# Patient Record
Sex: FEMALE | Race: WHITE | NOT HISPANIC OR LATINO | Employment: FULL TIME | URBAN - METROPOLITAN AREA
[De-identification: names, ages, dates, MRNs, and addresses within clinical notes are randomized per-mention and may not be internally consistent; named-entity substitution may affect disease eponyms.]

---

## 2017-02-15 ENCOUNTER — ALLSCRIPTS OFFICE VISIT (OUTPATIENT)
Dept: OTHER | Facility: OTHER | Age: 62
End: 2017-02-15

## 2017-02-15 DIAGNOSIS — Z12.31 ENCOUNTER FOR SCREENING MAMMOGRAM FOR MALIGNANT NEOPLASM OF BREAST: ICD-10-CM

## 2017-02-15 DIAGNOSIS — Z12.11 ENCOUNTER FOR SCREENING FOR MALIGNANT NEOPLASM OF COLON: ICD-10-CM

## 2017-04-13 ENCOUNTER — APPOINTMENT (OUTPATIENT)
Dept: LAB | Facility: CLINIC | Age: 62
End: 2017-04-13
Payer: COMMERCIAL

## 2017-04-13 ENCOUNTER — ALLSCRIPTS OFFICE VISIT (OUTPATIENT)
Dept: OTHER | Facility: OTHER | Age: 62
End: 2017-04-13

## 2017-04-13 DIAGNOSIS — E55.9 VITAMIN D DEFICIENCY: ICD-10-CM

## 2017-04-13 DIAGNOSIS — E03.9 HYPOTHYROIDISM: ICD-10-CM

## 2017-04-13 DIAGNOSIS — I10 ESSENTIAL (PRIMARY) HYPERTENSION: ICD-10-CM

## 2017-04-13 DIAGNOSIS — R53.83 OTHER FATIGUE: ICD-10-CM

## 2017-04-13 DIAGNOSIS — Z12.11 ENCOUNTER FOR SCREENING FOR MALIGNANT NEOPLASM OF COLON: ICD-10-CM

## 2017-04-13 DIAGNOSIS — Z12.31 ENCOUNTER FOR SCREENING MAMMOGRAM FOR MALIGNANT NEOPLASM OF BREAST: ICD-10-CM

## 2017-04-13 LAB
25(OH)D3 SERPL-MCNC: 10.8 NG/ML (ref 30–100)
ALBUMIN SERPL BCP-MCNC: 3.4 G/DL (ref 3.5–5)
ALP SERPL-CCNC: 108 U/L (ref 46–116)
ALT SERPL W P-5'-P-CCNC: 23 U/L (ref 12–78)
ANION GAP SERPL CALCULATED.3IONS-SCNC: 4 MMOL/L (ref 4–13)
AST SERPL W P-5'-P-CCNC: 12 U/L (ref 5–45)
BILIRUB SERPL-MCNC: 0.32 MG/DL (ref 0.2–1)
BUN SERPL-MCNC: 15 MG/DL (ref 5–25)
CALCIUM SERPL-MCNC: 8.8 MG/DL (ref 8.3–10.1)
CHLORIDE SERPL-SCNC: 110 MMOL/L (ref 100–108)
CHOLEST SERPL-MCNC: 180 MG/DL (ref 50–200)
CO2 SERPL-SCNC: 29 MMOL/L (ref 21–32)
CREAT SERPL-MCNC: 0.85 MG/DL (ref 0.6–1.3)
ERYTHROCYTE [DISTWIDTH] IN BLOOD BY AUTOMATED COUNT: 13.7 % (ref 11.6–15.1)
GFR SERPL CREATININE-BSD FRML MDRD: >60 ML/MIN/1.73SQ M
GLUCOSE P FAST SERPL-MCNC: 98 MG/DL (ref 65–99)
HCT VFR BLD AUTO: 40.9 % (ref 34.8–46.1)
HDLC SERPL-MCNC: 53 MG/DL (ref 40–60)
HGB BLD-MCNC: 13.4 G/DL (ref 11.5–15.4)
LDLC SERPL CALC-MCNC: 109 MG/DL (ref 0–100)
MCH RBC QN AUTO: 30.2 PG (ref 26.8–34.3)
MCHC RBC AUTO-ENTMCNC: 32.8 G/DL (ref 31.4–37.4)
MCV RBC AUTO: 92 FL (ref 82–98)
PLATELET # BLD AUTO: 183 THOUSANDS/UL (ref 149–390)
PMV BLD AUTO: 11.7 FL (ref 8.9–12.7)
POTASSIUM SERPL-SCNC: 3.5 MMOL/L (ref 3.5–5.3)
PROT SERPL-MCNC: 6.9 G/DL (ref 6.4–8.2)
RBC # BLD AUTO: 4.43 MILLION/UL (ref 3.81–5.12)
SODIUM SERPL-SCNC: 143 MMOL/L (ref 136–145)
TRIGL SERPL-MCNC: 91 MG/DL
TSH SERPL DL<=0.05 MIU/L-ACNC: 0.81 UIU/ML (ref 0.36–3.74)
WBC # BLD AUTO: 6.1 THOUSAND/UL (ref 4.31–10.16)

## 2017-04-13 PROCEDURE — 80061 LIPID PANEL: CPT

## 2017-04-13 PROCEDURE — 82306 VITAMIN D 25 HYDROXY: CPT

## 2017-04-13 PROCEDURE — 36415 COLL VENOUS BLD VENIPUNCTURE: CPT

## 2017-04-13 PROCEDURE — 84443 ASSAY THYROID STIM HORMONE: CPT

## 2017-04-13 PROCEDURE — 80053 COMPREHEN METABOLIC PANEL: CPT

## 2017-04-13 PROCEDURE — 85027 COMPLETE CBC AUTOMATED: CPT

## 2017-06-08 DIAGNOSIS — E55.9 VITAMIN D DEFICIENCY: ICD-10-CM

## 2017-07-03 ENCOUNTER — ALLSCRIPTS OFFICE VISIT (OUTPATIENT)
Dept: OTHER | Facility: OTHER | Age: 62
End: 2017-07-03

## 2017-07-06 ENCOUNTER — TRANSCRIBE ORDERS (OUTPATIENT)
Dept: LAB | Facility: CLINIC | Age: 62
End: 2017-07-06

## 2017-07-06 ENCOUNTER — APPOINTMENT (OUTPATIENT)
Dept: LAB | Facility: CLINIC | Age: 62
End: 2017-07-06
Payer: COMMERCIAL

## 2017-07-06 DIAGNOSIS — E55.9 VITAMIN D DEFICIENCY: ICD-10-CM

## 2017-07-06 LAB — 25(OH)D3 SERPL-MCNC: 24.2 NG/ML (ref 30–100)

## 2017-07-06 PROCEDURE — 36415 COLL VENOUS BLD VENIPUNCTURE: CPT

## 2017-07-06 PROCEDURE — 82306 VITAMIN D 25 HYDROXY: CPT

## 2017-07-07 ENCOUNTER — GENERIC CONVERSION - ENCOUNTER (OUTPATIENT)
Dept: OTHER | Facility: OTHER | Age: 62
End: 2017-07-07

## 2017-07-27 ENCOUNTER — GENERIC CONVERSION - ENCOUNTER (OUTPATIENT)
Dept: OTHER | Facility: OTHER | Age: 62
End: 2017-07-27

## 2017-07-27 ENCOUNTER — APPOINTMENT (OUTPATIENT)
Dept: LAB | Facility: CLINIC | Age: 62
End: 2017-07-27
Payer: COMMERCIAL

## 2017-07-27 DIAGNOSIS — Z12.11 ENCOUNTER FOR SCREENING FOR MALIGNANT NEOPLASM OF COLON: ICD-10-CM

## 2017-07-27 LAB — HEMOCCULT STL QL IA: NEGATIVE

## 2017-07-27 PROCEDURE — G0328 FECAL BLOOD SCRN IMMUNOASSAY: HCPCS

## 2017-08-21 DIAGNOSIS — E87.6 HYPOKALEMIA: ICD-10-CM

## 2017-08-21 DIAGNOSIS — Z12.31 ENCOUNTER FOR SCREENING MAMMOGRAM FOR MALIGNANT NEOPLASM OF BREAST: ICD-10-CM

## 2017-08-21 DIAGNOSIS — E55.9 VITAMIN D DEFICIENCY: ICD-10-CM

## 2017-08-24 ENCOUNTER — APPOINTMENT (OUTPATIENT)
Dept: LAB | Facility: CLINIC | Age: 62
End: 2017-08-24
Payer: COMMERCIAL

## 2017-08-24 ENCOUNTER — TRANSCRIBE ORDERS (OUTPATIENT)
Dept: LAB | Facility: CLINIC | Age: 62
End: 2017-08-24

## 2017-08-24 DIAGNOSIS — E55.9 VITAMIN D DEFICIENCY: ICD-10-CM

## 2017-08-24 DIAGNOSIS — E87.6 HYPOKALEMIA: ICD-10-CM

## 2017-08-24 LAB
25(OH)D3 SERPL-MCNC: 40 NG/ML (ref 30–100)
POTASSIUM SERPL-SCNC: 4 MMOL/L (ref 3.5–5.3)

## 2017-08-24 PROCEDURE — 82306 VITAMIN D 25 HYDROXY: CPT

## 2017-08-24 PROCEDURE — 84132 ASSAY OF SERUM POTASSIUM: CPT

## 2017-08-24 PROCEDURE — 36415 COLL VENOUS BLD VENIPUNCTURE: CPT

## 2017-08-25 ENCOUNTER — GENERIC CONVERSION - ENCOUNTER (OUTPATIENT)
Dept: OTHER | Facility: OTHER | Age: 62
End: 2017-08-25

## 2017-08-28 ENCOUNTER — ALLSCRIPTS OFFICE VISIT (OUTPATIENT)
Dept: OTHER | Facility: OTHER | Age: 62
End: 2017-08-28

## 2017-10-23 ENCOUNTER — ALLSCRIPTS OFFICE VISIT (OUTPATIENT)
Dept: OTHER | Facility: OTHER | Age: 62
End: 2017-10-23

## 2017-10-23 DIAGNOSIS — R10.11 RIGHT UPPER QUADRANT PAIN: ICD-10-CM

## 2017-10-24 ENCOUNTER — HOSPITAL ENCOUNTER (OUTPATIENT)
Dept: RADIOLOGY | Age: 62
Discharge: HOME | End: 2017-10-24
Payer: COMMERCIAL

## 2017-10-24 DIAGNOSIS — R10.11 RIGHT UPPER QUADRANT PAIN: ICD-10-CM

## 2017-10-24 PROCEDURE — 76700 US EXAM ABDOM COMPLETE: CPT

## 2017-10-24 NOTE — PROGRESS NOTES
Assessment  1  Chest pain (786 50) (R07 9)   2  Right upper quadrant abdominal pain of unknown etiology (789 01) (R10 11)    Plan  Allergic rhinitis    · Fluticasone Propionate 50 MCG/ACT Nasal Suspension; use 2 sprays in each  nostril once daily  Chest pain    · EKG/ECG- POC; Status:Complete;   Done: 46QLI8793 03:23PM  Right upper quadrant abdominal pain of unknown etiology    · * US ABDOMEN COMPLETE; Status:Hold For - Scheduling; Requested BUN:[id number];     Discussion/Summary    Will await results of the ultrasound and treat accordingly pending results  Possible side effects of new medications were reviewed with the patient/guardian today  The treatment plan was reviewed with the patient/guardian  The patient/guardian understands and agrees with the treatment plan      Chief Complaint  1  Abdominal Pain    History of Present Illness  HPI: pAIN ALONG DIAPHRAGM AREA THAT WRAPS AROUND BILATERALLY AROUND TO BACK  NO COUGH, NO FEVER, + HEADACHE  She complains of increased belching but no bloating no increased heartburn symptoms no change in bowels or bladder and no injury to her back  She denies any chest pain jaw pain or arm pain or dyspnea with exertion  Review of Systems    Constitutional: No fever, no chills, feels well, no tiredness, no recent weight gain or loss  ENT: no ear ache, no loss of hearing, no nosebleeds or nasal discharge, no sore throat or hoarseness  Cardiovascular: no complaints of slow or fast heart rate, no chest pain, no palpitations, no leg claudication or lower extremity edema  Respiratory: no complaints of shortness of breath, no wheezing, no dyspnea on exertion, no orthopnea or PND  Breasts: no complaints of breast pain, breast lump or nipple discharge     Gastrointestinal: abdominal pain-- and-- Pain in the abdominal area high up radiating around both sides of the ribs anteriorly, but-- no complaints of abdominal pain, no constipation, no nausea or diarrhea, no vomiting, no bloody stools  Genitourinary: no complaints of dysuria, no incontinence, no pelvic pain, no dysmenorrhea, no vaginal discharge or abnormal vaginal bleeding  Musculoskeletal: no complaints of arthralgia, no myalgia, no joint swelling or stiffness, no limb pain or swelling  Integumentary: no complaints of skin rash or lesion, no itching or dry skin, no skin wounds  Neurological: no complaints of headache, no confusion, no numbness or tingling, no dizziness or fainting  Active Problems  1  Acute bronchitis (466 0) (J20 9)   2  Acute otitis media (382 9) (H66 90)   3  Acute sinusitis (461 9) (J01 90)   4  Acute upper respiratory infection (465 9) (J06 9)   5  Anxiety (300 00) (F41 9)   6  Bite, insect (919 4,E906 4) (W57 XXXA)   7  Chronic allergic conjunctivitis (372 14) (H10 45)   8  Colon cancer screening (V76 51) (Z12 11)   9  Cough (786 2) (R05)   10  Encounter for screening mammogram for breast cancer (V76 12) (Z12 31)   11  Encounter for vaccination (V05 9) (Z23)   12  Fatigue (780 79) (R53 83)   13  Hypertension (401 9) (I10)   14  Denied: History of Hyperthyroidism   15  Hypokalemia (276 8) (E87 6)   16  Hypothyroidism (244 9) (E03 9)   17  Insomnia (780 52) (G47 00)   18  Obesity (278 00) (E66 9)   19  Swelling (782 3) (R60 9)   20  Vitamin D deficiency (268 9) (E55 9)    Past Medical History  1  Denied: History of Hyperthyroidism  Active Problems And Past Medical History Reviewed: The active problems and past medical history were reviewed and updated today  Family History  Father    1  Family history of Diabetes Mellitus (V18 0)   2  Family history of Heart Disease (V17 49)   3  Family history of Hypertension (V17 49)  Family History Reviewed: The family history was reviewed and updated today         Social History   · Denied: History of Alcohol Use (History)   · Denied: History of Daily Coffee Consumption (___ Cups/Day)   · Daily Cola Consumption (___ Cans/Day)   · Marital History - Currently    · Never A Smoker  The social history was reviewed and updated today  The social history was reviewed and is unchanged  Surgical History  Surgical History Reviewed: The surgical history was reviewed and updated today  Current Meds   1  AmLODIPine Besylate 5 MG Oral Tablet; TAKE 1 5 TABLET Daily; Therapy: 95PVQ0327 to (Deborah Holly)  Requested for: 41HOX5638; Last   Rx:29Eyd4676 Ordered   2  Fluticasone Propionate 50 MCG/ACT Nasal Suspension; use 2 sprays in each nostril   once daily; Therapy: 01Ywx5315 to (Andre Valentino)  Requested for: 82Uql2625; Last   Rx:08Hdz7225 Ordered   3  Levothyroxine Sodium 100 MCG Oral Tablet; TAKE 1 TABLET DAILY; Therapy: 34SNY2267 to ([phone])  Requested for: 07Ccz8765; Last   Rx:54Vzx1381 Ordered   4  Losartan Potassium 100 MG Oral Tablet; take 1 tablet every day; Therapy: 59OEI1258 to (Evaluate:74Ajz5391)  Requested for: 73NVE3053; Last   Rx:48Oal7681 Ordered   5  Potassium Chloride ER 10 MEQ Oral Capsule Extended Release; TAKE 2 CAPSULES   EVERY DAY; Therapy: 02PLS9176 to (Evaluate:95Xvh6983)  Requested for: 55Wtt7919; Last   Rx:07Jse1676 Ordered   6  Spironolactone 25 MG Oral Tablet; TAKE 1 TABLET AS NEEDED FOR SWELLING; Therapy: 02IHD1659 to ([phone])  Requested for: 86Mjl6248; Last   Rx:06Tma5628 Ordered   7  Synthroid 100 MCG Oral Tablet; TAKE 1 TABLET BY MOUTH EVERY DAY AS DIRECTED    Requested for: 62Rxo8782; Last Rx:98Vhy0026 Ordered   8  Vitamin C 500 MG Oral Capsule; TAKE 2 CAPSULE Twice daily Recorded   9  Vitamin D3 26478 UNIT Oral Capsule; 50,000 UNITS Q WEEK X 4 WEEKS; Therapy: 70RST1781 to (Last Rx:40Pvg8777)  Requested for: 36CQK1842 Ordered   10  ZyrTEC Allergy 10 MG Oral Tablet; take 1 tablet daily as needed Recorded    The medication list was reviewed and updated today  Allergies  1  Lexapro TABS   2   Morphine Sulfate ER TB12    Vitals   Recorded: 96RRQ8343 03:02PM   Temperature 98 F Heart Rate 68   Systolic 104   Diastolic 80   Height 5 ft 6 in   Weight 163 lb    BMI Calculated 26 31   BSA Calculated 1 83     Physical Exam    Constitutional   General appearance: No acute distress, well appearing and well nourished  Eyes   Conjunctiva and lids: No swelling, erythema or discharge  Pupils and irises: Equal, round and reactive to light  Ears, Nose, Mouth, and Throat   External inspection of ears and nose: Normal     Otoscopic examination: Tympanic membranes translucent with normal light reflex  Canals patent without erythema  Nasal mucosa, septum, and turbinates: Normal without edema or erythema  Oropharynx: Normal with no erythema, edema, exudate or lesions  Pulmonary   Respiratory effort: No increased work of breathing or signs of respiratory distress  Auscultation of lungs: Clear to auscultation  Cardiovascular   Auscultation of heart: Normal rate and rhythm, normal S1 and S2, without murmurs  Examination of extremities for edema and/or varicosities: Normal     Carotid pulses: Normal     Abdomen   Abdomen: Abnormal  -- no tenderness on palpation, no rigidity rebound are doing  Liver and spleen: No hepatomegaly or splenomegaly  Lymphatic   Palpation of lymph nodes in neck: No lymphadenopathy  Musculoskeletal   Digits and nails: Normal without clubbing or cyanosis  Skin   Skin and subcutaneous tissue: Normal without rashes or lesions  Neurologic   Reflexes: 2+ and symmetric      Psychiatric   Orientation to person, place, and time: Normal     Mood and affect: Normal          Results/Data  EKG/ECG- POC 55GQN4790 03:23PM Robert Dang     Test Name Result Flag Reference   EKG/ECG      Normal sinus rhythm without acute changes       Future Appointments    Date/Time Provider Specialty Site   03/30/2018 08:00 AM Robert Dang DO Family Medicine [facility] [place] [facility]     Signatures   Electronically signed by : Mil Griggs DO; Oct 23 2017  3:40PM EST                       (Author)

## 2017-10-27 ENCOUNTER — GENERIC CONVERSION - ENCOUNTER (OUTPATIENT)
Dept: OTHER | Facility: OTHER | Age: 62
End: 2017-10-27

## 2017-10-31 ENCOUNTER — GENERIC CONVERSION - ENCOUNTER (OUTPATIENT)
Dept: OTHER | Facility: OTHER | Age: 62
End: 2017-10-31

## 2017-11-04 ENCOUNTER — TRANSCRIBE ORDERS (OUTPATIENT)
Dept: LAB | Facility: CLINIC | Age: 62
End: 2017-11-04

## 2017-11-04 ENCOUNTER — APPOINTMENT (OUTPATIENT)
Dept: LAB | Facility: CLINIC | Age: 62
End: 2017-11-04
Payer: COMMERCIAL

## 2017-11-04 DIAGNOSIS — K80.18 CALCULUS OF GALLBLADDER WITH OTHER CHOLECYSTITIS WITHOUT OBSTRUCTION: ICD-10-CM

## 2017-11-04 DIAGNOSIS — K80.18 CALCULUS OF GALLBLADDER WITH OTHER CHOLECYSTITIS WITHOUT OBSTRUCTION: Primary | ICD-10-CM

## 2017-11-04 LAB
ALBUMIN SERPL BCP-MCNC: 3.3 G/DL (ref 3.5–5)
ALP SERPL-CCNC: 111 U/L (ref 46–116)
ALT SERPL W P-5'-P-CCNC: 22 U/L (ref 12–78)
ANION GAP SERPL CALCULATED.3IONS-SCNC: 4 MMOL/L (ref 4–13)
AST SERPL W P-5'-P-CCNC: 18 U/L (ref 5–45)
BILIRUB SERPL-MCNC: 0.3 MG/DL (ref 0.2–1)
BUN SERPL-MCNC: 13 MG/DL (ref 5–25)
CALCIUM SERPL-MCNC: 8.7 MG/DL (ref 8.3–10.1)
CHLORIDE SERPL-SCNC: 107 MMOL/L (ref 100–108)
CO2 SERPL-SCNC: 31 MMOL/L (ref 21–32)
CREAT SERPL-MCNC: 0.87 MG/DL (ref 0.6–1.3)
ERYTHROCYTE [DISTWIDTH] IN BLOOD BY AUTOMATED COUNT: 13.8 % (ref 11.6–15.1)
GFR SERPL CREATININE-BSD FRML MDRD: 72 ML/MIN/1.73SQ M
GLUCOSE SERPL-MCNC: 118 MG/DL (ref 65–140)
HCT VFR BLD AUTO: 42.1 % (ref 34.8–46.1)
HGB BLD-MCNC: 13.6 G/DL (ref 11.5–15.4)
MCH RBC QN AUTO: 30.1 PG (ref 26.8–34.3)
MCHC RBC AUTO-ENTMCNC: 32.3 G/DL (ref 31.4–37.4)
MCV RBC AUTO: 93 FL (ref 82–98)
PLATELET # BLD AUTO: 155 THOUSANDS/UL (ref 149–390)
PMV BLD AUTO: 11.7 FL (ref 8.9–12.7)
POTASSIUM SERPL-SCNC: 3.7 MMOL/L (ref 3.5–5.3)
PROT SERPL-MCNC: 7 G/DL (ref 6.4–8.2)
RBC # BLD AUTO: 4.52 MILLION/UL (ref 3.81–5.12)
SODIUM SERPL-SCNC: 142 MMOL/L (ref 136–145)
WBC # BLD AUTO: 4.94 THOUSAND/UL (ref 4.31–10.16)

## 2017-11-04 PROCEDURE — 85027 COMPLETE CBC AUTOMATED: CPT

## 2017-11-04 PROCEDURE — 36415 COLL VENOUS BLD VENIPUNCTURE: CPT

## 2017-11-04 PROCEDURE — 80053 COMPREHEN METABOLIC PANEL: CPT

## 2017-11-10 RX ORDER — FLUTICASONE PROPIONATE 50 MCG
SPRAY, SUSPENSION (ML) NASAL
COMMUNITY
Start: 2016-09-30 | End: 2018-11-20 | Stop reason: SDUPTHER

## 2017-11-10 RX ORDER — MULTIVIT-MIN/IRON/FOLIC ACID/K 18-600-40
CAPSULE ORAL
COMMUNITY

## 2017-11-10 RX ORDER — AMLODIPINE BESYLATE 5 MG/1
5 TABLET ORAL DAILY
COMMUNITY
Start: 2017-04-05 | End: 2018-03-15 | Stop reason: SDUPTHER

## 2017-11-10 RX ORDER — LOSARTAN POTASSIUM 100 MG/1
TABLET ORAL
COMMUNITY
End: 2018-02-20 | Stop reason: SDUPTHER

## 2017-11-10 RX ORDER — ERGOCALCIFEROL 1.25 MG/1
CAPSULE ORAL
COMMUNITY
Start: 2017-07-10 | End: 2017-11-10 | Stop reason: ALTCHOICE

## 2017-11-10 RX ORDER — SPIRONOLACTONE 25 MG/1
TABLET ORAL AS NEEDED
COMMUNITY
Start: 2017-09-11 | End: 2019-01-21 | Stop reason: ALTCHOICE

## 2017-11-10 RX ORDER — POTASSIUM CHLORIDE 750 MG/1
20 CAPSULE, EXTENDED RELEASE ORAL DAILY
COMMUNITY
Start: 2016-08-24 | End: 2018-03-15 | Stop reason: SDUPTHER

## 2017-11-10 RX ORDER — LEVOTHYROXINE SODIUM 0.1 MG/1
TABLET ORAL
COMMUNITY
Start: 2016-09-02 | End: 2018-02-07 | Stop reason: SDUPTHER

## 2017-11-10 NOTE — PRE-PROCEDURE INSTRUCTIONS
Pre-Surgery Instructions:   Medication Instructions    amLODIPine (NORVASC) 5 mg tablet Instructed patient per Anesthesia Guidelines   Ascorbic Acid (VITAMIN C) 500 MG CAPS Instructed patient per Anesthesia Guidelines   Cetirizine HCl (ZYRTEC ALLERGY) 10 MG CAPS Instructed patient per Anesthesia Guidelines   Cholecalciferol (VITAMIN D PO) Instructed patient per Anesthesia Guidelines   fluticasone (FLONASE) 50 mcg/act nasal spray Instructed patient per Anesthesia Guidelines   levothyroxine 100 mcg tablet Instructed patient per Anesthesia Guidelines   losartan (COZAAR) 100 MG tablet Instructed patient per Anesthesia Guidelines   potassium chloride (MICRO-K) 10 MEQ CR capsule Instructed patient per Anesthesia Guidelines      spironolactone (ALDACTONE) 25 mg tablet Patient was instructed per "E-Preop"

## 2017-11-14 ENCOUNTER — ANESTHESIA EVENT (OUTPATIENT)
Dept: PERIOP | Facility: HOSPITAL | Age: 62
End: 2017-11-14
Payer: COMMERCIAL

## 2017-11-14 NOTE — ANESTHESIA PREPROCEDURE EVALUATION
Review of Systems/Medical History  Patient summary reviewed  Chart reviewed  No history of anesthetic complications     Cardiovascular  Hypertension ,    Pulmonary       GI/Hepatic            Endo/Other  History of thyroid disease , hypothyroidism,      GYN       Hematology   Musculoskeletal       Neurology   Psychology           Physical Exam    Airway    Mallampati score: II  TM Distance: >3 FB  Neck ROM: full     Dental   No notable dental hx     Cardiovascular  Cardiovascular exam normal    Pulmonary  Pulmonary exam normal     Other Findings        Anesthesia Plan  ASA Score- 3       Anesthesia Type- general with ASA Monitors  Additional Monitors:   Airway Plan: ETT  Induction- intravenous  Informed Consent- Anesthetic plan and risks discussed with patient  I personally reviewed this patient with the CRNA  Discussed and agreed on the Anesthesia Plan with the CRNA  Dani Saez

## 2017-11-15 ENCOUNTER — ANESTHESIA (OUTPATIENT)
Dept: PERIOP | Facility: HOSPITAL | Age: 62
End: 2017-11-15
Payer: COMMERCIAL

## 2017-11-15 ENCOUNTER — HOSPITAL ENCOUNTER (OUTPATIENT)
Facility: HOSPITAL | Age: 62
Setting detail: OUTPATIENT SURGERY
Discharge: HOME | End: 2017-11-15
Attending: SURGERY | Admitting: SURGERY
Payer: COMMERCIAL

## 2017-11-15 VITALS
BODY MASS INDEX: 30.21 KG/M2 | RESPIRATION RATE: 16 BRPM | HEIGHT: 61 IN | WEIGHT: 160 LBS | DIASTOLIC BLOOD PRESSURE: 59 MMHG | TEMPERATURE: 98.5 F | SYSTOLIC BLOOD PRESSURE: 111 MMHG | OXYGEN SATURATION: 93 % | HEART RATE: 67 BPM

## 2017-11-15 DIAGNOSIS — K80.20 CALCULUS OF GALLBLADDER WITHOUT CHOLECYSTITIS WITHOUT OBSTRUCTION: ICD-10-CM

## 2017-11-15 PROCEDURE — 88304 TISSUE EXAM BY PATHOLOGIST: CPT | Performed by: SURGERY

## 2017-11-15 RX ORDER — KETOROLAC TROMETHAMINE 30 MG/ML
INJECTION, SOLUTION INTRAMUSCULAR; INTRAVENOUS AS NEEDED
Status: DISCONTINUED | OUTPATIENT
Start: 2017-11-15 | End: 2017-11-15 | Stop reason: SURG

## 2017-11-15 RX ORDER — OXYCODONE HYDROCHLORIDE 5 MG/1
5 TABLET ORAL EVERY 4 HOURS PRN
Status: DISCONTINUED | OUTPATIENT
Start: 2017-11-15 | End: 2017-11-15 | Stop reason: HOSPADM

## 2017-11-15 RX ORDER — ONDANSETRON 2 MG/ML
INJECTION INTRAMUSCULAR; INTRAVENOUS AS NEEDED
Status: DISCONTINUED | OUTPATIENT
Start: 2017-11-15 | End: 2017-11-15 | Stop reason: SURG

## 2017-11-15 RX ORDER — SODIUM CHLORIDE, SODIUM LACTATE, POTASSIUM CHLORIDE, CALCIUM CHLORIDE 600; 310; 30; 20 MG/100ML; MG/100ML; MG/100ML; MG/100ML
20 INJECTION, SOLUTION INTRAVENOUS CONTINUOUS
Status: DISCONTINUED | OUTPATIENT
Start: 2017-11-15 | End: 2017-11-15 | Stop reason: HOSPADM

## 2017-11-15 RX ORDER — EPHEDRINE SULFATE 50 MG/ML
INJECTION, SOLUTION INTRAVENOUS AS NEEDED
Status: DISCONTINUED | OUTPATIENT
Start: 2017-11-15 | End: 2017-11-15 | Stop reason: SURG

## 2017-11-15 RX ORDER — LIDOCAINE HYDROCHLORIDE 10 MG/ML
INJECTION, SOLUTION INFILTRATION; PERINEURAL AS NEEDED
Status: DISCONTINUED | OUTPATIENT
Start: 2017-11-15 | End: 2017-11-15 | Stop reason: SURG

## 2017-11-15 RX ORDER — METOCLOPRAMIDE HYDROCHLORIDE 5 MG/ML
INJECTION INTRAMUSCULAR; INTRAVENOUS AS NEEDED
Status: DISCONTINUED | OUTPATIENT
Start: 2017-11-15 | End: 2017-11-15 | Stop reason: SURG

## 2017-11-15 RX ORDER — OXYCODONE HYDROCHLORIDE 10 MG/1
10 TABLET ORAL EVERY 4 HOURS PRN
Status: DISCONTINUED | OUTPATIENT
Start: 2017-11-15 | End: 2017-11-15 | Stop reason: HOSPADM

## 2017-11-15 RX ORDER — BUPIVACAINE HYDROCHLORIDE AND EPINEPHRINE 2.5; 5 MG/ML; UG/ML
INJECTION, SOLUTION EPIDURAL; INFILTRATION; INTRACAUDAL; PERINEURAL AS NEEDED
Status: DISCONTINUED | OUTPATIENT
Start: 2017-11-15 | End: 2017-11-15 | Stop reason: HOSPADM

## 2017-11-15 RX ORDER — FENTANYL CITRATE 50 UG/ML
INJECTION, SOLUTION INTRAMUSCULAR; INTRAVENOUS AS NEEDED
Status: DISCONTINUED | OUTPATIENT
Start: 2017-11-15 | End: 2017-11-15 | Stop reason: SURG

## 2017-11-15 RX ORDER — MAGNESIUM HYDROXIDE 1200 MG/15ML
LIQUID ORAL AS NEEDED
Status: DISCONTINUED | OUTPATIENT
Start: 2017-11-15 | End: 2017-11-15 | Stop reason: HOSPADM

## 2017-11-15 RX ORDER — SODIUM CHLORIDE, SODIUM LACTATE, POTASSIUM CHLORIDE, CALCIUM CHLORIDE 600; 310; 30; 20 MG/100ML; MG/100ML; MG/100ML; MG/100ML
75 INJECTION, SOLUTION INTRAVENOUS CONTINUOUS
Status: DISCONTINUED | OUTPATIENT
Start: 2017-11-15 | End: 2017-11-15 | Stop reason: HOSPADM

## 2017-11-15 RX ORDER — PROPOFOL 10 MG/ML
INJECTION, EMULSION INTRAVENOUS AS NEEDED
Status: DISCONTINUED | OUTPATIENT
Start: 2017-11-15 | End: 2017-11-15 | Stop reason: SURG

## 2017-11-15 RX ORDER — ONDANSETRON 2 MG/ML
4 INJECTION INTRAMUSCULAR; INTRAVENOUS ONCE AS NEEDED
Status: DISCONTINUED | OUTPATIENT
Start: 2017-11-15 | End: 2017-11-15 | Stop reason: HOSPADM

## 2017-11-15 RX ORDER — ACETAMINOPHEN 325 MG/1
650 TABLET ORAL EVERY 6 HOURS PRN
Status: DISCONTINUED | OUTPATIENT
Start: 2017-11-15 | End: 2017-11-15 | Stop reason: HOSPADM

## 2017-11-15 RX ORDER — ROCURONIUM BROMIDE 10 MG/ML
INJECTION, SOLUTION INTRAVENOUS AS NEEDED
Status: DISCONTINUED | OUTPATIENT
Start: 2017-11-15 | End: 2017-11-15 | Stop reason: SURG

## 2017-11-15 RX ORDER — GLYCOPYRROLATE 0.2 MG/ML
INJECTION INTRAMUSCULAR; INTRAVENOUS AS NEEDED
Status: DISCONTINUED | OUTPATIENT
Start: 2017-11-15 | End: 2017-11-15 | Stop reason: SURG

## 2017-11-15 RX ORDER — PROPOFOL 10 MG/ML
INJECTION, EMULSION INTRAVENOUS CONTINUOUS PRN
Status: DISCONTINUED | OUTPATIENT
Start: 2017-11-15 | End: 2017-11-15 | Stop reason: SURG

## 2017-11-15 RX ORDER — ONDANSETRON 2 MG/ML
4 INJECTION INTRAMUSCULAR; INTRAVENOUS EVERY 6 HOURS PRN
Status: DISCONTINUED | OUTPATIENT
Start: 2017-11-15 | End: 2017-11-15 | Stop reason: HOSPADM

## 2017-11-15 RX ORDER — FENTANYL CITRATE/PF 50 MCG/ML
25 SYRINGE (ML) INJECTION
Status: DISCONTINUED | OUTPATIENT
Start: 2017-11-15 | End: 2017-11-15 | Stop reason: HOSPADM

## 2017-11-15 RX ADMIN — GLYCOPYRROLATE 0.4 MG: 0.2 INJECTION, SOLUTION INTRAMUSCULAR; INTRAVENOUS at 11:15

## 2017-11-15 RX ADMIN — LIDOCAINE HYDROCHLORIDE 50 MG: 10 INJECTION, SOLUTION INFILTRATION; PERINEURAL at 10:21

## 2017-11-15 RX ADMIN — SODIUM CHLORIDE, SODIUM LACTATE, POTASSIUM CHLORIDE, AND CALCIUM CHLORIDE: .6; .31; .03; .02 INJECTION, SOLUTION INTRAVENOUS at 11:10

## 2017-11-15 RX ADMIN — CEFAZOLIN SODIUM 1000 MG: 1 SOLUTION INTRAVENOUS at 10:26

## 2017-11-15 RX ADMIN — FENTANYL CITRATE 25 MCG: 50 INJECTION, SOLUTION INTRAMUSCULAR; INTRAVENOUS at 11:48

## 2017-11-15 RX ADMIN — KETOROLAC TROMETHAMINE 30 MG: 30 INJECTION, SOLUTION INTRAMUSCULAR at 11:10

## 2017-11-15 RX ADMIN — METOCLOPRAMIDE 10 MG: 5 INJECTION, SOLUTION INTRAMUSCULAR; INTRAVENOUS at 10:26

## 2017-11-15 RX ADMIN — SODIUM CHLORIDE, SODIUM LACTATE, POTASSIUM CHLORIDE, AND CALCIUM CHLORIDE 20 ML/HR: .6; .31; .03; .02 INJECTION, SOLUTION INTRAVENOUS at 09:51

## 2017-11-15 RX ADMIN — PROPOFOL 80 MCG/KG/MIN: 10 INJECTION, EMULSION INTRAVENOUS at 10:31

## 2017-11-15 RX ADMIN — ONDANSETRON 4 MG: 2 INJECTION INTRAMUSCULAR; INTRAVENOUS at 10:40

## 2017-11-15 RX ADMIN — EPHEDRINE SULFATE 5 MG: 50 INJECTION, SOLUTION INTRAMUSCULAR; INTRAVENOUS; SUBCUTANEOUS at 11:02

## 2017-11-15 RX ADMIN — NEOSTIGMINE METHYLSULFATE 3 MG: 1 INJECTION, SOLUTION INTRAMUSCULAR; INTRAVENOUS; SUBCUTANEOUS at 11:15

## 2017-11-15 RX ADMIN — DEXAMETHASONE SODIUM PHOSPHATE 10 MG: 10 INJECTION INTRAMUSCULAR; INTRAVENOUS at 10:40

## 2017-11-15 RX ADMIN — ROCURONIUM BROMIDE 40 MG: 10 INJECTION INTRAVENOUS at 10:21

## 2017-11-15 RX ADMIN — FENTANYL CITRATE 100 MCG: 50 INJECTION, SOLUTION INTRAMUSCULAR; INTRAVENOUS at 10:19

## 2017-11-15 RX ADMIN — PROPOFOL 200 MG: 10 INJECTION, EMULSION INTRAVENOUS at 10:21

## 2017-11-15 NOTE — OP NOTE
OPERATIVE REPORT  PATIENT NAME: Melida Cancino    :    MRN: [id number]  Pt Location: [facility] OR ROOM 07    SURGERY DATE: 11/15/2017    Surgeon(s) and Role:     Rosenda White MD - Primary     * Rashid Gordon - Assisting    Preop Diagnosis:  Calculus of gallbladder without cholecystitis without obstruction [K80 20]    Post-Op Diagnosis Codes:     * Calculus of gallbladder without cholecystitis without obstruction [K80 20] incisional hernia    Procedure-laparoscopic cholecystectomy  Repair of incisional hernia  Specimen(s):  ID Type Source Tests Collected by Time Destination   1 : gallbladder Tissue Gallbladder TISSUE EXAM Daniel Carrillo MD 11/15/2017 1108        Estimated Blood Loss:   Minimal    Drains:       Anesthesia Type:   General    Operative Indications:  Calculus of gallbladder without cholecystitis without obstruction [K80 20]      Operative Findings:  Independent, nonsmoker, ASA 2, wound class 2, BMI 31, weight 160, height 60    Cardiovascular  Hypertension ,  Pulmonary   GI/Hepatic    Endo/Other  History of thyroid disease , hypothyroidism,  GYN   Hematology Musculoskeletal   Neurology Psychology         Cardiovascular  Hypertension ,  Pulmonary   GI/Hepatic    Endo/Other  History of thyroid disease , hypothyroidism,  GYN   Hematology Musculoskeletal   Neurology Psychology         Complications:   None    Procedure and Technique:  Patient was identified visually and via armband  Placed in the supine position  After general anesthesia the abdomen was prepped and draped in a sterile fashion  0 25% Marcaine with epinephrine was utilized throughout the procedure  An incision was made at the umbilicus  This carried down through skin subcutaneous tissue  Incisional hernia was located this region  Hernia sac was dissected free and retracted into the abdomen  A 10 mm trocar was then inserted  CO2 insufflation back pressure 15 was utilized    Three additional trocars were placed in the upper aspect of the abdomen under direct vision  The gallbladder was distended  It was thick walled  Decompression revealed white bile consistent with hydrops gallbladder  A dome down technique using electrocautery was utilized  This carried down level of the cystic duct  The critical view was obtained  The cystic duct and artery dissected free  The anterior-posterior view was visualized  This was then clipped x3 and divided  The cystic artery was clipped x3 and divided  The gallbladder was then removed from liver bed  Is placed in Endo-Catch bag and removed through the incisional hernia defect  Fascia was then closed with 0 Vicryl suture followed by 4 Monocryl suture and Dermabond  The patient tolerated the procedure well    Sponge and instrument count correct   I was present for the entire procedure    Patient Disposition:  PACU     SIGNATURE: Cassidy Sanchez MD  DATE: November 15, 2017  TIME: 11:59 AM

## 2017-11-15 NOTE — DISCHARGE INSTRUCTIONS
Postoperative Instructions    Activity:    Do not lift more than 10 pounds (a gallon of milk) for 1-2 weeks post-operatively    Walking is encouraged  Normal daily activities including climbing steps are okay  Do not engage in strenuous activity or contact sports for 4-6 weeks post-operatively    Return to work:    Return to work to be discussed at first post-operative visit    Diet:    You may return to your normal heart healthy diet    Wound Care: Your wound is closed with Histoacryl  It is okay to shower  Wash incision gently with soap and water and pat dry  Do not soak incisions in bath water or swim for two weeks  Do not apply any creams or ointments  Ice as needed    Pain Medication:    Please take as directed  No driving while taking narcotic pain medications    Other:  If you have questions after discharge please call the office    If you have increased pain, fever >101 5, increased drainage, redness or a bad smell at your surgery site, please call us immediately or come directly to the Emergency Room    Please call [phone] for an appt for 2 weeks

## 2017-11-28 ENCOUNTER — GENERIC CONVERSION - ENCOUNTER (OUTPATIENT)
Dept: OTHER | Facility: OTHER | Age: 62
End: 2017-11-28

## 2018-01-09 NOTE — RESULT NOTES
Verified Results  (1) POTASSIUM 96Txz0902 10:24AM Brenna Holguin Order Number: [id number]  TW Order Number: [id number]     Test Name Result Flag Reference   POTASSIUM 2 9 mmol/L L 3 5-5 3

## 2018-01-10 NOTE — RESULT NOTES
Verified Results  (1) OCCULT BLOOD, FECAL IMMUNOCHEMICAL TEST 69Dfw0997 09:05AM Brenna Holguin Order Number: [id number]     Test Name Result Flag Reference   OCCULT BLD, FECAL IMMUNOLOGICAL Negative  Negative   Performed by Fecal Immunochemical Test

## 2018-01-11 NOTE — RESULT NOTES
Verified Results  * 58 Niurka Crespo 08NKF9076 08:54AM Vikki Ham Order Number: [id number]    - Patient Instructions: To schedule this appointment, please contact Central Scheduling at [phone]  Test Name Result Flag Reference   US ABDOMEN COMPLETE (Report)     ABDOMEN ULTRASOUND, COMPLETE WITH DOPPLER     INDICATION: Epigastric and right upper quadrant pain     COMPARISON: None  TECHNIQUE:  Real-time ultrasound of the abdomen was performed with a curvilinear transducer with both volumetric sweeps and still imaging techniques  FINDINGS:     PANCREAS: Visualized portions of the pancreas are within normal limits  AORTA AND IVC: Visualized portions are normal for patient age  LIVER:   Size: Within normal range  The liver measures 14 7 cm in the midclavicular line  Contour: Surface contour is smooth  Parenchyma: Echogenicity and echotexture are within normal limits  No evidence of suspicious mass  Limited imaging of the main portal vein shows it to be patent and hepatopetal      BILIARY:   The gallbladder is normal in caliber  No wall thickening or pericholecystic fluid  Gallbladder neck gallstone appears nonmobile  This may be adherent to the wall  Sonographic Sadia Neal sign is negative  No intrahepatic biliary dilatation  CBD measures 5 mm  No choledocholithiasis  KIDNEY:    Right kidney measures 10 8 cm  Within normal limits  Left kidney measures 11 0 cm  Within normal limits  SPLEEN:    Measures 9 3 x 8 7 x 4 4 cm  Within normal limits  ASCITES: None  IMPRESSION:   Cholelithiasis without cholecystitis         Workstation performed: [id number]     Signed by:   Donald Brady MD   10/26/17

## 2018-01-12 VITALS
BODY MASS INDEX: 27.19 KG/M2 | HEIGHT: 61 IN | WEIGHT: 144 LBS | HEART RATE: 72 BPM | TEMPERATURE: 96.7 F | SYSTOLIC BLOOD PRESSURE: 130 MMHG | DIASTOLIC BLOOD PRESSURE: 80 MMHG

## 2018-01-12 VITALS
BODY MASS INDEX: 25.61 KG/M2 | HEART RATE: 74 BPM | DIASTOLIC BLOOD PRESSURE: 82 MMHG | TEMPERATURE: 96.5 F | WEIGHT: 159.38 LBS | SYSTOLIC BLOOD PRESSURE: 142 MMHG | RESPIRATION RATE: 16 BRPM | HEIGHT: 66 IN

## 2018-01-13 VITALS
TEMPERATURE: 97.9 F | HEIGHT: 61 IN | HEART RATE: 72 BPM | WEIGHT: 154.25 LBS | BODY MASS INDEX: 29.12 KG/M2 | DIASTOLIC BLOOD PRESSURE: 94 MMHG | SYSTOLIC BLOOD PRESSURE: 162 MMHG

## 2018-01-13 NOTE — RESULT NOTES
Verified Results  (1) TSH 86Enf8753 07:01AM Cameron Aguirre Order Number: [id number]     Test Name Result Flag Reference   TSH 0 266 uIU/mL L 0 358-3 740   Patients undergoing fluorescein dye angiography may retain small amounts of fluorescein in the body for 48-72 hours post procedure  Samples containing fluorescein can produce falsely depressed TSH values  If the patient had this procedure,a specimen should be resubmitted post fluorescein clearance            The recommended reference ranges for TSH during pregnancy are as follows:  First trimester 0 1 to 2 5 uIU/mL  Second trimester  0 2 to 3 0 uIU/mL  Third trimester 0 3 to 3 0 uIU/m

## 2018-01-14 VITALS
DIASTOLIC BLOOD PRESSURE: 80 MMHG | BODY MASS INDEX: 26.2 KG/M2 | WEIGHT: 163 LBS | SYSTOLIC BLOOD PRESSURE: 114 MMHG | HEIGHT: 66 IN | HEART RATE: 68 BPM | TEMPERATURE: 98 F

## 2018-01-15 NOTE — PROGRESS NOTES
Assessment    1  Hypothyroidism (244 9) (E03 9)   2  Fatigue (780 79) (R53 83)   3  Hypertension (401 9) (I10)   4  Vitamin D deficiency (268 9) (E55 9)   5  Colon cancer screening (V76 51) (Z12 11)   6  Encounter for vaccination (V05 9) (Z23)   7  Encounter for preventive health examination (V70 0) (Z00 00)    Plan   Acute sinusitis    · Bromfed DM 30-2-10 MG/5ML Oral Syrup  Anxiety    · ALPRAZolam 0 25 MG Oral Tablet  Colon cancer screening    · (1) OCCULT BLOOD, FECAL IMMUNOCHEMICAL TEST; Status:Active; Requested  for:14Apr2017;   Encounter for screening mammogram for breast cancer    · * MAMMO SCREENING BILATERAL W CAD; Status:Hold For - Scheduling; Requested  for:13Apr2017;   Fatigue    · (1) CBC/ PLT (NO DIFF); Status:Active; Requested for:13Apr2017;   Hypertension    · (1) COMPREHENSIVE METABOLIC PANEL; Status:Active; Requested for:13Apr2017;    · (1) LIPID PANEL FASTING W DIRECT LDL REFLEX; Status:Active; Requested  for:13Apr2017;   Hypothyroidism    · (1) TSH; Status:Active; Requested for:13Apr2017;   Vitamin D deficiency    · (1) VITAMIN D 25-HYDROXY; Status:Active; Requested for:13Apr2017; Tdap (Adacel); INJECT 0 5  ML Intramuscular; To Be Done: 13Apr2017; Status: Hold For - Administration;  Ordered  For: Encounter for vaccination; Ordered By:Linda Benedict; Effective Date:13Apr2017     Discussion/Summary  Currently, she eats an adequate diet  cervical cancer screening is current Breast cancer screening: mammogram has been ordered  Colorectal cancer screening: Fit ordered  Screening lab work includes labs ordered  Possible side effects of new medications were reviewed with the patient/guardian today  The treatment plan was reviewed with the patient/guardian  The patient/guardian understands and agrees with the treatment plan      Chief Complaint  PE      History of Present Illness  HPI: The pt presents for a well check up  She is in need of labs and c/o fatigue and difficulty losing weiight  She is due for a mammo, FIT test and booster/      Review of Systems    Constitutional: feeling tired and difficulty losing weight, but No fever, no chills, feels well, no tiredness, no recent weight gain or weight loss  Eyes: No complaints of eye pain, no red eyes, no eyesight problems, no discharge, no dry eyes, no itching of eyes  ENT: no complaints of earache, no loss of hearing, no nose bleeds, no nasal discharge, no sore throat, no hoarseness  Cardiovascular: No complaints of slow heart rate, no fast heart rate, no chest pain, no palpitations, no leg claudication, no lower extremity edema  Respiratory: No complaints of shortness of breath, no wheezing, no cough, no SOB on exertion, no orthopnea, no PND  Gastrointestinal: No complaints of abdominal pain, no constipation, no nausea or vomiting, no diarrhea, no bloody stools  Genitourinary: No complaints of dysuria, no incontinence, no pelvic pain, no dysmenorrhea, no vaginal discharge or bleeding  Musculoskeletal: No complaints of arthralgias, no myalgias, no joint swelling or stiffness, no limb pain or swelling  Integumentary: No complaints of skin rash or lesions, no itching, no skin wounds, no breast pain or lump  Neurological: No complaints of headache, no confusion, no convulsions, no numbness, no dizziness or fainting, no tingling, no limb weakness, no difficulty walking  Psychiatric: Not suicidal, no sleep disturbance, no anxiety or depression, no change in personality, no emotional problems  Endocrine: No complaints of proptosis, no hot flashes, no muscle weakness, no deepening of the voice, no feelings of weakness  Hematologic/Lymphatic: No complaints of swollen glands, no swollen glands in the neck, does not bleed easily, does not bruise easily  Active Problems    1  Acute bronchitis (466 0) (J20 9)   2  Acute otitis media (382 9) (H66 90)   3  Acute sinusitis (461 9) (J01 90)   4   Acute upper respiratory infection (465 9) (J06 9)   5  Anxiety (300 00) (F41 9)   6  Chronic allergic conjunctivitis (372 14) (H10 45)   7  Colon cancer screening (V76 51) (Z12 11)   8  Cough (786 2) (R05)   9  Encounter for screening mammogram for breast cancer (V76 12) (Z12 31)   10  Fatigue (780 79) (R53 83)   11  Hypertension (401 9) (I10)   12  Denied: History of Hyperthyroidism   13  Hypokalemia (276 8) (E87 6)   14  Hypothyroidism (244 9) (E03 9)   15  Insect bites (919 4,E906 4) (W57 XXXA)   16  Insomnia (780 52) (G47 00)   17  Obesity (278 00) (E66 9)    Past Medical History    · Denied: History of Hyperthyroidism    Family History  Father    · Family history of Diabetes Mellitus (V18 0)   · Family history of Heart Disease (V17 49)   · Family history of Hypertension (V17 49)    Social History    · Denied: History of Alcohol Use (History)   · Denied: History of Daily Coffee Consumption (___ Cups/Day)   · Daily Cola Consumption (___ Cans/Day)   · Marital History - Currently    · Never A Smoker    Current Meds   1  ALPRAZolam 0 25 MG Oral Tablet; TAKE 1/2 TO 1 TABLET BY MOUTH THREE TIMES   DAILY AS NEEDED FOR ANXIETY; Therapy: 56Qlz9637 to (Evaluate:03Oct2016); Last Rx:16Bed1799 Ordered   2  AmLODIPine Besylate 5 MG Oral Tablet; take 1 tablet by mouth every day; Therapy: 12IFS5860 to (Evaluate:62Sdz6558)  Requested for: 17DKK0443; Last   Rx:87Wut3002 Ordered   3  Bromfed DM 30-2-10 MG/5ML Oral Syrup; TAKE 10 ML Every 6 hours PRN cough and   congestion; Therapy: 44ZCZ7800 to (Evaluate:36Mpe0791)  Requested for: 51MUO3901; Last   Rx:78Aaf9136 Ordered   4  Fluticasone Propionate 50 MCG/ACT Nasal Suspension; use 2 sprays in each nostril   once daily; Therapy: 61Ulo5761 to (Evaluate:00Elo8461)  Requested for: 83WTS7383; Last   Rx:95Wbo8505 Ordered   5  Levothyroxine Sodium 100 MCG Oral Tablet; TAKE 1 TABLET DAILY; Therapy: 80FKY2697 to (Evaluate:43Ekv2365)  Requested for: 05Apr2017; Last   Rx:05Apr2017 Ordered   6   Losartan Potassium 100 MG Oral Tablet; take 1 tablet every day; Therapy: 29CSR6528 to (Evaluate:13Jun2017)  Requested for: 04MHA5916; Last   Rx:95Wpz9461 Ordered   7  Potassium Chloride ER 10 MEQ Oral Capsule Extended Release; TAKE 2 CAPSULES   EVERY DAY; Therapy: 14RDP6137 to (Evaluate:03Jsy0225)  Requested for: 97MOX6642; Last   Rx:06Apr2017 Ordered   8  Synthroid 100 MCG Oral Tablet; TAKE 1 TABLET BY MOUTH EVERY DAY AS DIRECTED    Requested for: 34Jdv1233; Last Rx:40Eks5278 Ordered   9  Vitamin C 500 MG Oral Capsule; TAKE 2 CAPSULE Twice daily Recorded   10  ZyrTEC Allergy 10 MG Oral Tablet; take 1 tablet daily as needed Recorded    Allergies    1  Lexapro TABS   2  Morphine Sulfate ER TB12    Vitals   Recorded: 13Apr2017 08:06AM   Temperature 97 4 F   Heart Rate 68   Respiration 14   Systolic 707   Diastolic 86   Height 5 ft 0 6 in   Weight 147 lb    BMI Calculated 28 14   BSA Calculated 1 65     Physical Exam    Constitutional   General appearance: No acute distress, well appearing and well nourished  Head and Face   Head and face: Normal     Palpation of the face and sinuses: No sinus tenderness  Eyes   Conjunctiva and lids: No swelling, erythema or discharge  Pupils and irises: Equal, round, reactive to light  Ophthalmoscopic examination: Normal fundi and optic discs  Ears, Nose, Mouth, and Throat   External inspection of ears and nose: Normal     Otoscopic examination: Tympanic membranes translucent with normal light reflex  Canals patent without erythema  Hearing: Normal     Nasal mucosa, septum, and turbinates: Normal without edema or erythema  Lips, teeth, and gums: Normal, good dentition  Oropharynx: Normal with no erythema, edema, exudate or lesions  Neck   Neck: Supple, symmetric, trachea midline, no masses  Thyroid: Normal, no thyromegaly  Pulmonary   Respiratory effort: No increased work of breathing or signs of respiratory distress  Auscultation of lungs: Clear to auscultation  Cardiovascular   Auscultation of heart: Normal rate and rhythm, normal S1 and S2, no murmurs  Carotid pulses: 2+ bilaterally  Pedal pulses: 2+ bilaterally  Peripheral vascular exam: Normal     Examination of extremities for edema and/or varicosities: Normal     Abdomen   Abdomen: Non-tender, no masses  Liver and spleen: No hepatomegaly or splenomegaly  Lymphatic   Palpation of lymph nodes in neck: No lymphadenopathy  Musculoskeletal   Digits and nails: Normal without clubbing or cyanosis  Skin   Skin and subcutaneous tissue: Normal without rashes or lesions  Palpation of skin and subcutaneous tissue: Normal turgor  Neurologic   Reflexes: 2+ and symmetric  Psychiatric   Judgment and insight: Normal     Orientation to person, place, and time: Normal     Recent and remote memory: Intact  Mood and affect: Normal        Future Appointments    Date/Time Provider Specialty Site   08/28/2017 08:00 AM Ana Jarquin DO Family Medicine [facility]     Signatures   Electronically signed by : Abisai Mejia DO;  Apr 13 2017  8:49AM EST                       (Author)

## 2018-01-15 NOTE — MISCELLANEOUS
Message  Message Free Text Note Form: called by lab for potassium of 2 4  called by, she will hold her hctz for the weekend  and take 40mg of potassium today and tomorrow and call the office monday      Signatures   Electronically signed by : Jayshree Nava DO; Sep 10 2016  2:20PM EST                       (Author)

## 2018-01-15 NOTE — RESULT NOTES
Verified Results  (1) POTASSIUM 15FFL2646 12:03PM Evan Maurer Order Number: [id number]   Order Number: [id number]     Test Name Result Flag Reference   POTASSIUM 3 2 mmol/L L 3 5-5 3

## 2018-01-15 NOTE — RESULT NOTES
Verified Results  (1) BASIC METABOLIC PROFILE 13YSK0152 10:02AM Skyler Avalos Order Number: [id number]     Test Name Result Flag Reference   GLUCOSE,RANDM 110 mg/dL     If the patient is fasting, the ADA then defines impaired fasting glucose as > 100 mg/dL and diabetes as > or equal to 123 mg/dL  SODIUM 144 mmol/L  136-145   POTASSIUM 2 7 mmol/L LL 3 5-5 3   Verified by repeat analysis   CHLORIDE 102 mmol/L  100-108   CARBON DIOXIDE 33 mmol/L H 21-32   ANION GAP (CALC) 9 mmol/L  4-13   BLOOD UREA NITROGEN 16 mg/dL  5-25   CREATININE 1 01 mg/dL  0 60-1 30   Standardized to IDMS reference method   CALCIUM 9 2 mg/dL  8 3-10 1   eGFR Non-African American 55 9 ml/min/1 73sq m     - Patient Instructions: This bloodwork is non-fasting  Please drink two glasses of water morning of bloodwork

## 2018-01-16 NOTE — RESULT NOTES
Verified Results  (1) VITAMIN D 25-HYDROXY 43Cim2400 11:51AM Odessa Simmonds Order Number: [id number]     Test Name Result Flag Reference   VIT D 25-HYDROX 40 0 ng/mL  30 0-100 0   This assay is a certified procedure of the CDC Vitamin D Standardization Certification Program (VDSCP)     Deficiency <20ng/ml   Insufficiency 20-30ng/ml   Sufficient  ng/ml     *Patients undergoing fluorescein dye angiography may retain small amounts of fluorescein in the body for 48-72 hours post procedure  Samples containing fluorescein can produce falsely elevated Vitamin D values  If the patient had this procedure, a specimen should be resubmitted post fluorescein clearance

## 2018-01-17 NOTE — PROGRESS NOTES
Chief Complaint  Patient was seen today for a blood pressure check she feels good no problems she just wanted to make sure her machine was running close to what we get  Today it was 120/60 and she is taking amlodipine besy 5mg and benazepril 40mg both once daily  she will continue this medication and follow up at her next visit  Also her machine comes really close she has to take 5 off top number and 10 off the bottom  Active Problems    1  Acute bronchitis (466 0) (J20 9)   2  Acute otitis media (382 9) (H66 90)   3  Acute sinusitis (461 9) (J01 90)   4  Acute upper respiratory infection (465 9) (J06 9)   5  Chronic allergic conjunctivitis (372 14) (H10 45)   6  Cough (786 2) (R05)   7  Fatigue (780 79) (R53 83)   8  Hypertension (401 9) (I10)   9  Denied: History of Hyperthyroidism   10  Hypokalemia (276 8) (E87 6)   11  Hypothyroidism (244 9) (E03 9)   12  Insect bites (919 4,E906 4) (W57 XXXA)   13  Insomnia (780 52) (G47 00)   14  Obesity (278 00) (E66 9)    Current Meds   1  AmLODIPine Besylate 5 MG Oral Tablet; Take 1 tablet daily; Therapy: 47LUJ0965 to (Evaluate:23Jun2016)  Requested for: 24Feb2016; Last   Rx:24Feb2016 Ordered   2  Benazepril HCl - 40 MG Oral Tablet; Take 1 tablet daily; Therapy: 99PXY5938 to (Evaluate:23Jun2016)  Requested for: 24Feb2016; Last   Rx:24Feb2016 Ordered   3  Fluticasone Propionate 50 MCG/ACT Nasal Suspension; use 2 sprays in each nostril   once daily; Therapy: 01Vmy5979 to (Evaluate:43Nrt7220)  Requested for: 51Gpt6596; Last   Rx:26Qne5014 Ordered   4  Hydrochlorothiazide 25 MG Oral Tablet; TAKE 1 TABLET DAILY AS DIRECTED; Therapy: 03Srx6804 to (Evaluate:23Mar2017)  Requested for: 28Mar2016; Last   Rx:28Mar2016 Ordered   5  Klor-Con M20 20 MEQ Oral Tablet Extended Release; Take 1 tablet daily; Therapy: 32RJR0260 to (Last Rx:29Mar2016)  Requested for: 40YCS7721 Ordered   6  Levothyroxine Sodium 112 MCG Oral Tablet; Take one tablet daily as directed;    Therapy: 03PAW2538 to (Deloris Lopez)  Requested for: 64Ptm1101; Last   Rx:99Kgj9480 Ordered   7  Qsymia 3 75-23 MG Oral Capsule Extended Release 24 Hour; Take 1 capsule twice   daily; Therapy: 99TKF9472 to (Evaluate:54Zmu7252); Last Rx:28Mar2016 Ordered   8  Vitamin C 500 MG Oral Capsule; TAKE 2 CAPSULE Twice daily Recorded   9  ZyrTEC Allergy 10 MG Oral Tablet; take 1 tablet daily as needed Recorded    Allergies    1  Lexapro TABS   2  Morphine Sulfate ER TB12    Vitals  Signs [Data Includes: Current Encounter]    Systolic: 499, LUE  Diastolic: 60, LUE    Assessment    1  Hypertension (401 9) (I10)    Future Appointments    Date/Time Provider Specialty Site   08/24/2016 08:15 AM Saumel Rosario, [address] [place] [address]     Signatures   Electronically signed by :  Dionicio Oscar, ; Mar 30 2016 10:39AM EST                       (Author)    Electronically signed by : Lazara Desai DO; Mar 30 2016 11:29AM EST                       (Author)

## 2018-01-18 NOTE — RESULT NOTES
Verified Results  (1) COMPREHENSIVE METABOLIC PANEL 30TXY0468 10:17VZ Tanja Gonzalez Order Number: [id number]      National Kidney Disease Education Program recommendations are as follows:  GFR calculation is accurate only with a steady state creatinine  Chronic Kidney disease less than 60 ml/min/1 73 sq  meters  Kidney failure less than 15 ml/min/1 73 sq  meters  Test Name Result Flag Reference   GLUCOSE,RANDM 123 mg/dL     If the patient is fasting, the ADA then defines impaired fasting glucose as > 100 mg/dL and diabetes as > or equal to 123 mg/dL     SODIUM 142 mmol/L  136-145   POTASSIUM 3 3 mmol/L L 3 5-5 3   CHLORIDE 103 mmol/L  100-108   CARBON DIOXIDE 35 mmol/L H 21-32   ANION GAP (CALC) 4 mmol/L  4-13   BLOOD UREA NITROGEN 14 mg/dL  5-25   CREATININE 0 82 mg/dL  0 60-1 30   Standardized to IDMS reference method   CALCIUM 8 8 mg/dL  8 3-10 1   BILI, TOTAL 0 34 mg/dL  0 20-1 00   ALK PHOSPHATAS 98 U/L     ALT (SGPT) 24 U/L  12-78   AST(SGOT) 19 U/L  5-45   ALBUMIN 3 4 g/dL L 3 5-5 0   TOTAL PROTEIN 7 0 g/dL  6 4-8 2   eGFR Non-African American      >60 0 ml/min/1 73sq m

## 2018-01-22 VITALS
BODY MASS INDEX: 27.75 KG/M2 | WEIGHT: 147 LBS | RESPIRATION RATE: 14 BRPM | TEMPERATURE: 97.4 F | DIASTOLIC BLOOD PRESSURE: 86 MMHG | SYSTOLIC BLOOD PRESSURE: 142 MMHG | HEIGHT: 61 IN | HEART RATE: 68 BPM

## 2018-02-07 ENCOUNTER — TELEPHONE (OUTPATIENT)
Dept: FAMILY MEDICINE CLINIC | Facility: CLINIC | Age: 63
End: 2018-02-07

## 2018-02-07 DIAGNOSIS — E03.9 HYPOTHYROIDISM, UNSPECIFIED TYPE: Primary | ICD-10-CM

## 2018-02-07 RX ORDER — LEVOTHYROXINE SODIUM 0.1 MG/1
100 TABLET ORAL DAILY
Qty: 90 TABLET | Refills: 2 | Status: SHIPPED | OUTPATIENT
Start: 2018-02-07 | End: 2018-06-18 | Stop reason: SDUPTHER

## 2018-02-07 NOTE — TELEPHONE ENCOUNTER
She was waiting for Synthroid 100 mcg 1 po daily, #90 sent to [facility]  Must be name brand  Was told you refused to refill  Advised to  [facility] pharm later

## 2018-02-19 ENCOUNTER — TELEPHONE (OUTPATIENT)
Dept: FAMILY MEDICINE CLINIC | Facility: CLINIC | Age: 63
End: 2018-02-19

## 2018-02-19 ENCOUNTER — OFFICE VISIT (OUTPATIENT)
Dept: FAMILY MEDICINE CLINIC | Facility: CLINIC | Age: 63
End: 2018-02-19
Payer: COMMERCIAL

## 2018-02-19 VITALS
BODY MASS INDEX: 31.34 KG/M2 | HEART RATE: 70 BPM | WEIGHT: 166 LBS | HEIGHT: 61 IN | SYSTOLIC BLOOD PRESSURE: 110 MMHG | DIASTOLIC BLOOD PRESSURE: 80 MMHG | TEMPERATURE: 98.4 F

## 2018-02-19 DIAGNOSIS — H65.93 BILATERAL NON-SUPPURATIVE OTITIS MEDIA: Primary | ICD-10-CM

## 2018-02-19 PROBLEM — E55.9 VITAMIN D DEFICIENCY: Status: ACTIVE | Noted: 2017-04-13

## 2018-02-19 PROCEDURE — 99213 OFFICE O/P EST LOW 20 MIN: CPT | Performed by: NURSE PRACTITIONER

## 2018-02-19 RX ORDER — LEVOTHYROXINE SODIUM 0.1 MG/1
1 TABLET ORAL DAILY
COMMUNITY
End: 2018-11-12 | Stop reason: CLARIF

## 2018-02-19 RX ORDER — AMOXICILLIN 875 MG/1
875 TABLET, COATED ORAL 2 TIMES DAILY
Qty: 20 TABLET | Refills: 0 | Status: SHIPPED | OUTPATIENT
Start: 2018-02-19 | End: 2018-03-01

## 2018-02-19 RX ORDER — MULTIVIT-MIN/IRON/FOLIC ACID/K 18-600-40
2 CAPSULE ORAL 2 TIMES DAILY
COMMUNITY
End: 2018-06-18 | Stop reason: SDUPTHER

## 2018-02-19 RX ORDER — CHOLECALCIFEROL (VITAMIN D3) 1250 MCG
CAPSULE ORAL
COMMUNITY
Start: 2017-07-10 | End: 2018-06-18 | Stop reason: ALTCHOICE

## 2018-02-19 NOTE — PROGRESS NOTES
Patient ID: Jennifer Mcmahon is a 65 y o  female  HPI: 65 y  o female presenting with symptoms of bilateral ear pain/pressure and watery eyes  She reports have had a viral infection/sinusitis two weeks ago but symptoms resolved until two days ago when she developed above mentioned symptoms  Denies fever, chills or generalized body aches being associated with above mentioned symptoms  SUBJECTIVE    No family history on file  Social History     Social History    Marital status: /Civil Union     Spouse name: N/A    Number of children: N/A    Years of education: N/A     Occupational History    Not on file       Social History Main Topics    Smoking status: Never Smoker    Smokeless tobacco: Former User    Alcohol use No    Drug use: No    Sexual activity: Not on file     Other Topics Concern    Not on file     Social History Narrative    No narrative on file     Past Medical History:   Diagnosis Date    Disease of thyroid gland     Hypertension      Past Surgical History:   Procedure Laterality Date     SECTION      RI LAP,CHOLECYSTECTOMY N/A 11/15/2017    Procedure: CHOLECYSTECTOMY LAPAROSCOPIC, umbilical hernia repair;  Surgeon: Miranda Gallagher MD;  Location: [facility];  Service: General    TONSILLECTOMY       Allergies   Allergen Reactions    Morphine GI Intolerance     Reaction Date: 2011;     Escitalopram Rash     Reaction Date: 2011;        Current Outpatient Prescriptions:     amLODIPine (NORVASC) 5 mg tablet, Take 5 mg by mouth daily  , Disp: , Rfl:     Ascorbic Acid (VITAMIN C) 500 MG CAPS, Take by mouth, Disp: , Rfl:     Ascorbic Acid (VITAMIN C) 500 MG CAPS, Take 2 capsules by mouth 2 (two) times a day, Disp: , Rfl:     Cetirizine HCl (ZYRTEC ALLERGY) 10 MG CAPS, Take by mouth, Disp: , Rfl:     Cetirizine HCl (ZYRTEC ALLERGY) 10 MG CAPS, Take 1 tablet by mouth daily as needed, Disp: , Rfl:     Cholecalciferol (VITAMIN D3) 03836 units CAPS, Take by mouth, Disp: , Rfl:     fluticasone (FLONASE) 50 mcg/act nasal spray, into each nostril, Disp: , Rfl:     levothyroxine (SYNTHROID) 100 mcg tablet, Take 1 tablet by mouth daily, Disp: , Rfl:     levothyroxine 100 mcg tablet, Take 1 tablet (100 mcg total) by mouth daily for 90 days Please dispense brand name synthroid onmiany, Disp: 90 tablet, Rfl: 2    losartan (COZAAR) 100 MG tablet, Take by mouth, Disp: , Rfl:     potassium chloride (MICRO-K) 10 MEQ CR capsule, Take 20 mEq by mouth daily  , Disp: , Rfl:     spironolactone (ALDACTONE) 25 mg tablet, Take by mouth as needed  , Disp: , Rfl:     amoxicillin (AMOXIL) 875 mg tablet, Take 1 tablet (875 mg total) by mouth 2 (two) times a day for 10 days, Disp: 20 tablet, Rfl: 0    Cholecalciferol (VITAMIN D PO), Take by mouth, Disp: , Rfl:     Review of Systems    Consitutional:  Denies, chills, fatigue and fever   ENT:  Positive for, ear pain/pressure and itchy/watery eyes Denies, nasal discharge, nasal congestion, post nasal drip and sore throat  Pulmonary:  No cough, shortness of breath, dyspnea on exertion    Cardiovascular:  Denies chest pain/pressure   Abdomen:   Denies abdominal pain, nausea, vomiting, diarrhea, constipation    Genitourinary:  no urinary symptoms   Hematology/Lymphatics:   Denies, bruising, jaundice, night sweats and Swollen lymph nodes   Musculoskeletal:  No gait disturbance, myalgia,  arthalgia or muscle weakness   Integumentary:  No ecchymosis, petechiae ,rash or lesions   Neurological:  Denies headaches, dizziness, confusion, loss of consciousness or behavioral changes  Psychological:  Denies anxiety, depression or sleep disturbances      OBJECTIVE    Constitutional:  Well appearing and no acute distress  ENT:  Bilateral TMs dull and erythematous with positive bilateral effusions, posterior pharynx erythematous, post nasal drip noted and nasal mucosa pale and congested   Pulmonary:  clear to auscultation bilaterally and no crackles, no wheezes, chest expansion normal  Cardiovascular:  S1S2, regular rate and rhythm  Gastrointestinal:  abdomen is soft without significant tenderness  Lymphatic:  no lymphadenopathy   Musculoskeletal:  no joint tenderness, deformity or swelling, no muscular tenderness noted  Skin:  warm, no rashes, no ecchymosis  Neurologic:  Alert and oriented x 4, Normal Reflexes and Affect and mood normal      Assessment/Plan:  Diagnoses and all orders for this visit:    Bilateral non-suppurative otitis media  -     amoxicillin (AMOXIL) 875 mg tablet; Take 1 tablet (875 mg total) by mouth 2 (two) times a day for 10 days    Other orders  -     Ascorbic Acid (VITAMIN C) 500 MG CAPS; Take 2 capsules by mouth 2 (two) times a day  -     Cholecalciferol (VITAMIN D3) 77054 units CAPS; Take by mouth  -     Cetirizine HCl (ZYRTEC ALLERGY) 10 MG CAPS; Take 1 tablet by mouth daily as needed  -     levothyroxine (SYNTHROID) 100 mcg tablet;  Take 1 tablet by mouth daily        Reviewed with patient plan to treat with amoxicillin for 10 days - patient agreeable to plan  Patient instructed to call in 72 hours if not feeling better or if symptoms worsen

## 2018-02-20 DIAGNOSIS — I10 ESSENTIAL HYPERTENSION: Primary | ICD-10-CM

## 2018-02-20 RX ORDER — LOSARTAN POTASSIUM 100 MG/1
100 TABLET ORAL DAILY
Qty: 30 TABLET | Refills: 3 | Status: SHIPPED | OUTPATIENT
Start: 2018-02-20 | End: 2018-04-26 | Stop reason: SDUPTHER

## 2018-03-15 DIAGNOSIS — R60.9 EDEMA, UNSPECIFIED TYPE: ICD-10-CM

## 2018-03-15 DIAGNOSIS — I10 ESSENTIAL HYPERTENSION: Primary | ICD-10-CM

## 2018-03-15 RX ORDER — POTASSIUM CHLORIDE 750 MG/1
20 CAPSULE, EXTENDED RELEASE ORAL DAILY
Qty: 180 CAPSULE | Refills: 3 | Status: SHIPPED | OUTPATIENT
Start: 2018-03-15 | End: 2019-03-06 | Stop reason: SDUPTHER

## 2018-03-15 RX ORDER — AMLODIPINE BESYLATE 5 MG/1
TABLET ORAL
Qty: 135 TABLET | Refills: 3 | Status: SHIPPED | OUTPATIENT
Start: 2018-03-15 | End: 2019-06-10 | Stop reason: SDUPTHER

## 2018-03-27 ENCOUNTER — OFFICE VISIT (OUTPATIENT)
Dept: FAMILY MEDICINE CLINIC | Facility: CLINIC | Age: 63
End: 2018-03-27
Payer: COMMERCIAL

## 2018-03-27 VITALS
BODY MASS INDEX: 31.91 KG/M2 | DIASTOLIC BLOOD PRESSURE: 82 MMHG | TEMPERATURE: 97.2 F | HEIGHT: 61 IN | SYSTOLIC BLOOD PRESSURE: 140 MMHG | WEIGHT: 169 LBS | HEART RATE: 70 BPM

## 2018-03-27 DIAGNOSIS — I10 ESSENTIAL HYPERTENSION: ICD-10-CM

## 2018-03-27 DIAGNOSIS — E03.9 HYPOTHYROIDISM, UNSPECIFIED TYPE: ICD-10-CM

## 2018-03-27 DIAGNOSIS — L57.0 ACTINIC KERATOSIS: ICD-10-CM

## 2018-03-27 DIAGNOSIS — E55.9 VITAMIN D DEFICIENCY: ICD-10-CM

## 2018-03-27 DIAGNOSIS — R63.8 INCREASED BMI: Primary | ICD-10-CM

## 2018-03-27 DIAGNOSIS — H66.90 ACUTE OTITIS MEDIA, UNSPECIFIED OTITIS MEDIA TYPE: ICD-10-CM

## 2018-03-27 DIAGNOSIS — R53.83 OTHER FATIGUE: ICD-10-CM

## 2018-03-27 DIAGNOSIS — E78.00 HYPERCHOLESTEROLEMIA: ICD-10-CM

## 2018-03-27 PROCEDURE — 17000 DESTRUCT PREMALG LESION: CPT | Performed by: FAMILY MEDICINE

## 2018-03-27 PROCEDURE — 99214 OFFICE O/P EST MOD 30 MIN: CPT | Performed by: FAMILY MEDICINE

## 2018-03-27 RX ORDER — DOXYCYCLINE HYCLATE 100 MG/1
100 CAPSULE ORAL EVERY 12 HOURS SCHEDULED
Qty: 20 CAPSULE | Refills: 0 | Status: SHIPPED | OUTPATIENT
Start: 2018-03-27 | End: 2018-04-06

## 2018-03-28 NOTE — PROGRESS NOTES
Patient ID: Marisol Lundy is a 65 y o  female  HPI: 65 y  o female presenting for f/u htn, hypothyrodiism, and hypercholesterolemia  She would also like to meet with a weight loss specialist  In light of hte fact that she failed several meds to date  SUBJECTIVE    No family history on file  Social History     Social History    Marital status: /Civil Union     Spouse name: N/A    Number of children: N/A    Years of education: N/A     Occupational History    Not on file       Social History Main Topics    Smoking status: Never Smoker    Smokeless tobacco: Former User    Alcohol use No    Drug use: No    Sexual activity: Not on file     Other Topics Concern    Not on file     Social History Narrative    No narrative on file     Past Medical History:   Diagnosis Date    Disease of thyroid gland     Hypertension      Past Surgical History:   Procedure Laterality Date     SECTION      [place] LAP,CHOLECYSTECTOMY N/A 11/15/2017    Procedure: CHOLECYSTECTOMY LAPAROSCOPIC, umbilical hernia repair;  Surgeon: Ayla Lucia MD;  Location: [facility];  Service: General    TONSILLECTOMY       Allergies   Allergen Reactions    Morphine GI Intolerance     Reaction Date: 2011;     Escitalopram Rash     Reaction Date: 2011;        Current Outpatient Prescriptions:     amLODIPine (NORVASC) 5 mg tablet, 1 1/2 TAB DAILY, Disp: 135 tablet, Rfl: 3    Ascorbic Acid (VITAMIN C) 500 MG CAPS, Take by mouth, Disp: , Rfl:     Cetirizine HCl (ZYRTEC ALLERGY) 10 MG CAPS, Take by mouth, Disp: , Rfl:     Cholecalciferol (VITAMIN D PO), Take by mouth, Disp: , Rfl:     Cholecalciferol (VITAMIN D3) 69111 units CAPS, Take by mouth, Disp: , Rfl:     fluticasone (FLONASE) 50 mcg/act nasal spray, into each nostril, Disp: , Rfl:     levothyroxine (SYNTHROID) 100 mcg tablet, Take 1 tablet by mouth daily, Disp: , Rfl:     losartan (COZAAR) 100 MG tablet, Take 1 tablet (100 mg total) by mouth daily, Disp: 30 tablet, Rfl: 3    potassium chloride (MICRO-K) 10 MEQ CR capsule, Take 2 capsules (20 mEq total) by mouth daily, Disp: 180 capsule, Rfl: 3    spironolactone (ALDACTONE) 25 mg tablet, Take by mouth as needed  , Disp: , Rfl:     Ascorbic Acid (VITAMIN C) 500 MG CAPS, Take 2 capsules by mouth 2 (two) times a day, Disp: , Rfl:     Cetirizine HCl (ZYRTEC ALLERGY) 10 MG CAPS, Take 1 tablet by mouth daily as needed, Disp: , Rfl:     doxycycline hyclate (VIBRAMYCIN) 100 mg capsule, Take 1 capsule (100 mg total) by mouth every 12 (twelve) hours for 10 days, Disp: 20 capsule, Rfl: 0    levothyroxine 100 mcg tablet, Take 1 tablet (100 mcg total) by mouth daily for 90 days Please dispense brand name synthroid onlly, Disp: 90 tablet, Rfl: 2    Review of Systems  Constitutional:     Denies fever, chills ,fatigue ,weakness ,weight loss,+ difficulty taking off weight  ENT: Denies earache ,loss of hearing ,nosebleed, nasal discharge,nasal congestion ,sore throat ,hoarseness  Pulmonary: Denies shortness of breath ,cough  ,dyspnea on exertion, orthopnea  ,PND   Cardiovascular:  Denies bradycardia , tachycardia  ,palpations, lower extremity edema leg, claudication  Breast:  Denies new or changing breast lumps ,nipple discharge ,nipple changes  Abdomen:  Denies abdominal pain , anorexia , indigestion, nausea, vomiting, constipation, diarrhea  Musculoskeletal: Denies myalgias, arthralgias, joint swelling, joint stiffness , limb pain, limb swelling  Gu: denies dysuria, polyuria  Skin: Denies skin rash,  skin wound, itching, dry skin +unhealing scab on left wrist   Neuro: Denies headache, numbness, tingling, confusion, loss of consciousness, dizziness, vertigo  Psychiatric: Denies feelings of depression, suicidal ideation, anxiety, sleep disturbances    OBJECTIVE    Constitutional:   NAD, well appearing and well nourished      ENT:   Conjunctiva and lids: no injection, edema, or discharge     Pupils and iris: ELAINE bilaterally    External inspection of ears and nose: normal without deformities or discharge  Otoscopic exam: Canals patent without erythema  Nasal mucosa, septum and turbinates: Normal or edema or discharge         Oropharynx:  Moist mucosa, normal tongue and tonsils without lesions  No erythema        Pulmonary:Respiratory effort normal rate and rhythm, no increased work of breathing  Auscultation of lungs:  Clear bilaterally with no adventitious breath sounds       Cardiovascular: regular rate and rhythm, S1 and S2, no murmur, no edema and/or varicosities of LE      Abdomen: Soft and non-distended     Positive bowel sounds      No heptomegaly or splenomegaly      Gu: no suprapubic tenderness or CVA tenderness, no urethral discharge  Lymphatic:  No anterior or posterior cervical lymphadenopathy         Musculoskeletal:  Gait and station: Normal gait      Digits and nails normal without clubbing or cyanosis       Inspection/palpation of joints, bones, and muscles:  No joint tenderness, swelling, full active and passive range of motion       Skin: Normal skin turgor and no rashes  + white flakey scabbed lesion on left forearm which had cryotherapy applied in three intervals of 30 sec each     Neuro:      Normal reflexes       Psych:   alert and oriented to person, place and time     normal mood and affect       Assessment/Plan:Diagnoses and all orders for this visit:    Increased BMI  -     Ambulatory referral to Weight Management; Future    Essential hypertension  -     Comprehensive metabolic panel; Future    Hypothyroidism, unspecified type  -     TSH baseline; Future    Hypercholesterolemia  -     Lipid Panel with Direct LDL reflex; Future    Vitamin D deficiency  -     Vitamin D 25 hydroxy; Future    Other fatigue  -     Thyroglobulin, Quant w/ AB (Leonore); Future    Acute otitis media, unspecified otitis media type  -     doxycycline hyclate (VIBRAMYCIN) 100 mg capsule;  Take 1 capsule (100 mg total) by mouth every 12 (twelve) hours for 10 days      Reviewed with patient that lesion should fall off in about one week  I will see patient back in 4 mos or sooner prn

## 2018-04-09 ENCOUNTER — APPOINTMENT (OUTPATIENT)
Dept: LAB | Facility: CLINIC | Age: 63
End: 2018-04-09
Payer: COMMERCIAL

## 2018-04-09 ENCOUNTER — TRANSCRIBE ORDERS (OUTPATIENT)
Dept: LAB | Facility: CLINIC | Age: 63
End: 2018-04-09

## 2018-04-09 DIAGNOSIS — I10 ESSENTIAL HYPERTENSION: ICD-10-CM

## 2018-04-09 DIAGNOSIS — E03.9 HYPOTHYROIDISM, UNSPECIFIED TYPE: ICD-10-CM

## 2018-04-09 DIAGNOSIS — E55.9 VITAMIN D DEFICIENCY: ICD-10-CM

## 2018-04-09 DIAGNOSIS — E78.00 HYPERCHOLESTEROLEMIA: ICD-10-CM

## 2018-04-09 DIAGNOSIS — R53.83 OTHER FATIGUE: ICD-10-CM

## 2018-04-09 LAB
25(OH)D3 SERPL-MCNC: 36.4 NG/ML (ref 30–100)
ALBUMIN SERPL BCP-MCNC: 3.7 G/DL (ref 3.5–5)
ALP SERPL-CCNC: 139 U/L (ref 46–116)
ALT SERPL W P-5'-P-CCNC: 26 U/L (ref 12–78)
ANION GAP SERPL CALCULATED.3IONS-SCNC: 9 MMOL/L (ref 4–13)
AST SERPL W P-5'-P-CCNC: 21 U/L (ref 5–45)
BILIRUB SERPL-MCNC: 0.5 MG/DL (ref 0.2–1)
BUN SERPL-MCNC: 11 MG/DL (ref 5–25)
CALCIUM SERPL-MCNC: 8.6 MG/DL
CHLORIDE SERPL-SCNC: 105 MMOL/L (ref 100–108)
CHOLEST SERPL-MCNC: 198 MG/DL (ref 50–200)
CO2 SERPL-SCNC: 29 MMOL/L (ref 21–32)
CREAT SERPL-MCNC: 0.75 MG/DL (ref 0.6–1.3)
GFR SERPL CREATININE-BSD FRML MDRD: 86 ML/MIN/1.73SQ M
GLUCOSE P FAST SERPL-MCNC: 102 MG/DL (ref 65–99)
HDLC SERPL-MCNC: 53 MG/DL (ref 40–60)
LDLC SERPL CALC-MCNC: 127 MG/DL (ref 0–100)
POTASSIUM SERPL-SCNC: 3.8 MMOL/L (ref 3.5–5.3)
PROT SERPL-MCNC: 6.9 G/DL (ref 6.4–8.2)
SODIUM SERPL-SCNC: 143 MMOL/L (ref 136–145)
TRIGL SERPL-MCNC: 88 MG/DL
TSH SERPL DL<=0.05 MIU/L-ACNC: 5.97 UIU/ML

## 2018-04-09 PROCEDURE — 36415 COLL VENOUS BLD VENIPUNCTURE: CPT

## 2018-04-09 PROCEDURE — 84432 ASSAY OF THYROGLOBULIN: CPT

## 2018-04-09 PROCEDURE — 80061 LIPID PANEL: CPT

## 2018-04-09 PROCEDURE — 84443 ASSAY THYROID STIM HORMONE: CPT

## 2018-04-09 PROCEDURE — 82306 VITAMIN D 25 HYDROXY: CPT

## 2018-04-09 PROCEDURE — 86800 THYROGLOBULIN ANTIBODY: CPT

## 2018-04-09 PROCEDURE — 80053 COMPREHEN METABOLIC PANEL: CPT

## 2018-04-14 LAB
THYROGLOB AB SERPL-ACNC: 44.2 IU/ML (ref 0–0.9)
THYROGLOB SERPL-MCNC: 12 NG/ML

## 2018-04-16 DIAGNOSIS — E06.0 THYROIDITIS, ACUTE: Primary | ICD-10-CM

## 2018-04-16 RX ORDER — PREDNISONE 10 MG/1
TABLET ORAL
Qty: 26 TABLET | Refills: 0 | Status: SHIPPED | OUTPATIENT
Start: 2018-04-16 | End: 2018-06-18 | Stop reason: ALTCHOICE

## 2018-04-26 DIAGNOSIS — I10 ESSENTIAL HYPERTENSION: ICD-10-CM

## 2018-04-26 RX ORDER — LOSARTAN POTASSIUM 100 MG/1
100 TABLET ORAL DAILY
Qty: 90 TABLET | Refills: 3 | Status: SHIPPED | OUTPATIENT
Start: 2018-04-26 | End: 2019-05-21 | Stop reason: SDUPTHER

## 2018-06-18 ENCOUNTER — OFFICE VISIT (OUTPATIENT)
Dept: FAMILY MEDICINE CLINIC | Facility: CLINIC | Age: 63
End: 2018-06-18
Payer: COMMERCIAL

## 2018-06-18 VITALS
HEIGHT: 61 IN | SYSTOLIC BLOOD PRESSURE: 124 MMHG | HEART RATE: 72 BPM | TEMPERATURE: 97.1 F | DIASTOLIC BLOOD PRESSURE: 82 MMHG | WEIGHT: 163 LBS | BODY MASS INDEX: 30.78 KG/M2

## 2018-06-18 DIAGNOSIS — J01.80 OTHER ACUTE SINUSITIS, RECURRENCE NOT SPECIFIED: Primary | ICD-10-CM

## 2018-06-18 PROCEDURE — 99213 OFFICE O/P EST LOW 20 MIN: CPT | Performed by: FAMILY MEDICINE

## 2018-06-18 PROCEDURE — 3008F BODY MASS INDEX DOCD: CPT | Performed by: FAMILY MEDICINE

## 2018-06-18 RX ORDER — AZELASTINE 1 MG/ML
1 SPRAY, METERED NASAL 2 TIMES DAILY
Qty: 30 ML | Refills: 0 | Status: SHIPPED | OUTPATIENT
Start: 2018-06-18 | End: 2018-07-15 | Stop reason: SDUPTHER

## 2018-06-18 RX ORDER — CEFUROXIME AXETIL 500 MG/1
500 TABLET ORAL EVERY 12 HOURS SCHEDULED
Qty: 20 TABLET | Refills: 0 | Status: SHIPPED | OUTPATIENT
Start: 2018-06-18 | End: 2018-06-28

## 2018-06-18 RX ORDER — PREDNISONE 10 MG/1
TABLET ORAL
Qty: 26 TABLET | Refills: 0 | Status: SHIPPED | OUTPATIENT
Start: 2018-06-18 | End: 2019-01-21 | Stop reason: ALTCHOICE

## 2018-06-19 NOTE — PROGRESS NOTES
Patient ID: Katherine Redding is a 65 y o  female  HPI: 65 y  o female presenting with symptoms of sinus pain,pressure, nasal congestion, pnd dry cough , ear and throat pain  SUBJECTIVE    Family History   Problem Relation Age of Onset    No Known Problems Mother     Diabetes Father         Mellitus    Heart disease Father     Hypertension Father      Social History     Social History    Marital status: /Civil Union     Spouse name: N/A    Number of children: N/A    Years of education: N/A     Occupational History    Not on file       Social History Main Topics    Smoking status: Never Smoker    Smokeless tobacco: Former User    Alcohol use No    Drug use: No    Sexual activity: Not on file     Other Topics Concern    Not on file     Social History Narrative    Denied: History of daily coffee consumption    Daily cola consumption         Past Medical History:   Diagnosis Date    Disease of thyroid gland     Hypertension      Past Surgical History:   Procedure Laterality Date     SECTION      [place] LAP,CHOLECYSTECTOMY N/A 11/15/2017    Procedure: CHOLECYSTECTOMY LAPAROSCOPIC, umbilical hernia repair;  Surgeon: Deonna Braxton MD;  [facility]: [facility];  Service: General    TONSILLECTOMY       Allergies   Allergen Reactions    Morphine GI Intolerance     Reaction Date: 2011;     Escitalopram Rash     Reaction Date: 2011;        Current Outpatient Prescriptions:     amLODIPine (NORVASC) 5 mg tablet, 1 1/2 TAB DAILY, Disp: 135 tablet, Rfl: 3    Ascorbic Acid (VITAMIN C) 500 MG CAPS, Take by mouth, Disp: , Rfl:     Cetirizine HCl (ZYRTEC ALLERGY) 10 MG CAPS, Take by mouth, Disp: , Rfl:     fluticasone (FLONASE) 50 mcg/act nasal spray, into each nostril, Disp: , Rfl:     levothyroxine (SYNTHROID) 100 mcg tablet, Take 1 tablet by mouth daily, Disp: , Rfl:     losartan (COZAAR) 100 MG tablet, Take 1 tablet (100 mg total) by mouth daily, Disp: 90 tablet, Rfl: 3   potassium chloride (MICRO-K) 10 MEQ CR capsule, Take 2 capsules (20 mEq total) by mouth daily, Disp: 180 capsule, Rfl: 3    azelastine (ASTELIN) 0 1 % nasal spray, 1 spray into each nostril 2 (two) times a day Use in each nostril as directed, Disp: 30 mL, Rfl: 0    cefuroxime (CEFTIN) 500 mg tablet, Take 1 tablet (500 mg total) by mouth every 12 (twelve) hours for 10 days, Disp: 20 tablet, Rfl: 0    predniSONE 10 mg tablet, 3 tabs po bid x2 days, then 2 tabs po bid x2 days, then 1 tab bid x2 days, then 1 daily until done , Disp: 26 tablet, Rfl: 0    spironolactone (ALDACTONE) 25 mg tablet, Take by mouth as needed  , Disp: , Rfl:     Review of Systems  Constitutional:     Denies fever, chills, fatigue, weakness ,weight loss, weight gain      ENT: Denies earache, loss of hearing, nosebleed, nasal discharge,but complains of nasal congestion, sore throat,hoarseness and sinus pain and pressure    Pulmonary: Denies shortness of breath ,cough , dyspnea on exertionon, orthopnea ,+ PND   Cardiovascular:  Denies bradycardia , tachycardia ,palpations, lower extremity, edema leg, claudication  Breast:  Denies new or changing breast lumps,  nipple discharge, nipple changes,  Abdomen:  Denies abdominal pain , anorexia ,indigestion, nausea ,vomiting, constipation , diarrhea  Musculoskeletal: Denies myalgias, arthralgias, joint swelling, joint stiffness ,limb pain, limb swelling  Lymph:+ swollen glands  Gu: no dysuria or urinary frequency  Skin: Denies skin rash, skin lesion, skin wound, itching,dry skin  Neuro: Denies headache, numbness, tingling, confusion, loss of consciousness, dizziness ,vertigo  Psychiatric: Denies feelings of depression, suicidal ideation, anxiety, sleep disturbances    OBJECTIVE    Constitutional:   NAD, well appearing and well nourished      ENT:   Conjunctiva and lids: no injection, edema, or discharge    Pupils and iris: ELAINE bilaterally   External inspection of ears and nose: normal without deformities or discharge  Otoscopic exam: Canals patent ; tm are dull, with with erythem and effusions  ENasal mucosa, septum and turbinates: Turbinae injection with discharge   Oropharynx:  Moist mucosa, normal tongue and tonsils without lesions  Erythema and injection  of post pharynx with pnd      Pulmonary:Respiratory effort normal rate and rhythm, no increased work of breathing  Auscultation of lungs:  Clear bilaterally with no adventitious breath sounds       Cardiovascular: regular rate and rhythm, S1 and S2, no murmur, no edema and/or varicosities of LE      Abdomen: Soft and non-distended    Positive bowel sounds    No heptomegaly or splenomegaly    Lymphatic: Anterior  cervical lymphadenopathy         Muscskeletal:  Gait and station: Normal gait     Digits and nails normal without clubbing or cyanosis     Inspection/palpation of joints, bones, and muscles:  No joint tenderness, swelling, full active and passive range of motion      Gu: no suprabubic tenderness, CVA tenderness or urethral discharge  Skin: Normal skin turgor and no rashes    Neuro:    Normal reflexes   Psych:   alert and oriented to person, place and time  normal mood and affec      Assessment/Plan:Diagnoses and all orders for this visit:    Other acute sinusitis, recurrence not specified  -     predniSONE 10 mg tablet; 3 tabs po bid x2 days, then 2 tabs po bid x2 days, then 1 tab bid x2 days, then 1 daily until done  -     cefuroxime (CEFTIN) 500 mg tablet; Take 1 tablet (500 mg total) by mouth every 12 (twelve) hours for 10 days  -     azelastine (ASTELIN) 0 1 % nasal spray; 1 spray into each nostril 2 (two) times a day Use in each nostril as directed        Reviewed with patient plan to treat with above      Patient instructed to call in 72 hours if not feeling better or if symptoms worsen

## 2018-07-15 DIAGNOSIS — J01.80 OTHER ACUTE SINUSITIS, RECURRENCE NOT SPECIFIED: ICD-10-CM

## 2018-07-16 RX ORDER — AZELASTINE HYDROCHLORIDE 137 UG/1
1 SPRAY, METERED NASAL 2 TIMES DAILY
Qty: 30 ML | Refills: 3 | Status: SHIPPED | OUTPATIENT
Start: 2018-07-16 | End: 2020-07-23 | Stop reason: ALTCHOICE

## 2018-08-30 ENCOUNTER — OFFICE VISIT (OUTPATIENT)
Dept: FAMILY MEDICINE CLINIC | Facility: CLINIC | Age: 63
End: 2018-08-30
Payer: COMMERCIAL

## 2018-08-30 VITALS
HEIGHT: 65 IN | DIASTOLIC BLOOD PRESSURE: 82 MMHG | TEMPERATURE: 97.8 F | WEIGHT: 168.8 LBS | HEART RATE: 68 BPM | BODY MASS INDEX: 28.12 KG/M2 | SYSTOLIC BLOOD PRESSURE: 102 MMHG

## 2018-08-30 DIAGNOSIS — E03.9 HYPOTHYROIDISM, UNSPECIFIED TYPE: ICD-10-CM

## 2018-08-30 DIAGNOSIS — Z13.820 SCREENING FOR OSTEOPOROSIS: ICD-10-CM

## 2018-08-30 DIAGNOSIS — Z12.39 SCREENING FOR BREAST CANCER: ICD-10-CM

## 2018-08-30 DIAGNOSIS — Z12.4 SCREENING FOR CERVICAL CANCER: ICD-10-CM

## 2018-08-30 DIAGNOSIS — K21.9 GASTROESOPHAGEAL REFLUX DISEASE WITHOUT ESOPHAGITIS: Primary | ICD-10-CM

## 2018-08-30 PROCEDURE — 99214 OFFICE O/P EST MOD 30 MIN: CPT | Performed by: FAMILY MEDICINE

## 2018-08-30 RX ORDER — FAMOTIDINE 40 MG/1
40 TABLET, FILM COATED ORAL DAILY
Qty: 90 TABLET | Refills: 3 | Status: SHIPPED | OUTPATIENT
Start: 2018-08-30 | End: 2019-05-21 | Stop reason: SDUPTHER

## 2018-08-31 NOTE — PROGRESS NOTES
Patient ID: Loan Theodore is a 59 y o  female  HPI: 59 y  o female presents for follow up of GERD, hypothyroidism  She would like to try to switch from a PPI to an H2 blocker and is also in need of a dexa due to the longevity of amt of time she was on a PPI and thyroid disease  She will be coming back in for a pap  SUBJECTIVE    Family History   Problem Relation Age of Onset    No Known Problems Mother     Diabetes Father         Mellitus    Heart disease Father     Hypertension Father      Social History     Social History    Marital status: /Civil Union     Spouse name: N/A    Number of children: N/A    Years of education: N/A     Occupational History    Not on file       Social History Main Topics    Smoking status: Never Smoker    Smokeless tobacco: Former User    Alcohol use No    Drug use: No    Sexual activity: Not on file     Other Topics Concern    Not on file     Social History Narrative    Denied: History of daily coffee consumption    Daily cola consumption         Past Medical History:   Diagnosis Date    Disease of thyroid gland     Hypertension      Past Surgical History:   Procedure Laterality Date     SECTION      [place] LAP,CHOLECYSTECTOMY N/A 11/15/2017    Procedure: CHOLECYSTECTOMY LAPAROSCOPIC, umbilical hernia repair;  Surgeon: Catherine Dillon MD;  Location: [facility];  Service: General    TONSILLECTOMY       Allergies   Allergen Reactions    Morphine GI Intolerance     Reaction Date: 2011;     Escitalopram Rash     Reaction Date: 2011;        Current Outpatient Prescriptions:     amLODIPine (NORVASC) 5 mg tablet, 1 1/2 TAB DAILY, Disp: 135 tablet, Rfl: 3    Ascorbic Acid (VITAMIN C) 500 MG CAPS, Take by mouth, Disp: , Rfl:     Azelastine HCl 137 MCG/SPRAY SOLN, 1 SPRAY INTO EACH NOSTRIL 2 (TWO) TIMES A DAY USE IN EACH NOSTRIL AS DIRECTED, Disp: 30 mL, Rfl: 3    Cetirizine HCl (ZYRTEC ALLERGY) 10 MG CAPS, Take by mouth, Disp: , Rfl:    fluticasone (FLONASE) 50 mcg/act nasal spray, into each nostril, Disp: , Rfl:     levothyroxine (SYNTHROID) 100 mcg tablet, Take 1 tablet by mouth daily, Disp: , Rfl:     losartan (COZAAR) 100 MG tablet, Take 1 tablet (100 mg total) by mouth daily, Disp: 90 tablet, Rfl: 3    potassium chloride (MICRO-K) 10 MEQ CR capsule, Take 2 capsules (20 mEq total) by mouth daily, Disp: 180 capsule, Rfl: 3    spironolactone (ALDACTONE) 25 mg tablet, Take by mouth as needed  , Disp: , Rfl:     famotidine (PEPCID) 40 MG tablet, Take 1 tablet (40 mg total) by mouth daily for 90 days, Disp: 90 tablet, Rfl: 3    predniSONE 10 mg tablet, 3 tabs po bid x2 days, then 2 tabs po bid x2 days, then 1 tab bid x2 days, then 1 daily until done   (Patient not taking: Reported on 8/30/2018 ), Disp: 26 tablet, Rfl: 0    Review of Systems  Constitutional:     Denies fever, chills ,fatigue ,weakness ,weight loss, weight gain     ENT: Denies earache ,loss of hearing ,nosebleed, nasal discharge,nasal congestion ,sore throat ,hoarseness  Pulmonary: Denies shortness of breath ,cough  ,dyspnea on exertion, orthopnea  ,PND   Cardiovascular:  Denies bradycardia , tachycardia  ,palpations, lower extremity edema leg, claudication  Breast:  Denies new or changing breast lumps ,nipple discharge ,nipple changes  Abdomen:  Denies abdominal pain , anorexia , indigestion, nausea, vomiting, constipation, diarrhea  Musculoskeletal: Denies myalgias, arthralgias, joint swelling, joint stiffness , limb pain, limb swelling  Gu: denies dysuria, polyuria  Skin: Denies skin rash, skin lesion, skin wound, itching, dry skin  Neuro: Denies headache, numbness, tingling, confusion, loss of consciousness, dizziness, vertigo  Psychiatric: Denies feelings of depression, suicidal ideation, anxiety, sleep disturbances    OBJECTIVE  /82   Pulse 68   Temp 97 8 °F (36 6 °C)   Ht 5' 5" (1 651 m)   Wt 76 6 kg (168 lb 12 8 oz)   BMI 28 09 kg/m²   Constitutional:   NAD, well appearing and well nourished      ENT:   Conjunctiva and lids: no injection, edema, or discharge     Pupils and iris: ELAINE bilaterally    External inspection of ears and nose: normal without deformities or discharge  Otoscopic exam: Canals patent without erythema  Nasal mucosa, septum and turbinates: Normal or edema or discharge         Oropharynx:  Moist mucosa, normal tongue and tonsils without lesions  No erythema        Pulmonary:Respiratory effort normal rate and rhythm, no increased work of breathing  Auscultation of lungs:  Clear bilaterally with no adventitious breath sounds       Cardiovascular: regular rate and rhythm, S1 and S2, no murmur, no edema and/or varicosities of LE      Abdomen: Soft and non-distended     Positive bowel sounds      No heptomegaly or splenomegaly      Gu: no suprapubic tenderness or CVA tenderness, no urethral discharge  Lymphatic:  No anterior or posterior cervical lymphadenopathy         Musculoskeletal:  Gait and station: Normal gait      Digits and nails normal without clubbing or cyanosis       Inspection/palpation of joints, bones, and muscles:  No joint tenderness, swelling, full active and passive range of motion       Skin: Normal skin turgor and no rashes      Neuro:    Normal reflexes     Psych:   alert and oriented to person, place and time     normal mood and affect       Assessment/Plan:Diagnoses and all orders for this visit:    Gastroesophageal reflux disease without esophagitis  -     famotidine (PEPCID) 40 MG tablet; Take 1 tablet (40 mg total) by mouth daily for 90 days    Hypothyroidism, unspecified type  -     TSH, 3rd generation; Future    Screening for osteoporosis  -     DXA bone density spine hip and pelvis; Future    Screening for breast cancer  -     Mammo screening bilateral w cad; Future    Screening for cervical cancer    Other orders  -     Cancel: Ambulatory referral to Gynecology;  Future        I will call pt with pending results and she is to call if she has any breakthrough reflux symptoms on pepcid

## 2018-09-24 DIAGNOSIS — R60.9 EDEMA, UNSPECIFIED TYPE: Primary | ICD-10-CM

## 2018-09-24 RX ORDER — SPIRONOLACTONE 25 MG/1
25 TABLET ORAL DAILY
Qty: 30 TABLET | Refills: 3 | Status: SHIPPED | OUTPATIENT
Start: 2018-09-24 | End: 2019-10-22 | Stop reason: SDUPTHER

## 2018-10-24 DIAGNOSIS — E55.9 VITAMIN D DEFICIENCY: ICD-10-CM

## 2018-10-24 DIAGNOSIS — E78.00 HYPERCHOLESTEROLEMIA: ICD-10-CM

## 2018-10-24 DIAGNOSIS — E03.9 HYPOTHYROIDISM, UNSPECIFIED TYPE: Primary | ICD-10-CM

## 2018-11-09 ENCOUNTER — APPOINTMENT (OUTPATIENT)
Dept: LAB | Facility: CLINIC | Age: 63
End: 2018-11-09
Payer: COMMERCIAL

## 2018-11-09 ENCOUNTER — TRANSCRIBE ORDERS (OUTPATIENT)
Dept: LAB | Facility: CLINIC | Age: 63
End: 2018-11-09

## 2018-11-09 ENCOUNTER — TELEPHONE (OUTPATIENT)
Dept: FAMILY MEDICINE CLINIC | Facility: CLINIC | Age: 63
End: 2018-11-09

## 2018-11-09 DIAGNOSIS — E03.9 HYPOTHYROIDISM, UNSPECIFIED TYPE: ICD-10-CM

## 2018-11-09 DIAGNOSIS — E03.9 HYPOTHYROIDISM, UNSPECIFIED TYPE: Primary | ICD-10-CM

## 2018-11-09 DIAGNOSIS — E78.00 HYPERCHOLESTEROLEMIA: ICD-10-CM

## 2018-11-09 DIAGNOSIS — E55.9 VITAMIN D DEFICIENCY: ICD-10-CM

## 2018-11-09 LAB
25(OH)D3 SERPL-MCNC: 26.8 NG/ML (ref 30–100)
CHOLEST SERPL-MCNC: 194 MG/DL (ref 50–200)
HDLC SERPL-MCNC: 52 MG/DL (ref 40–60)
LDLC SERPL CALC-MCNC: 127 MG/DL (ref 0–100)
TRIGL SERPL-MCNC: 76 MG/DL
TSH SERPL DL<=0.05 MIU/L-ACNC: 8.99 UIU/ML (ref 0.36–3.74)

## 2018-11-09 PROCEDURE — 82306 VITAMIN D 25 HYDROXY: CPT

## 2018-11-09 PROCEDURE — 80061 LIPID PANEL: CPT

## 2018-11-09 PROCEDURE — 36415 COLL VENOUS BLD VENIPUNCTURE: CPT

## 2018-11-09 PROCEDURE — 84443 ASSAY THYROID STIM HORMONE: CPT

## 2018-11-09 RX ORDER — LEVOTHYROXINE SODIUM 112 UG/1
112 TABLET ORAL DAILY
Qty: 30 TABLET | Refills: 3 | Status: SHIPPED | OUTPATIENT
Start: 2018-11-09 | End: 2018-11-12 | Stop reason: CLARIF

## 2018-11-09 NOTE — TELEPHONE ENCOUNTER
Patient called stating [facility] just called her regarding the new medication being sent in  She states levothyroxine was called in but she has never taken levothyroxine, she has always taken synthroid   Can a new script for synthroid for the higher dose be called in?

## 2018-11-12 DIAGNOSIS — E03.9 HYPOTHYROIDISM, UNSPECIFIED TYPE: ICD-10-CM

## 2018-11-12 DIAGNOSIS — E03.9 HYPOTHYROIDISM, UNSPECIFIED TYPE: Primary | ICD-10-CM

## 2018-11-12 DIAGNOSIS — E55.9 VITAMIN D DEFICIENCY: Primary | ICD-10-CM

## 2018-11-12 RX ORDER — LEVOTHYROXINE SODIUM 112 UG/1
112 TABLET ORAL DAILY
Qty: 30 TABLET | Refills: 3 | Status: SHIPPED | OUTPATIENT
Start: 2018-11-12 | End: 2018-12-11 | Stop reason: SDUPTHER

## 2018-11-13 DIAGNOSIS — E87.6 HYPOKALEMIA: Primary | ICD-10-CM

## 2018-11-20 DIAGNOSIS — J30.9 ALLERGIC RHINITIS, UNSPECIFIED SEASONALITY, UNSPECIFIED TRIGGER: Primary | ICD-10-CM

## 2018-11-20 RX ORDER — FLUTICASONE PROPIONATE 50 MCG
SPRAY, SUSPENSION (ML) NASAL
Qty: 15.8 G | Refills: 5 | Status: SHIPPED | OUTPATIENT
Start: 2018-11-20 | End: 2019-10-22 | Stop reason: SDUPTHER

## 2018-11-27 ENCOUNTER — OFFICE VISIT (OUTPATIENT)
Dept: FAMILY MEDICINE CLINIC | Facility: CLINIC | Age: 63
End: 2018-11-27
Payer: COMMERCIAL

## 2018-11-27 VITALS
DIASTOLIC BLOOD PRESSURE: 100 MMHG | HEART RATE: 74 BPM | BODY MASS INDEX: 27.99 KG/M2 | WEIGHT: 168 LBS | HEIGHT: 65 IN | SYSTOLIC BLOOD PRESSURE: 168 MMHG | TEMPERATURE: 97.6 F

## 2018-11-27 DIAGNOSIS — H66.90 ACUTE OTITIS MEDIA, UNSPECIFIED OTITIS MEDIA TYPE: ICD-10-CM

## 2018-11-27 DIAGNOSIS — R42 VERTIGO: Primary | ICD-10-CM

## 2018-11-27 PROCEDURE — 1036F TOBACCO NON-USER: CPT | Performed by: FAMILY MEDICINE

## 2018-11-27 PROCEDURE — 99213 OFFICE O/P EST LOW 20 MIN: CPT | Performed by: FAMILY MEDICINE

## 2018-11-27 PROCEDURE — 3008F BODY MASS INDEX DOCD: CPT | Performed by: FAMILY MEDICINE

## 2018-11-27 RX ORDER — AZITHROMYCIN 250 MG/1
TABLET, FILM COATED ORAL
Qty: 6 TABLET | Refills: 0 | Status: SHIPPED | OUTPATIENT
Start: 2018-11-27 | End: 2018-12-01

## 2018-11-27 RX ORDER — MECLIZINE HYDROCHLORIDE 25 MG/1
25 TABLET ORAL EVERY 8 HOURS SCHEDULED
Qty: 30 TABLET | Refills: 0 | Status: SHIPPED | OUTPATIENT
Start: 2018-11-27 | End: 2019-04-22 | Stop reason: ALTCHOICE

## 2018-11-27 RX ORDER — PREDNISONE 10 MG/1
TABLET ORAL
Qty: 26 TABLET | Refills: 0 | Status: SHIPPED | OUTPATIENT
Start: 2018-11-27 | End: 2019-01-21 | Stop reason: ALTCHOICE

## 2018-11-27 NOTE — PROGRESS NOTES
Patient ID: James Clark is a 59 y o  female  HPI: 59 y  o female presenting with symptoms of ear pressure, crackling of ears and dizziness associated with positional changes  SUBJECTIVE    Family History   Problem Relation Age of Onset    No Known Problems Mother     Diabetes Father         Mellitus    Heart disease Father     Hypertension Father      Social History     Social History    Marital status: /Civil Union     Spouse name: N/A    Number of children: N/A    Years of education: N/A     Occupational History    Not on file       Social History Main Topics    Smoking status: Never Smoker    Smokeless tobacco: Former User    Alcohol use No    Drug use: No    Sexual activity: Not on file     Other Topics Concern    Not on file     Social History Narrative    Denied: History of daily coffee consumption    Daily cola consumption         Past Medical History:   Diagnosis Date    Disease of thyroid gland     Hypertension      Past Surgical History:   Procedure Laterality Date     SECTION      SC LAP,CHOLECYSTECTOMY N/A 11/15/2017    Procedure: CHOLECYSTECTOMY LAPAROSCOPIC, umbilical hernia repair;  Surgeon: Pro Charlton MD;  [facility];  Service: General    TONSILLECTOMY       Allergies   Allergen Reactions    Morphine GI Intolerance     Reaction Date: 2011;     Escitalopram Rash     Reaction Date: 2011;        Current Outpatient Prescriptions:     amLODIPine (NORVASC) 5 mg tablet, 1 1/2 TAB DAILY, Disp: 135 tablet, Rfl: 3    Ascorbic Acid (VITAMIN C) 500 MG CAPS, Take by mouth, Disp: , Rfl:     Azelastine HCl 137 MCG/SPRAY SOLN, 1 SPRAY INTO EACH NOSTRIL 2 (TWO) TIMES A DAY USE IN EACH NOSTRIL AS DIRECTED, Disp: 30 mL, Rfl: 3    Cetirizine HCl (ZYRTEC ALLERGY) 10 MG CAPS, Take by mouth, Disp: , Rfl:     famotidine (PEPCID) 40 MG tablet, Take 1 tablet (40 mg total) by mouth daily for 90 days, Disp: 90 tablet, Rfl: 3    fluticasone (FLONASE) 50 mcg/act nasal spray, USE 2 SPRAYS IN EACH NOSTRIL ONCE DAILY, Disp: 15 8 g, Rfl: 5    levothyroxine 112 mcg tablet, Take 1 tablet (112 mcg total) by mouth daily, Disp: 30 tablet, Rfl: 3    losartan (COZAAR) 100 MG tablet, Take 1 tablet (100 mg total) by mouth daily, Disp: 90 tablet, Rfl: 3    potassium chloride (MICRO-K) 10 MEQ CR capsule, Take 2 capsules (20 mEq total) by mouth daily, Disp: 180 capsule, Rfl: 3    predniSONE 10 mg tablet, 3 tabs po bid x2 days, then 2 tabs po bid x2 days, then 1 tab bid x2 days, then 1 daily until done , Disp: 26 tablet, Rfl: 0    spironolactone (ALDACTONE) 25 mg tablet, Take by mouth as needed  , Disp: , Rfl:     spironolactone (ALDACTONE) 25 mg tablet, Take 1 tablet (25 mg total) by mouth daily, Disp: 30 tablet, Rfl: 3    azithromycin (ZITHROMAX) 250 mg tablet, Take 2 tablets today then 1 tablet daily x 4 days, Disp: 6 tablet, Rfl: 0    meclizine (ANTIVERT) 25 mg tablet, Take 1 tablet (25 mg total) by mouth every 8 (eight) hours, Disp: 30 tablet, Rfl: 0    predniSONE 10 mg tablet, 3 tabs po bid x2 days, then 2 tabs po bid x2 days, then 1 tab bid x2 days, then 1 daily until done , Disp: 26 tablet, Rfl: 0    Review of Systems  Constitutional:     Denies fever, chills, fatigue, weakness ,weight loss, weight gain       ENT: Denies earache, loss of hearing, nosebleed, nasal discharge,nasal congestion, sore throat,hoarseness, but complains of ear pressure,and crackling    Pulmonary: Denies shortness of breath ,cough , dyspnea on exertionon, orthopnea , PND   Cardiovascular:  Denies bradycardia , tachycardia ,palpations, lower extremity, edema leg, claudication  Breast:  Denies new or changing breast lumps,  nipple discharge, nipple changes,  Abdomen:  Denies abdominal pain , anorexia ,indigestion, nausea ,vomiting, constipation , diarrhea  Musculoskeletal: Denies myalgias, arthralgias, joint swelling, joint stiffness ,limb pain, limb swelling  Lymph:denies swollen glands  Gu: no dysuria or urinary frequency  Skin: Denies skin rash, skin lesion, skin wound, itching,dry skin  Neuro: Denies headache, numbness, tingling, confusion, loss of consciousness, but complains of postitional vertigo  Psychiatric: Denies feelings of depression, suicidal ideation, anxiety, sleep disturbances    OBJECTIVE  /100 (BP Location: Right arm, Patient Position: Sitting, Cuff Size: Adult)   Pulse 74   Temp 97 6 °F (36 4 °C) (Tympanic)   Ht 5' 5" (1 651 m)   Wt 76 2 kg (168 lb)   BMI 27 96 kg/m²   Constitutional:   NAD, well appearing and well nourished      ENT:   Conjunctiva and lids: no injection, edema, or discharge     Pupils and iris: ELAINE bilaterally    External inspection of ears and nose: normal without deformities or discharge  Otoscopic exam: Canals patent; left tm dull, erythematous with an effusion  ENasal mucosa, septum and turbinates: Turbinae injection with discharge   Oropharynx:  Moist mucosa, normal tongue and tonsils without lesions  Erythema and injection  of post pharynx with pnd      Pulmonary:Respiratory effort normal rate and rhythm, no increased work of breathing   Auscultation of lungs:  Clear bilaterally with no adventitious breath sounds       Cardiovascular: regular rate and rhythm, S1 and S2, no murmur, no edema and/or varicosities of LE      Abdomen: Soft and non-distended     Positive bowel sounds      No heptomegaly or splenomegaly      Lymphatic: Anterior and posterior cervical lymphadenopathy         Muscskeletal:  Gait and station: Normal gait      Digits and nails normal without clubbing or cyanosis       Inspection/palpation of joints, bones, and muscles:  No joint tenderness, swelling, full active and passive range of motion       Gu: no suprabubic tenderness, CVA tenderness or urethral discharge  Skin: Normal skin turgor and no rashes      Neuro:    Normal cranial nerves     Normal reflexes     Normal sensation    + horizontal nystagmus with positional changes of head  Psych:   alert and oriented to person, place and time     normal mood and affect       Assessment/Plan:Diagnoses and all orders for this visit:    Vertigo  -     meclizine (ANTIVERT) 25 mg tablet; Take 1 tablet (25 mg total) by mouth every 8 (eight) hours    Acute otitis media, unspecified otitis media type  -     azithromycin (ZITHROMAX) 250 mg tablet; Take 2 tablets today then 1 tablet daily x 4 days  -     predniSONE 10 mg tablet; 3 tabs po bid x2 days, then 2 tabs po bid x2 days, then 1 tab bid x2 days, then 1 daily until done  Other orders  -     Cancel: Mammo screening bilateral w cad; Future  -     Cancel: Ambulatory referral to Colorectal Surgery; Future    Reviewed with patient plan to treat with plan as above      Patient instructed to call in 72 hours if not feeling better or if symptoms worsen

## 2018-11-27 NOTE — LETTER
November 27, 2018     Patient: Alda Check   YOB: 1955   Date of Visit: 11/27/2018       To Whom it May Concern:    Dot Luna is under my professional care  She was seen in my office on 11/27/2018  She may return to work on 11/29/18  If you have any questions or concerns, please don't hesitate to call           Sincerely,          Georgia Bryan DO        CC: No Recipients

## 2018-11-28 ENCOUNTER — TELEPHONE (OUTPATIENT)
Dept: FAMILY MEDICINE CLINIC | Facility: CLINIC | Age: 63
End: 2018-11-28

## 2018-11-28 DIAGNOSIS — J01.90 ACUTE SINUSITIS, RECURRENCE NOT SPECIFIED, UNSPECIFIED LOCATION: Primary | ICD-10-CM

## 2018-11-28 RX ORDER — CEFUROXIME AXETIL 500 MG/1
500 TABLET ORAL EVERY 12 HOURS SCHEDULED
Qty: 20 TABLET | Refills: 0 | Status: SHIPPED | OUTPATIENT
Start: 2018-11-28 | End: 2018-12-08

## 2018-12-11 DIAGNOSIS — E03.9 HYPOTHYROIDISM, UNSPECIFIED TYPE: ICD-10-CM

## 2018-12-11 RX ORDER — LEVOTHYROXINE SODIUM 100 MCG
100 TABLET ORAL DAILY
Qty: 90 TABLET | Refills: 2 | OUTPATIENT
Start: 2018-12-11 | End: 2019-03-11

## 2018-12-11 RX ORDER — LEVOTHYROXINE SODIUM 112 UG/1
112 TABLET ORAL DAILY
Qty: 30 TABLET | Refills: 0 | Status: SHIPPED | OUTPATIENT
Start: 2018-12-11 | End: 2019-01-21 | Stop reason: ALTCHOICE

## 2019-01-02 ENCOUNTER — TRANSCRIBE ORDERS (OUTPATIENT)
Dept: LAB | Facility: CLINIC | Age: 64
End: 2019-01-02

## 2019-01-02 ENCOUNTER — APPOINTMENT (OUTPATIENT)
Dept: LAB | Facility: CLINIC | Age: 64
End: 2019-01-02
Payer: COMMERCIAL

## 2019-01-02 DIAGNOSIS — E03.9 HYPOTHYROIDISM, UNSPECIFIED TYPE: ICD-10-CM

## 2019-01-02 DIAGNOSIS — E55.9 VITAMIN D DEFICIENCY: ICD-10-CM

## 2019-01-02 DIAGNOSIS — E87.6 HYPOKALEMIA: ICD-10-CM

## 2019-01-02 LAB
25(OH)D3 SERPL-MCNC: 37 NG/ML (ref 30–100)
POTASSIUM SERPL-SCNC: 3.6 MMOL/L (ref 3.5–5.3)
TSH SERPL DL<=0.05 MIU/L-ACNC: 7.08 UIU/ML (ref 0.36–3.74)

## 2019-01-02 PROCEDURE — 84443 ASSAY THYROID STIM HORMONE: CPT

## 2019-01-02 PROCEDURE — 84132 ASSAY OF SERUM POTASSIUM: CPT

## 2019-01-02 PROCEDURE — 82306 VITAMIN D 25 HYDROXY: CPT

## 2019-01-02 PROCEDURE — 36415 COLL VENOUS BLD VENIPUNCTURE: CPT

## 2019-01-03 ENCOUNTER — TELEPHONE (OUTPATIENT)
Dept: FAMILY MEDICINE CLINIC | Facility: CLINIC | Age: 64
End: 2019-01-03

## 2019-01-03 DIAGNOSIS — E03.9 HYPOTHYROIDISM, UNSPECIFIED TYPE: Primary | ICD-10-CM

## 2019-01-03 RX ORDER — LEVOTHYROXINE SODIUM 0.12 MG/1
125 TABLET ORAL DAILY
Qty: 30 TABLET | Refills: 3 | Status: SHIPPED | OUTPATIENT
Start: 2019-01-03 | End: 2019-04-23 | Stop reason: SDUPTHER

## 2019-01-21 ENCOUNTER — OFFICE VISIT (OUTPATIENT)
Dept: FAMILY MEDICINE CLINIC | Facility: CLINIC | Age: 64
End: 2019-01-21
Payer: COMMERCIAL

## 2019-01-21 VITALS
WEIGHT: 168 LBS | SYSTOLIC BLOOD PRESSURE: 124 MMHG | DIASTOLIC BLOOD PRESSURE: 80 MMHG | HEIGHT: 65 IN | BODY MASS INDEX: 27.99 KG/M2 | TEMPERATURE: 98.9 F | HEART RATE: 70 BPM

## 2019-01-21 DIAGNOSIS — J06.9 UPPER RESPIRATORY TRACT INFECTION, UNSPECIFIED TYPE: Primary | ICD-10-CM

## 2019-01-21 PROCEDURE — 1036F TOBACCO NON-USER: CPT | Performed by: FAMILY MEDICINE

## 2019-01-21 PROCEDURE — 3008F BODY MASS INDEX DOCD: CPT | Performed by: FAMILY MEDICINE

## 2019-01-21 PROCEDURE — 99213 OFFICE O/P EST LOW 20 MIN: CPT | Performed by: FAMILY MEDICINE

## 2019-01-21 RX ORDER — PREDNISONE 10 MG/1
TABLET ORAL
Qty: 26 TABLET | Refills: 0 | Status: SHIPPED | OUTPATIENT
Start: 2019-01-21 | End: 2019-04-22 | Stop reason: ALTCHOICE

## 2019-01-21 RX ORDER — CEFUROXIME AXETIL 500 MG/1
500 TABLET ORAL EVERY 12 HOURS SCHEDULED
Qty: 20 TABLET | Refills: 0 | Status: SHIPPED | OUTPATIENT
Start: 2019-01-21 | End: 2019-01-31

## 2019-01-21 RX ORDER — BROMPHENIRAMINE MALEATE, PSEUDOEPHEDRINE HYDROCHLORIDE, AND DEXTROMETHORPHAN HYDROBROMIDE 2; 30; 10 MG/5ML; MG/5ML; MG/5ML
10 SYRUP ORAL 4 TIMES DAILY PRN
Qty: 180 ML | Refills: 0 | Status: SHIPPED | OUTPATIENT
Start: 2019-01-21 | End: 2019-04-22 | Stop reason: ALTCHOICE

## 2019-01-21 NOTE — PROGRESS NOTES
Patient ID: Hugo Olsen is a 59 y o  female  HPI: 59 y  o female presenting with 2 day history of sore throat, nasal congestion, ear pain,pnd and cough  Pt denies any fever, chills, or body aches  SUBJECTIVE    Family History   Problem Relation Age of Onset    No Known Problems Mother     Diabetes Father         Mellitus    Heart disease Father     Hypertension Father      Social History     Social History    Marital status: /Civil Union     Spouse name: N/A    Number of children: N/A    Years of education: N/A     Occupational History    Not on file       Social History Main Topics    Smoking status: Never Smoker    Smokeless tobacco: Former User    Alcohol use No    Drug use: No    Sexual activity: Not on file     Other Topics Concern    Not on file     Social History Narrative    Denied: History of daily coffee consumption    Daily cola consumption         Past Medical History:   Diagnosis Date    Disease of thyroid gland     Hypertension      Past Surgical History:   Procedure Laterality Date     SECTION      ID LAP,CHOLECYSTECTOMY N/A 11/15/2017    Procedure: CHOLECYSTECTOMY LAPAROSCOPIC, umbilical hernia repair;  Surgeon: Priscila Hernandez MD;  [facility];  Service: General    TONSILLECTOMY       Allergies   Allergen Reactions    Morphine GI Intolerance     Reaction Date: 2011;     Escitalopram Rash     Reaction Date: 2011;        Current Outpatient Prescriptions:     amLODIPine (NORVASC) 5 mg tablet, 1 1/2 TAB DAILY, Disp: 135 tablet, Rfl: 3    Ascorbic Acid (VITAMIN C) 500 MG CAPS, Take by mouth, Disp: , Rfl:     Azelastine HCl 137 MCG/SPRAY SOLN, 1 SPRAY INTO EACH NOSTRIL 2 (TWO) TIMES A DAY USE IN EACH NOSTRIL AS DIRECTED, Disp: 30 mL, Rfl: 3    Cetirizine HCl (ZYRTEC ALLERGY) 10 MG CAPS, Take by mouth, Disp: , Rfl:     fluticasone (FLONASE) 50 mcg/act nasal spray, USE 2 SPRAYS IN EACH NOSTRIL ONCE DAILY, Disp: 15 8 g, Rfl: 5   levothyroxine 125 mcg tablet, Take 1 tablet (125 mcg total) by mouth daily (Patient taking differently: Take 125 mcg by mouth daily NAME BRAND ONLY ), Disp: 30 tablet, Rfl: 3    losartan (COZAAR) 100 MG tablet, Take 1 tablet (100 mg total) by mouth daily, Disp: 90 tablet, Rfl: 3    meclizine (ANTIVERT) 25 mg tablet, Take 1 tablet (25 mg total) by mouth every 8 (eight) hours, Disp: 30 tablet, Rfl: 0    potassium chloride (MICRO-K) 10 MEQ CR capsule, Take 2 capsules (20 mEq total) by mouth daily, Disp: 180 capsule, Rfl: 3    spironolactone (ALDACTONE) 25 mg tablet, Take 1 tablet (25 mg total) by mouth daily, Disp: 30 tablet, Rfl: 3    brompheniramine-pseudoephedrine-DM 30-2-10 MG/5ML syrup, Take 10 mL by mouth 4 (four) times a day as needed for congestion or cough, Disp: 180 mL, Rfl: 0    cefuroxime (CEFTIN) 500 mg tablet, Take 1 tablet (500 mg total) by mouth every 12 (twelve) hours for 10 days, Disp: 20 tablet, Rfl: 0    famotidine (PEPCID) 40 MG tablet, Take 1 tablet (40 mg total) by mouth daily for 90 days, Disp: 90 tablet, Rfl: 3    fluticasone-salmeterol (ADVAIR DISKUS) 250-50 mcg/dose inhaler, Inhale 1 puff 2 (two) times a day Rinse mouth after use , Disp: 1 Inhaler, Rfl: 1    predniSONE 10 mg tablet, 3 tabs po bid x2 days, then 2 tabs po bid x2 days, then 1 tab bid x2 days, then 1 daily until done , Disp: 26 tablet, Rfl: 0    Review of Systems  Constitutional:     Denies fever, chills ,fatigue ,weakness ,weight loss, weight gain     ENT: Denies loss of hearing ,nosebleed, nasal discharge ,hoarseness; but admits to nasal congestion , sore throat and ear pain      Pulmonary: Denies shortness of breath ,dyspnea on exertion, orthopnea ; but admits to cough and pnd  Cardiovascular:  Denies bradycardia , tachycardia  ,palpations, lower extremity edema leg, claudication  Breast:  Denies new or changing breast lumps ,nipple discharge ,nipple changes  Abdomen:  Denies abdominal pain , anorexia , indigestion, nausea, vomiting, constipation, diarrhea  Musculoskeletal: Denies myalgias, arthralgias, joint swelling, joint stiffness , limb pain, limb swelling  Lymph: + swollen glands  Gu: Denies polyuria or dysuria  Skin: Denies skin rash, skin lesion, skin wound, itching, dry skin  Neuro: Denies headache, numbness, tingling, confusion, loss of consciousness, dizziness, vertigo  Psychiatric: Denies feelings of depression, suicidal ideation, anxiety, sleep disturbances    OBJECTIVE  /80   Pulse 70   Temp 98 9 °F (37 2 °C)   Ht 5' 5" (1 651 m)   Wt 76 2 kg (168 lb)   BMI 27 96 kg/m²   Constitutional:   NAD, well appearing and well nourished     ENT:   Conjunctiva and lids: no injection, edema, or discharge    Pupils and iris: ELAINE bilaterally  External inspection of ears and nose: normal without deformities or discharge  Otoscopic exam: Canals patent without erythema, tm dull and erythematous, effusions bilaterally   Nasal mucosa, septum and turbinates: + turbinate injection, nasal discharge         Oropharynx:  Moist mucosa, normal tongue and tonsils without lesions  + erythema and injection of posterior pharynx with pnd    Pulmonary:Respiratory effort normal rate and rhythm, no increased work of breathing   Auscultation of lungs:  Clear bilaterally with no adventitious breath sounds     Cardiovascular: regular rate and rhythm, S1 and S2, no murmur, no edema and/or varicosities of LE     Abdomen: Soft and nontender with + bowel sounds  No heptomegaly or splenomegaly     Gu: no suprapubic tenderness or CVA tenderness  Lymphatic: + anterior  cervical lymphadenopathy      Musculoskeletal:  Gait and station: Normal gait      Digits and nails normal without clubbing or cyanosis      Inspection/palpation of joints, bones, and muscles:  No joint tenderness, swelling, full active and passive range of motion       Skin: Normal skin turgor and no rashes      Neuro:    Normal reflexes  Pych:   alert and oriented to person, place and time     normal mood and affect      Assessment/Plan:Diagnoses and all orders for this visit:    Upper respiratory tract infection, unspecified type  -     fluticasone-salmeterol (ADVAIR DISKUS) 250-50 mcg/dose inhaler; Inhale 1 puff 2 (two) times a day Rinse mouth after use  -     predniSONE 10 mg tablet; 3 tabs po bid x2 days, then 2 tabs po bid x2 days, then 1 tab bid x2 days, then 1 daily until done  -     brompheniramine-pseudoephedrine-DM 30-2-10 MG/5ML syrup; Take 10 mL by mouth 4 (four) times a day as needed for congestion or cough  -     cefuroxime (CEFTIN) 500 mg tablet; Take 1 tablet (500 mg total) by mouth every 12 (twelve) hours for 10 days        Reviewed with patient plan to treat with above plan      Patient instructed to call in 72 hours if not feeling better or if symptoms worsen

## 2019-01-23 DIAGNOSIS — J06.9 UPPER RESPIRATORY TRACT INFECTION, UNSPECIFIED TYPE: Primary | ICD-10-CM

## 2019-01-23 RX ORDER — HYDROCODONE POLISTIREX AND CHLORPHENIRAMINE POLISTIREX 10; 8 MG/5ML; MG/5ML
5 SUSPENSION, EXTENDED RELEASE ORAL EVERY 12 HOURS PRN
Qty: 120 ML | Refills: 0 | Status: SHIPPED | OUTPATIENT
Start: 2019-01-23 | End: 2019-04-22 | Stop reason: ALTCHOICE

## 2019-01-24 ENCOUNTER — TELEPHONE (OUTPATIENT)
Dept: FAMILY MEDICINE CLINIC | Facility: CLINIC | Age: 64
End: 2019-01-24

## 2019-01-24 DIAGNOSIS — J06.9 UPPER RESPIRATORY TRACT INFECTION, UNSPECIFIED TYPE: Primary | ICD-10-CM

## 2019-01-24 RX ORDER — DOXYCYCLINE HYCLATE 100 MG/1
100 CAPSULE ORAL EVERY 12 HOURS SCHEDULED
Qty: 20 CAPSULE | Refills: 0 | Status: SHIPPED | OUTPATIENT
Start: 2019-01-24 | End: 2019-02-03

## 2019-01-24 NOTE — TELEPHONE ENCOUNTER
She was to call back to let you know how fever is  On antibiotic since Mondays visit  Today fever is 100 8   It has come down, but still there  You mentioned changing her antibiotic  See allergy list   [facility] [place]  Also, she still has cough, she was up all night  She is still wheezing, she is very sensitive to meds  She tried Bexar Pean she had in house and it worked so if you could order Bexar Cat also  Will ck pharmacy later

## 2019-02-13 DIAGNOSIS — J06.9 UPPER RESPIRATORY TRACT INFECTION, UNSPECIFIED TYPE: ICD-10-CM

## 2019-03-06 DIAGNOSIS — R60.9 EDEMA, UNSPECIFIED TYPE: ICD-10-CM

## 2019-03-06 RX ORDER — POTASSIUM CHLORIDE 750 MG/1
20 CAPSULE, EXTENDED RELEASE ORAL DAILY
Qty: 180 CAPSULE | Refills: 0 | Status: SHIPPED | OUTPATIENT
Start: 2019-03-06 | End: 2019-05-21 | Stop reason: SDUPTHER

## 2019-03-22 DIAGNOSIS — J06.9 UPPER RESPIRATORY TRACT INFECTION, UNSPECIFIED TYPE: ICD-10-CM

## 2019-03-22 RX ORDER — MOMETASONE FUROATE AND FORMOTEROL FUMARATE DIHYDRATE 200; 5 UG/1; UG/1
AEROSOL RESPIRATORY (INHALATION)
Qty: 13 INHALER | Refills: 1 | Status: SHIPPED | OUTPATIENT
Start: 2019-03-22 | End: 2019-07-09 | Stop reason: ALTCHOICE

## 2019-03-23 ENCOUNTER — TRANSCRIBE ORDERS (OUTPATIENT)
Dept: LAB | Facility: CLINIC | Age: 64
End: 2019-03-23

## 2019-03-23 ENCOUNTER — APPOINTMENT (OUTPATIENT)
Dept: LAB | Facility: CLINIC | Age: 64
End: 2019-03-23
Payer: COMMERCIAL

## 2019-03-23 DIAGNOSIS — E03.9 HYPOTHYROIDISM, UNSPECIFIED TYPE: Primary | ICD-10-CM

## 2019-03-23 DIAGNOSIS — E03.9 HYPOTHYROIDISM, UNSPECIFIED TYPE: ICD-10-CM

## 2019-03-23 LAB — TSH SERPL DL<=0.05 MIU/L-ACNC: 0.19 UIU/ML (ref 0.36–3.74)

## 2019-03-23 PROCEDURE — 36415 COLL VENOUS BLD VENIPUNCTURE: CPT

## 2019-03-23 PROCEDURE — 84443 ASSAY THYROID STIM HORMONE: CPT

## 2019-03-26 DIAGNOSIS — E03.8 OTHER SPECIFIED HYPOTHYROIDISM: Primary | ICD-10-CM

## 2019-04-22 ENCOUNTER — OFFICE VISIT (OUTPATIENT)
Dept: FAMILY MEDICINE CLINIC | Facility: CLINIC | Age: 64
End: 2019-04-22
Payer: COMMERCIAL

## 2019-04-22 VITALS
HEART RATE: 72 BPM | HEIGHT: 65 IN | WEIGHT: 167 LBS | SYSTOLIC BLOOD PRESSURE: 124 MMHG | DIASTOLIC BLOOD PRESSURE: 82 MMHG | BODY MASS INDEX: 27.82 KG/M2 | TEMPERATURE: 98.6 F

## 2019-04-22 DIAGNOSIS — I10 ESSENTIAL HYPERTENSION: ICD-10-CM

## 2019-04-22 DIAGNOSIS — E03.9 HYPOTHYROIDISM, UNSPECIFIED TYPE: Primary | ICD-10-CM

## 2019-04-22 PROCEDURE — 99213 OFFICE O/P EST LOW 20 MIN: CPT | Performed by: FAMILY MEDICINE

## 2019-04-23 DIAGNOSIS — E03.9 HYPOTHYROIDISM, UNSPECIFIED TYPE: ICD-10-CM

## 2019-04-23 RX ORDER — LEVOTHYROXINE SODIUM 125 UG/1
TABLET ORAL
Qty: 30 TABLET | Refills: 2 | Status: SHIPPED | OUTPATIENT
Start: 2019-04-23 | End: 2019-06-13 | Stop reason: DRUGHIGH

## 2019-05-20 ENCOUNTER — TELEPHONE (OUTPATIENT)
Dept: FAMILY MEDICINE CLINIC | Facility: CLINIC | Age: 64
End: 2019-05-20

## 2019-05-21 ENCOUNTER — OFFICE VISIT (OUTPATIENT)
Dept: FAMILY MEDICINE CLINIC | Facility: CLINIC | Age: 64
End: 2019-05-21
Payer: COMMERCIAL

## 2019-05-21 VITALS
SYSTOLIC BLOOD PRESSURE: 126 MMHG | HEIGHT: 65 IN | WEIGHT: 167.2 LBS | BODY MASS INDEX: 27.86 KG/M2 | HEART RATE: 72 BPM | TEMPERATURE: 98.1 F | DIASTOLIC BLOOD PRESSURE: 84 MMHG

## 2019-05-21 DIAGNOSIS — R60.9 EDEMA, UNSPECIFIED TYPE: ICD-10-CM

## 2019-05-21 DIAGNOSIS — J01.90 ACUTE SINUSITIS, RECURRENCE NOT SPECIFIED, UNSPECIFIED LOCATION: Primary | ICD-10-CM

## 2019-05-21 DIAGNOSIS — K21.9 GASTROESOPHAGEAL REFLUX DISEASE WITHOUT ESOPHAGITIS: ICD-10-CM

## 2019-05-21 DIAGNOSIS — I10 ESSENTIAL HYPERTENSION: ICD-10-CM

## 2019-05-21 PROCEDURE — 1036F TOBACCO NON-USER: CPT | Performed by: FAMILY MEDICINE

## 2019-05-21 PROCEDURE — 99213 OFFICE O/P EST LOW 20 MIN: CPT | Performed by: FAMILY MEDICINE

## 2019-05-21 PROCEDURE — 3008F BODY MASS INDEX DOCD: CPT | Performed by: FAMILY MEDICINE

## 2019-05-21 RX ORDER — CEFUROXIME AXETIL 500 MG/1
500 TABLET ORAL EVERY 12 HOURS SCHEDULED
Qty: 20 TABLET | Refills: 0 | Status: SHIPPED | OUTPATIENT
Start: 2019-05-21 | End: 2019-05-31

## 2019-05-21 RX ORDER — POTASSIUM CHLORIDE 750 MG/1
20 CAPSULE, EXTENDED RELEASE ORAL DAILY
Qty: 180 CAPSULE | Refills: 0 | Status: SHIPPED | OUTPATIENT
Start: 2019-05-21 | End: 2019-10-10 | Stop reason: SDUPTHER

## 2019-05-21 RX ORDER — LOSARTAN POTASSIUM 100 MG/1
100 TABLET ORAL DAILY
Qty: 90 TABLET | Refills: 3 | Status: SHIPPED | OUTPATIENT
Start: 2019-05-21 | End: 2019-06-20 | Stop reason: ALTCHOICE

## 2019-05-21 RX ORDER — FAMOTIDINE 40 MG/1
40 TABLET, FILM COATED ORAL DAILY
Qty: 90 TABLET | Refills: 3 | Status: SHIPPED | OUTPATIENT
Start: 2019-05-21 | End: 2020-05-29

## 2019-06-07 ENCOUNTER — TELEPHONE (OUTPATIENT)
Dept: FAMILY MEDICINE CLINIC | Facility: CLINIC | Age: 64
End: 2019-06-07

## 2019-06-07 DIAGNOSIS — I10 ESSENTIAL HYPERTENSION: Primary | ICD-10-CM

## 2019-06-07 RX ORDER — LISINOPRIL 20 MG/1
20 TABLET ORAL DAILY
Qty: 30 TABLET | Refills: 3 | Status: SHIPPED | OUTPATIENT
Start: 2019-06-07 | End: 2019-07-09 | Stop reason: CLARIF

## 2019-06-10 DIAGNOSIS — I10 ESSENTIAL HYPERTENSION: ICD-10-CM

## 2019-06-10 RX ORDER — AMLODIPINE BESYLATE 5 MG/1
TABLET ORAL
Qty: 135 TABLET | Refills: 3 | Status: SHIPPED | OUTPATIENT
Start: 2019-06-10 | End: 2019-08-06 | Stop reason: SDUPTHER

## 2019-06-13 ENCOUNTER — APPOINTMENT (OUTPATIENT)
Dept: LAB | Facility: CLINIC | Age: 64
End: 2019-06-13
Payer: COMMERCIAL

## 2019-06-13 ENCOUNTER — TRANSCRIBE ORDERS (OUTPATIENT)
Dept: LAB | Facility: CLINIC | Age: 64
End: 2019-06-13

## 2019-06-13 DIAGNOSIS — E03.9 HYPOTHYROIDISM, UNSPECIFIED TYPE: ICD-10-CM

## 2019-06-13 DIAGNOSIS — E03.9 ACQUIRED HYPOTHYROIDISM: Primary | ICD-10-CM

## 2019-06-13 LAB — TSH SERPL DL<=0.05 MIU/L-ACNC: 0.07 UIU/ML (ref 0.36–3.74)

## 2019-06-13 PROCEDURE — 36415 COLL VENOUS BLD VENIPUNCTURE: CPT

## 2019-06-13 PROCEDURE — 84443 ASSAY THYROID STIM HORMONE: CPT

## 2019-06-13 RX ORDER — LEVOTHYROXINE SODIUM 112 UG/1
112 TABLET ORAL
Qty: 90 TABLET | Refills: 3 | Status: SHIPPED | OUTPATIENT
Start: 2019-06-13 | End: 2019-07-09 | Stop reason: DRUGHIGH

## 2019-06-20 ENCOUNTER — CLINICAL SUPPORT (OUTPATIENT)
Dept: FAMILY MEDICINE CLINIC | Facility: CLINIC | Age: 64
End: 2019-06-20
Payer: COMMERCIAL

## 2019-06-20 VITALS
SYSTOLIC BLOOD PRESSURE: 134 MMHG | HEART RATE: 74 BPM | DIASTOLIC BLOOD PRESSURE: 86 MMHG | WEIGHT: 166.8 LBS | BODY MASS INDEX: 27.79 KG/M2 | HEIGHT: 65 IN | TEMPERATURE: 98.9 F

## 2019-06-20 DIAGNOSIS — I10 ESSENTIAL HYPERTENSION: Primary | ICD-10-CM

## 2019-06-20 PROCEDURE — 99211 OFF/OP EST MAY X REQ PHY/QHP: CPT | Performed by: FAMILY MEDICINE

## 2019-06-25 DIAGNOSIS — I10 ESSENTIAL HYPERTENSION: Primary | ICD-10-CM

## 2019-06-25 RX ORDER — LOSARTAN POTASSIUM 100 MG/1
TABLET ORAL
Qty: 90 TABLET | Refills: 0 | OUTPATIENT
Start: 2019-06-25

## 2019-07-01 ENCOUNTER — TELEPHONE (OUTPATIENT)
Dept: FAMILY MEDICINE CLINIC | Facility: CLINIC | Age: 64
End: 2019-07-01

## 2019-07-01 DIAGNOSIS — L25.8 CONTACT DERMATITIS DUE TO OTHER AGENT, UNSPECIFIED CONTACT DERMATITIS TYPE: Primary | ICD-10-CM

## 2019-07-01 RX ORDER — PREDNISONE 10 MG/1
TABLET ORAL
Qty: 26 TABLET | Refills: 0 | Status: SHIPPED | OUTPATIENT
Start: 2019-07-01 | End: 2019-07-09 | Stop reason: ALTCHOICE

## 2019-07-01 NOTE — TELEPHONE ENCOUNTER
----- Message from Bety Rubio sent at 7/1/2019 10:13 AM EDT -----  Regarding: FW: Prescription Question  Contact: [phone]      ----- Message -----  From: Martin Villanueva  Sent: 7/1/2019  10:09 AM EDT  To: [facility]  Subject: Prescription Question                            Good Morning! I am in [facility] right now and have developed a rash, red, non-raised but VERY itchy  I was itchy a few days before I couldn't stand it    this past Saturday when I went to bed the itching was unbearable  I applied hydrocortisone which did not help much  After a while I had Sheila apply Aveeno which almost stopped the itch  I tried to think what could be causing this    [place]anning [place] I am not aware of being bit by an insect and have not been in the sun for sunburn  The rash is only on my left side radiating to part of my left side of back  It feels very dry like tough leather (gross feeling)  I then thought that you changed my BP med to Lisinopril around 3 weeks ago  I was itchy before Saturday but am often itchy at night so if this started before Saturday due to whatever is causing this I dismissed it  I did not take my Lisinopril yesterday and did take my Amlodipine last night  Before taking the night time pill my BP was 144/77  I have not taken Lisinopril this morning  BP  130/68  Please let me know if you think the rash is from the Lisinopril --have taken it for 3 weeks before major rash and puritis  Thanks so much  Artis Alpers     If you would like me to send a picture, please send me a phone number I can send to    Channing [place] I tried attaching here but would not let me

## 2019-07-01 NOTE — TELEPHONE ENCOUNTER
Pt aware ,she does not believe it's shingle's ,she is using the Aveeno Cr helping some and taking zyrtec daily , she would like the steroid sent to Pharm ,she will get the name and # of the Pharm in [facility] [place]

## 2019-07-01 NOTE — TELEPHONE ENCOUNTER
Pt called back and she wants the script sent to [facility] [address]  [address] , Phone [phone]

## 2019-07-03 DIAGNOSIS — Z12.39 SCREENING FOR BREAST CANCER: Primary | ICD-10-CM

## 2019-07-09 ENCOUNTER — OFFICE VISIT (OUTPATIENT)
Dept: FAMILY MEDICINE CLINIC | Facility: CLINIC | Age: 64
End: 2019-07-09
Payer: COMMERCIAL

## 2019-07-09 VITALS
HEIGHT: 65 IN | DIASTOLIC BLOOD PRESSURE: 72 MMHG | WEIGHT: 167 LBS | HEART RATE: 77 BPM | TEMPERATURE: 98.7 F | OXYGEN SATURATION: 97 % | SYSTOLIC BLOOD PRESSURE: 120 MMHG | BODY MASS INDEX: 27.82 KG/M2

## 2019-07-09 DIAGNOSIS — I10 ESSENTIAL HYPERTENSION: Primary | ICD-10-CM

## 2019-07-09 DIAGNOSIS — E03.9 HYPOTHYROIDISM, UNSPECIFIED TYPE: ICD-10-CM

## 2019-07-09 PROCEDURE — 3008F BODY MASS INDEX DOCD: CPT | Performed by: FAMILY MEDICINE

## 2019-07-09 PROCEDURE — 3074F SYST BP LT 130 MM HG: CPT | Performed by: FAMILY MEDICINE

## 2019-07-09 PROCEDURE — 1036F TOBACCO NON-USER: CPT | Performed by: FAMILY MEDICINE

## 2019-07-09 PROCEDURE — 99213 OFFICE O/P EST LOW 20 MIN: CPT | Performed by: FAMILY MEDICINE

## 2019-07-09 PROCEDURE — 3078F DIAST BP <80 MM HG: CPT | Performed by: FAMILY MEDICINE

## 2019-07-09 RX ORDER — LOSARTAN POTASSIUM 100 MG/1
100 TABLET ORAL DAILY
Qty: 90 TABLET | Refills: 5 | Status: SHIPPED | OUTPATIENT
Start: 2019-07-09 | End: 2020-07-23 | Stop reason: ALTCHOICE

## 2019-07-09 RX ORDER — LEVOTHYROXINE SODIUM 0.12 MG/1
125 TABLET ORAL
Qty: 30 TABLET | Refills: 5 | Status: SHIPPED | OUTPATIENT
Start: 2019-07-09 | End: 2019-07-10

## 2019-07-09 NOTE — PROGRESS NOTES
BMI Counseling: Body mass index is 27 79 kg/m²  Discussed the patient's BMI with her  The BMI is above average  BMI counseling and education was provided to the patient  Nutrition recommendations include reducing portion sizes and decreasing overall calorie intake  Patient ID: Feroz Brown is a 59 y o  female  HPI: 59 y  o female presents complaining of an ACE-induced cough and wants to go back on an ARB  Sje sudhir beem pm Cozaar in past and done well with it  She is also due to have her tsh checked soon       SUBJECTIVE    Family History   Problem Relation Age of Onset    No Known Problems Mother     Diabetes Father         Mellitus    Heart disease Father     Hypertension Father      Social History     Socioeconomic History    Marital status: /Civil Union     Spouse name: Not on file    Number of children: Not on file    Years of education: Not on file    Highest education level: Not on file   Occupational History    Not on file   Social Needs    Financial resource strain: Not on file    Food insecurity:     Worry: Not on file     Inability: Not on file    Transportation needs:     Medical: Not on file     Non-medical: Not on file   Tobacco Use    Smoking status: Never Smoker    Smokeless tobacco: Former User   Substance and Sexual Activity    Alcohol use: No    Drug use: No    Sexual activity: Not on file   Lifestyle    Physical activity:     Days per week: Not on file     Minutes per session: Not on file    Stress: Not on file   Relationships    Social connections:     Talks on phone: Not on file     Gets together: Not on file     Attends [employer] service: Not on file     Active member of club or organization: Not on file     Attends meetings of clubs or organizations: Not on file     Relationship status: Not on file    Intimate partner violence:     Fear of current or ex partner: Not on file     Emotionally abused: Not on file     Physically abused: Not on file     Forced sexual activity: Not on file   Other Topics Concern    Not on file   Social History Narrative    Denied: History of daily coffee consumption    Daily cola consumption     Past Medical History:   Diagnosis Date    Disease of thyroid gland     Hypertension      Past Surgical History:   Procedure Laterality Date     SECTION      CA LAP,CHOLECYSTECTOMY N/A 11/15/2017    Procedure: CHOLECYSTECTOMY LAPAROSCOPIC, umbilical hernia repair;  Surgeon: Brenda Vieyra MD;  [facility];  Service: General    TONSILLECTOMY       Allergies   Allergen Reactions    Morphine GI Intolerance     Reaction Date: 2011;     Escitalopram Rash     Reaction Date: 2011;        Current Outpatient Medications:     amLODIPine (NORVASC) 5 mg tablet, 1 1/2 TAB DAILY, Disp: 135 tablet, Rfl: 3    Ascorbic Acid (VITAMIN C) 500 MG CAPS, Take by mouth, Disp: , Rfl:     Azelastine HCl 137 MCG/SPRAY SOLN, 1 SPRAY INTO EACH NOSTRIL 2 (TWO) TIMES A DAY USE IN EACH NOSTRIL AS DIRECTED, Disp: 30 mL, Rfl: 3    Cetirizine HCl (ZYRTEC ALLERGY) 10 MG CAPS, Take by mouth, Disp: , Rfl:     famotidine (PEPCID) 40 MG tablet, Take 1 tablet (40 mg total) by mouth daily, Disp: 90 tablet, Rfl: 3    fluticasone (FLONASE) 50 mcg/act nasal spray, USE 2 SPRAYS IN EACH NOSTRIL ONCE DAILY, Disp: 15 8 g, Rfl: 5    fluticasone-salmeterol (ADVAIR DISKUS) 250-50 mcg/dose inhaler, Inhale 1 puff 2 (two) times a day Rinse mouth after use , Disp: 3 Inhaler, Rfl: 1    potassium chloride (MICRO-K) 10 MEQ CR capsule, Take 2 capsules (20 mEq total) by mouth daily, Disp: 180 capsule, Rfl: 0    spironolactone (ALDACTONE) 25 mg tablet, Take 1 tablet (25 mg total) by mouth daily, Disp: 30 tablet, Rfl: 3    levothyroxine 125 mcg tablet, Take 1 tablet (125 mcg total) by mouth daily in the early morning, Disp: 30 tablet, Rfl: 5    losartan (COZAAR) 100 MG tablet, Take 1 tablet (100 mg total) by mouth daily for 90 days, Disp: 90 tablet, Rfl: 5    Review of Systems  Constitutional:     Denies fever, chills ,fatigue ,weakness ,weight loss, weight gain     ENT: Denies earache ,loss of hearing ,nosebleed, nasal discharge,nasal congestion ,sore throat ,hoarseness  Pulmonary: Denies shortness of breath   ,dyspnea on exertion, orthopnea  ,PND + dry cough  Cardiovascular:  Denies bradycardia , tachycardia  ,palpations, lower extremity edema leg, claudication  Breast:  Denies new or changing breast lumps ,nipple discharge ,nipple changes  Abdomen:  Denies abdominal pain , anorexia , indigestion, nausea, vomiting, constipation, diarrhea  Musculoskeletal: Denies myalgias, arthralgias, joint swelling, joint stiffness , limb pain, limb swelling  Gu: denies dysuria, polyuria  Skin: Denies skin rash, skin lesion, skin wound, itching, dry skin  Neuro: Denies headache, numbness, tingling, confusion, loss of consciousness, dizziness, vertigo  Psychiatric: Denies feelings of depression, suicidal ideation, anxiety, sleep disturbances    OBJECTIVE  /72   Pulse 77   Temp 98 7 °F (37 1 °C)   Ht 5' 5" (1 651 m)   Wt 75 8 kg (167 lb)   SpO2 97%   BMI 27 79 kg/m²   Constitutional:   NAD, well appearing and well nourished      ENT:   Conjunctiva and lids: no injection, edema, or discharge     Pupils and iris: ELAINE bilaterally    External inspection of ears and nose: normal without deformities or discharge  Otoscopic exam: Canals patent without erythema  Nasal mucosa, septum and turbinates: Normal or edema or discharge         Oropharynx:  Moist mucosa, normal tongue and tonsils without lesions  No erythema        Pulmonary:Respiratory effort normal rate and rhythm, no increased work of breathing   Auscultation of lungs:  Clear bilaterally with no adventitious breath sounds       Cardiovascular: regular rate and rhythm, S1 and S2, no murmur, no edema and/or varicosities of LE      Abdomen: Soft and non-distended     Positive bowel sounds      No heptomegaly or splenomegaly      Gu: no suprapubic tenderness or CVA tenderness, no urethral discharge  Lymphatic:  No anterior or posterior cervical lymphadenopathy         Musculoskeletal:  Gait and station: Normal gait      Digits and nails normal without clubbing or cyanosis       Inspection/palpation of joints, bones, and muscles:  No joint tenderness, swelling, full active and passive range of motion       Skin: Normal skin turgor and no rashes      Neuro:    Normal reflexes     Psych:   alert and oriented to person, place and time     normal mood and affect       Assessment/Plan:Diagnoses and all orders for this visit:    Essential hypertension  -     losartan (COZAAR) 100 MG tablet; Take 1 tablet (100 mg total) by mouth daily for 90 days    Hypothyroidism, unspecified type  -     levothyroxine 125 mcg tablet; Take 1 tablet (125 mcg total) by mouth daily in the early morning  -     TSH, 3rd generation; Future        Reviewed with patient plan to treat with plan as above  Pt to call in 2 weeks with bp results       Patient instructed to call in 72 hours if not feeling better or if symptoms worsen

## 2019-07-10 ENCOUNTER — TELEPHONE (OUTPATIENT)
Dept: FAMILY MEDICINE CLINIC | Facility: CLINIC | Age: 64
End: 2019-07-10

## 2019-07-10 DIAGNOSIS — E03.9 HYPOTHYROIDISM, UNSPECIFIED TYPE: ICD-10-CM

## 2019-07-10 RX ORDER — LEVOTHYROXINE SODIUM 0.12 MG/1
125 TABLET ORAL
Qty: 30 TABLET | Refills: 5 | Status: SHIPPED | OUTPATIENT
Start: 2019-07-10 | End: 2020-02-10

## 2019-07-10 NOTE — TELEPHONE ENCOUNTER
You sent Levothyroxine to pharm  She needs Brand name synthroid,  Can it be [facility] to [facility]ap

## 2019-07-27 ENCOUNTER — APPOINTMENT (OUTPATIENT)
Dept: LAB | Facility: CLINIC | Age: 64
End: 2019-07-27
Payer: COMMERCIAL

## 2019-07-27 ENCOUNTER — TRANSCRIBE ORDERS (OUTPATIENT)
Dept: LAB | Facility: CLINIC | Age: 64
End: 2019-07-27

## 2019-07-27 DIAGNOSIS — E03.9 HYPOTHYROIDISM, UNSPECIFIED TYPE: ICD-10-CM

## 2019-07-27 LAB — TSH SERPL DL<=0.05 MIU/L-ACNC: 0.38 UIU/ML (ref 0.36–3.74)

## 2019-07-27 PROCEDURE — 84443 ASSAY THYROID STIM HORMONE: CPT

## 2019-07-27 PROCEDURE — 36415 COLL VENOUS BLD VENIPUNCTURE: CPT

## 2019-08-06 DIAGNOSIS — I10 ESSENTIAL HYPERTENSION: ICD-10-CM

## 2019-08-06 RX ORDER — AMLODIPINE BESYLATE 5 MG/1
TABLET ORAL
Qty: 45 TABLET | Refills: 0 | Status: SHIPPED | OUTPATIENT
Start: 2019-08-06 | End: 2020-03-25

## 2019-08-27 DIAGNOSIS — J06.9 UPPER RESPIRATORY TRACT INFECTION, UNSPECIFIED TYPE: ICD-10-CM

## 2019-08-28 DIAGNOSIS — I10 ESSENTIAL HYPERTENSION: ICD-10-CM

## 2019-08-28 RX ORDER — LISINOPRIL 20 MG/1
TABLET ORAL
Qty: 90 TABLET | Refills: 1 | Status: SHIPPED | OUTPATIENT
Start: 2019-08-28 | End: 2020-02-10

## 2019-10-10 DIAGNOSIS — R60.9 EDEMA, UNSPECIFIED TYPE: ICD-10-CM

## 2019-10-10 RX ORDER — POTASSIUM CHLORIDE 750 MG/1
20 CAPSULE, EXTENDED RELEASE ORAL DAILY
Qty: 180 CAPSULE | Refills: 0 | Status: SHIPPED | OUTPATIENT
Start: 2019-10-10 | End: 2019-10-22 | Stop reason: SDUPTHER

## 2019-10-22 ENCOUNTER — OFFICE VISIT (OUTPATIENT)
Dept: FAMILY MEDICINE CLINIC | Facility: CLINIC | Age: 64
End: 2019-10-22
Payer: COMMERCIAL

## 2019-10-22 VITALS
BODY MASS INDEX: 27.99 KG/M2 | HEIGHT: 65 IN | DIASTOLIC BLOOD PRESSURE: 80 MMHG | HEART RATE: 76 BPM | SYSTOLIC BLOOD PRESSURE: 120 MMHG | WEIGHT: 168 LBS | TEMPERATURE: 98.2 F

## 2019-10-22 DIAGNOSIS — E03.9 HYPOTHYROIDISM, UNSPECIFIED TYPE: ICD-10-CM

## 2019-10-22 DIAGNOSIS — E78.00 HYPERCHOLESTEROLEMIA: ICD-10-CM

## 2019-10-22 DIAGNOSIS — Z00.00 HEALTHCARE MAINTENANCE: Primary | ICD-10-CM

## 2019-10-22 DIAGNOSIS — R60.9 EDEMA, UNSPECIFIED TYPE: ICD-10-CM

## 2019-10-22 DIAGNOSIS — J30.9 ALLERGIC RHINITIS, UNSPECIFIED SEASONALITY, UNSPECIFIED TRIGGER: ICD-10-CM

## 2019-10-22 DIAGNOSIS — E55.9 VITAMIN D DEFICIENCY: ICD-10-CM

## 2019-10-22 DIAGNOSIS — I10 ESSENTIAL HYPERTENSION: ICD-10-CM

## 2019-10-22 DIAGNOSIS — Z23 NEED FOR VACCINATION: ICD-10-CM

## 2019-10-22 PROCEDURE — 90662 IIV NO PRSV INCREASED AG IM: CPT | Performed by: FAMILY MEDICINE

## 2019-10-22 PROCEDURE — 90471 IMMUNIZATION ADMIN: CPT | Performed by: FAMILY MEDICINE

## 2019-10-22 PROCEDURE — 99396 PREV VISIT EST AGE 40-64: CPT | Performed by: FAMILY MEDICINE

## 2019-10-22 RX ORDER — SPIRONOLACTONE 25 MG/1
25 TABLET ORAL DAILY
Qty: 30 TABLET | Refills: 3 | Status: SHIPPED | OUTPATIENT
Start: 2019-10-22 | End: 2020-04-07

## 2019-10-22 RX ORDER — FLUTICASONE PROPIONATE 50 MCG
2 SPRAY, SUSPENSION (ML) NASAL DAILY
Qty: 15.8 G | Refills: 5 | Status: SHIPPED | OUTPATIENT
Start: 2019-10-22 | End: 2022-06-06 | Stop reason: ALTCHOICE

## 2019-10-22 RX ORDER — POTASSIUM CHLORIDE 750 MG/1
20 CAPSULE, EXTENDED RELEASE ORAL DAILY
Qty: 180 CAPSULE | Refills: 0 | Status: SHIPPED | OUTPATIENT
Start: 2019-10-22 | End: 2020-04-10 | Stop reason: SDUPTHER

## 2019-10-22 NOTE — PROGRESS NOTES
Patient ID: Sam Jeffrey is a 62 y o  female  HPI: 62 y  o female presents for a health maintenance visit  She is due for labs in January and would like a flu shot today    She is up to date with colon screening and mammogram      SUBJECTIVE    Family History   Problem Relation Age of Onset    No Known Problems Mother     Diabetes Father         Mellitus    Heart disease Father     Hypertension Father      Social History     Socioeconomic History    Marital status: /Civil Union     Spouse name: Not on file    Number of children: Not on file    Years of education: Not on file    Highest education level: Not on file   Occupational History    Not on file   Social Needs    Financial resource strain: Not on file    Food insecurity:     Worry: Not on file     Inability: Not on file    Transportation needs:     Medical: Not on file     Non-medical: Not on file   Tobacco Use    Smoking status: Never Smoker    Smokeless tobacco: Former User   Substance and Sexual Activity    Alcohol use: No    Drug use: No    Sexual activity: Not on file   Lifestyle    Physical activity:     Days per week: Not on file     Minutes per session: Not on file    Stress: Not on file   Relationships    Social connections:     Talks on phone: Not on file     Gets together: Not on file     Attends Orthodoxy service: Not on file     Active member of club or organization: Not on file     Attends meetings of clubs or organizations: Not on file     Relationship status: Not on file    Intimate partner violence:     Fear of current or ex partner: Not on file     Emotionally abused: Not on file     Physically abused: Not on file     Forced sexual activity: Not on file   Other Topics Concern    Not on file   Social History Narrative    Denied: History of daily coffee consumption    Daily cola consumption     Past Medical History:   Diagnosis Date    Disease of thyroid gland     Hypertension      Past Surgical History: Procedure Laterality Date     SECTION      [place] LAP,CHOLECYSTECTOMY N/A 11/15/2017    Procedure: CHOLECYSTECTOMY LAPAROSCOPIC, umbilical hernia repair;  Surgeon: Azael Deal MD;  Location: [facility];  Service: General    TONSILLECTOMY       Allergies   Allergen Reactions    Morphine GI Intolerance     Reaction Date: 2011;     Escitalopram Rash     Reaction Date: 2011;        Current Outpatient Medications:     ADVAIR DISKUS 250-50 MCG/DOSE inhaler, INHALE 1 PUFF 2 (TWO) TIMES A DAY RINSE MOUTH AFTER USE , Disp: 1 Inhaler, Rfl: 1    amLODIPine (NORVASC) 5 mg tablet, 1 1/2 TAB DAILY, Disp: 45 tablet, Rfl: 0    Ascorbic Acid (VITAMIN C) 500 MG CAPS, Take by mouth, Disp: , Rfl:     Azelastine HCl 137 MCG/SPRAY SOLN, 1 SPRAY INTO EACH NOSTRIL 2 (TWO) TIMES A DAY USE IN EACH NOSTRIL AS DIRECTED, Disp: 30 mL, Rfl: 3    Cetirizine HCl (ZYRTEC ALLERGY) 10 MG CAPS, Take by mouth, Disp: , Rfl:     famotidine (PEPCID) 40 MG tablet, Take 1 tablet (40 mg total) by mouth daily, Disp: 90 tablet, Rfl: 3    fluticasone (FLONASE) 50 mcg/act nasal spray, 2 sprays into each nostril daily, Disp: 15 8 g, Rfl: 5    levothyroxine 125 mcg tablet, Take 1 tablet (125 mcg total) by mouth daily in the early morning, Disp: 30 tablet, Rfl: 5    lisinopril (ZESTRIL) 20 mg tablet, TAKE 1 TABLET BY MOUTH EVERY DAY, Disp: 90 tablet, Rfl: 1    losartan (COZAAR) 100 MG tablet, Take 1 tablet (100 mg total) by mouth daily for 90 days, Disp: 90 tablet, Rfl: 5    potassium chloride (MICRO-K) 10 MEQ CR capsule, Take 2 capsules (20 mEq total) by mouth daily, Disp: 180 capsule, Rfl: 0    spironolactone (ALDACTONE) 25 mg tablet, Take 1 tablet (25 mg total) by mouth daily, Disp: 30 tablet, Rfl: 3    Review of Systems  Constitutional:     Denies fever, chills ,fatigue ,weakness ,weight loss, weight gain     ENT: Denies earache ,loss of hearing ,nosebleed, nasal discharge,nasal congestion ,sore throat ,hoarseness  Pulmonary: Denies shortness of breath ,cough  ,dyspnea on exertion, orthopnea  ,PND   Cardiovascular:  Denies bradycardia , tachycardia  ,palpations, lower extremity edema leg, claudication  Breast:  Denies new or changing breast lumps ,nipple discharge ,nipple changes  Abdomen:  Denies abdominal pain , anorexia , indigestion, nausea, vomiting, constipation, diarrhea  Musculoskeletal: Denies myalgias, arthralgias, joint swelling, joint stiffness , limb pain, limb swelling  Gu: denies dysuria, polyuria  Skin: Denies skin rash, skin lesion, skin wound, itching, dry skin  Neuro: Denies headache, numbness, tingling, confusion, loss of consciousness, dizziness, vertigo  Psychiatric: Denies feelings of depression, suicidal ideation, anxiety, sleep disturbances    OBJECTIVE  /80 (BP Location: Right arm, Patient Position: Sitting, Cuff Size: Standard)   Pulse 76   Temp 98 2 °F (36 8 °C)   Ht 5' 5" (1 651 m)   Wt 76 2 kg (168 lb)   BMI 27 96 kg/m²   Constitutional:   NAD, well appearing and well nourished      ENT:   Conjunctiva and lids: no injection, edema, or discharge     Pupils and iris: ELAINE bilaterally    External inspection of ears and nose: normal without deformities or discharge  Otoscopic exam: Canals patent without erythema  Nasal mucosa, septum and turbinates: Normal or edema or discharge         Oropharynx:  Moist mucosa, normal tongue and tonsils without lesions  No erythema        Pulmonary:Respiratory effort normal rate and rhythm, no increased work of breathing   Auscultation of lungs:  Clear bilaterally with no adventitious breath sounds       Cardiovascular: regular rate and rhythm, S1 and S2, no murmur, no edema and/or varicosities of LE      Abdomen: Soft and non-distended     Positive bowel sounds      No heptomegaly or splenomegaly      Gu: no suprapubic tenderness or CVA tenderness, no urethral discharge  Lymphatic:  No anterior or posterior cervical lymphadenopathy Musculoskeletal:  Gait and station: Normal gait      Digits and nails normal without clubbing or cyanosis       Inspection/palpation of joints, bones, and muscles:  No joint tenderness, swelling, full active and passive range of motion       Skin: Normal skin turgor and no rashes      Neuro:    Normal reflexes     Psych:   alert and oriented to person, place and time     normal mood and affect       Assessment/Plan:Diagnoses and all orders for this visit:    Healthcare maintenance    Essential hypertension  -     Comprehensive metabolic panel; Future    Hypothyroidism, unspecified type  -     TSH, 3rd generation; Future    Hypercholesterolemia  -     Lipid Panel with Direct LDL reflex; Future    Need for vaccination  -     influenza vaccine, 6365-0136, high-dose, PF 0 5 mL (FLUZONE HIGH-DOSE)    Vitamin D deficiency  -     Vitamin D 25 hydroxy; Future    Edema, unspecified type  -     potassium chloride (MICRO-K) 10 MEQ CR capsule; Take 2 capsules (20 mEq total) by mouth daily  -     spironolactone (ALDACTONE) 25 mg tablet; Take 1 tablet (25 mg total) by mouth daily    Allergic rhinitis, unspecified seasonality, unspecified trigger  -     fluticasone (FLONASE) 50 mcg/act nasal spray; 2 sprays into each nostril daily        I will see patient back in 6 mos or sooner prn

## 2019-10-24 DIAGNOSIS — R42 VERTIGO: Primary | ICD-10-CM

## 2019-10-24 DIAGNOSIS — J06.9 UPPER RESPIRATORY TRACT INFECTION, UNSPECIFIED TYPE: ICD-10-CM

## 2019-10-24 RX ORDER — MECLIZINE HYDROCHLORIDE 25 MG/1
25 TABLET ORAL 3 TIMES DAILY PRN
Qty: 30 TABLET | Refills: 2 | Status: SHIPPED | OUTPATIENT
Start: 2019-10-24 | End: 2020-07-23 | Stop reason: ALTCHOICE

## 2019-12-17 ENCOUNTER — OFFICE VISIT (OUTPATIENT)
Dept: FAMILY MEDICINE CLINIC | Facility: CLINIC | Age: 64
End: 2019-12-17
Payer: COMMERCIAL

## 2019-12-17 VITALS
TEMPERATURE: 98.5 F | HEART RATE: 74 BPM | SYSTOLIC BLOOD PRESSURE: 118 MMHG | WEIGHT: 165 LBS | BODY MASS INDEX: 27.49 KG/M2 | HEIGHT: 65 IN | DIASTOLIC BLOOD PRESSURE: 78 MMHG

## 2019-12-17 DIAGNOSIS — J01.90 ACUTE SINUSITIS, RECURRENCE NOT SPECIFIED, UNSPECIFIED LOCATION: Primary | ICD-10-CM

## 2019-12-17 DIAGNOSIS — R53.83 OTHER FATIGUE: ICD-10-CM

## 2019-12-17 PROCEDURE — 1036F TOBACCO NON-USER: CPT | Performed by: FAMILY MEDICINE

## 2019-12-17 PROCEDURE — 99213 OFFICE O/P EST LOW 20 MIN: CPT | Performed by: FAMILY MEDICINE

## 2019-12-17 PROCEDURE — 3008F BODY MASS INDEX DOCD: CPT | Performed by: FAMILY MEDICINE

## 2019-12-17 RX ORDER — BROMPHENIRAMINE MALEATE, PSEUDOEPHEDRINE HYDROCHLORIDE, AND DEXTROMETHORPHAN HYDROBROMIDE 2; 30; 10 MG/5ML; MG/5ML; MG/5ML
10 SYRUP ORAL 4 TIMES DAILY PRN
Qty: 180 ML | Refills: 0 | Status: SHIPPED | OUTPATIENT
Start: 2019-12-17 | End: 2020-01-14 | Stop reason: ALTCHOICE

## 2019-12-17 RX ORDER — CEFUROXIME AXETIL 500 MG/1
500 TABLET ORAL EVERY 12 HOURS SCHEDULED
Qty: 20 TABLET | Refills: 0 | Status: SHIPPED | OUTPATIENT
Start: 2019-12-17 | End: 2019-12-27

## 2019-12-17 RX ORDER — PREDNISONE 10 MG/1
TABLET ORAL
Qty: 26 TABLET | Refills: 0 | Status: SHIPPED | OUTPATIENT
Start: 2019-12-17 | End: 2019-12-30 | Stop reason: ALTCHOICE

## 2019-12-17 NOTE — PROGRESS NOTES
Patient ID: Glen García is a 62 y o  female  HPI: 62 y  o female presenting with symptoms of sinus pain,pressure, nasal congestion, pnd dry cough , ear and throat pain  Symptoms for past one week      SUBJECTIVE    Family History   Problem Relation Age of Onset    No Known Problems Mother     Diabetes Father         Mellitus    Heart disease Father     Hypertension Father      Social History     Socioeconomic History    Marital status: /Civil Union     Spouse name: Not on file    Number of children: Not on file    Years of education: Not on file    Highest education level: Not on file   Occupational History    Not on file   Social Needs    Financial resource strain: Not on file    Food insecurity:     Worry: Not on file     Inability: Not on file    Transportation needs:     Medical: Not on file     Non-medical: Not on file   Tobacco Use    Smoking status: Never Smoker    Smokeless tobacco: Former User   Substance and Sexual Activity    Alcohol use: No    Drug use: No    Sexual activity: Not on file   Lifestyle    Physical activity:     Days per week: Not on file     Minutes per session: Not on file    Stress: Not on file   Relationships    Social connections:     Talks on phone: Not on file     Gets together: Not on file     Attends Alevism service: Not on file     Active member of club or organization: Not on file     Attends meetings of clubs or organizations: Not on file     Relationship status: Not on file    Intimate partner violence:     Fear of current or ex partner: Not on file     Emotionally abused: Not on file     Physically abused: Not on file     Forced sexual activity: Not on file   Other Topics Concern    Not on file   Social History Narrative    Denied: History of daily coffee consumption    Daily cola consumption     Past Medical History:   Diagnosis Date    Disease of thyroid gland     Hypertension      Past Surgical History:   Procedure Laterality Date     SECTION      [place] LAP,CHOLECYSTECTOMY N/A 11/15/2017    Procedure: CHOLECYSTECTOMY LAPAROSCOPIC, umbilical hernia repair;  Surgeon: Chasity Bonilla MD;  Location: [facility];  Service: General    TONSILLECTOMY       Allergies   Allergen Reactions    Morphine GI Intolerance     Reaction Date: 2011;     Escitalopram Rash     Reaction Date: 2011;        Current Outpatient Medications:     ADVAIR DISKUS 250-50 MCG/DOSE inhaler, INHALE 1 PUFF 2 (TWO) TIMES A DAY RINSE MOUTH AFTER USE , Disp: 60 Inhaler, Rfl: 1    amLODIPine (NORVASC) 5 mg tablet, 1 1/2 TAB DAILY, Disp: 45 tablet, Rfl: 0    Ascorbic Acid (VITAMIN C) 500 MG CAPS, Take by mouth, Disp: , Rfl:     Azelastine HCl 137 MCG/SPRAY SOLN, 1 SPRAY INTO EACH NOSTRIL 2 (TWO) TIMES A DAY USE IN EACH NOSTRIL AS DIRECTED, Disp: 30 mL, Rfl: 3    Cetirizine HCl (ZYRTEC ALLERGY) 10 MG CAPS, Take by mouth, Disp: , Rfl:     famotidine (PEPCID) 40 MG tablet, Take 1 tablet (40 mg total) by mouth daily, Disp: 90 tablet, Rfl: 3    fluticasone (FLONASE) 50 mcg/act nasal spray, 2 sprays into each nostril daily, Disp: 15 8 g, Rfl: 5    levothyroxine 125 mcg tablet, Take 1 tablet (125 mcg total) by mouth daily in the early morning, Disp: 30 tablet, Rfl: 5    lisinopril (ZESTRIL) 20 mg tablet, TAKE 1 TABLET BY MOUTH EVERY DAY, Disp: 90 tablet, Rfl: 1    losartan (COZAAR) 100 MG tablet, Take 1 tablet (100 mg total) by mouth daily for 90 days, Disp: 90 tablet, Rfl: 5    meclizine (ANTIVERT) 25 mg tablet, Take 1 tablet (25 mg total) by mouth 3 (three) times a day as needed for dizziness, Disp: 30 tablet, Rfl: 2    potassium chloride (MICRO-K) 10 MEQ CR capsule, Take 2 capsules (20 mEq total) by mouth daily, Disp: 180 capsule, Rfl: 0    spironolactone (ALDACTONE) 25 mg tablet, Take 1 tablet (25 mg total) by mouth daily, Disp: 30 tablet, Rfl: 3    brompheniramine-pseudoephedrine-DM 30-2-10 MG/5ML syrup, Take 10 mL by mouth 4 (four) times a day as needed for congestion or cough, Disp: 180 mL, Rfl: 0    cefuroxime (CEFTIN) 500 mg tablet, Take 1 tablet (500 mg total) by mouth every 12 (twelve) hours for 10 days, Disp: 20 tablet, Rfl: 0    predniSONE 10 mg tablet, 3 tabs po bid x2 days, then 2 tabs po bid x2 days, then 1 tab bid x2 days, then 1 daily until done , Disp: 26 tablet, Rfl: 0    Review of Systems  Constitutional:     Denies fever, chills, fatigue, weakness ,weight loss, weight gain      ENT: Denies earache, loss of hearing, nosebleed, nasal discharge,but complains of nasal congestion, sore throat,hoarseness and sinus pain and pressure    Pulmonary: Denies shortness of breath ,cough , dyspnea on exertionon, orthopnea ,+ PND   Cardiovascular:  Denies bradycardia , tachycardia ,palpations, lower extremity, edema leg, claudication  Breast:  Denies new or changing breast lumps,  nipple discharge, nipple changes,  Abdomen:  Denies abdominal pain , anorexia ,indigestion, nausea ,vomiting, constipation , diarrhea  Musculoskeletal: Denies myalgias, arthralgias, joint swelling, joint stiffness ,limb pain, limb swelling  Lymph:+ swollen glands  Gu: no dysuria or urinary frequency  Skin: Denies skin rash, skin lesion, skin wound, itching,dry skin  Neuro: Denies headache, numbness, tingling, confusion, loss of consciousness, dizziness ,vertigo  Psychiatric: Denies feelings of depression, suicidal ideation, anxiety, sleep disturbances    OBJECTIVE  /78   Pulse 74   Temp 98 5 °F (36 9 °C)   Ht 5' 5" (1 651 m)   Wt 74 8 kg (165 lb)   BMI 27 46 kg/m²   Constitutional:   NAD, well appearing and well nourished      ENT:   Conjunctiva and lids: no injection, edema, or discharge    Pupils and iris: ELAINE bilaterally   External inspection of ears and nose: normal without deformities or discharge        Otoscopic exam: Canals patent ; tm are dull, with with erythem and effusions  ENasal mucosa, septum and turbinates: Turbinae injection with discharge   Oropharynx:  Moist mucosa, normal tongue and tonsils without lesions  Erythema and injection  of post pharynx with pnd      Pulmonary:Respiratory effort normal rate and rhythm, no increased work of breathing  Auscultation of lungs:  Clear bilaterally with no adventitious breath sounds       Cardiovascular: regular rate and rhythm, S1 and S2, no murmur, no edema and/or varicosities of LE      Abdomen: Soft and non-distended    Positive bowel sounds    No heptomegaly or splenomegaly    Lymphatic: Anterior  cervical lymphadenopathy         Muscskeletal:  Gait and station: Normal gait     Digits and nails normal without clubbing or cyanosis     Inspection/palpation of joints, bones, and muscles:  No joint tenderness, swelling, full active and passive range of motion      Gu: no suprabubic tenderness, CVA tenderness or urethral discharge  Skin: Normal skin turgor and no rashes    Neuro:    Normal reflexes   Psych:   alert and oriented to person, place and time  normal mood and affect      Assessment/Plan:Diagnoses and all orders for this visit:    Acute sinusitis, recurrence not specified, unspecified location  -     cefuroxime (CEFTIN) 500 mg tablet; Take 1 tablet (500 mg total) by mouth every 12 (twelve) hours for 10 days  -     predniSONE 10 mg tablet; 3 tabs po bid x2 days, then 2 tabs po bid x2 days, then 1 tab bid x2 days, then 1 daily until done  -     brompheniramine-pseudoephedrine-DM 30-2-10 MG/5ML syrup; Take 10 mL by mouth 4 (four) times a day as needed for congestion or cough    Other fatigue  -     CBC and Platelet; Future        Reviewed with patient plan to treat with above plan      Patient instructed to call in 72 hours if not feeling better or if symptoms worsen

## 2019-12-20 DIAGNOSIS — J20.9 ACUTE BRONCHITIS, UNSPECIFIED ORGANISM: Primary | ICD-10-CM

## 2019-12-20 RX ORDER — DEXTROMETHORPHAN HYDROBROMIDE AND PROMETHAZINE HYDROCHLORIDE 15; 6.25 MG/5ML; MG/5ML
5 SOLUTION ORAL 4 TIMES DAILY PRN
Qty: 180 ML | Refills: 0 | Status: SHIPPED | OUTPATIENT
Start: 2019-12-20 | End: 2019-12-30 | Stop reason: ALTCHOICE

## 2019-12-20 RX ORDER — DOXYCYCLINE HYCLATE 100 MG/1
100 CAPSULE ORAL EVERY 12 HOURS SCHEDULED
Qty: 20 CAPSULE | Refills: 0 | Status: SHIPPED | OUTPATIENT
Start: 2019-12-20 | End: 2019-12-30 | Stop reason: ALTCHOICE

## 2019-12-30 ENCOUNTER — OFFICE VISIT (OUTPATIENT)
Dept: FAMILY MEDICINE CLINIC | Facility: CLINIC | Age: 64
End: 2019-12-30
Payer: COMMERCIAL

## 2019-12-30 VITALS
HEIGHT: 65 IN | TEMPERATURE: 98.5 F | SYSTOLIC BLOOD PRESSURE: 122 MMHG | DIASTOLIC BLOOD PRESSURE: 84 MMHG | BODY MASS INDEX: 28.16 KG/M2 | WEIGHT: 169 LBS | HEART RATE: 72 BPM

## 2019-12-30 DIAGNOSIS — J06.9 UPPER RESPIRATORY TRACT INFECTION, UNSPECIFIED TYPE: Primary | ICD-10-CM

## 2019-12-30 PROCEDURE — 3008F BODY MASS INDEX DOCD: CPT | Performed by: FAMILY MEDICINE

## 2019-12-30 PROCEDURE — 99213 OFFICE O/P EST LOW 20 MIN: CPT | Performed by: FAMILY MEDICINE

## 2019-12-30 RX ORDER — GUAIFENESIN AND CODEINE PHOSPHATE 100; 10 MG/5ML; MG/5ML
5 SOLUTION ORAL 3 TIMES DAILY PRN
Qty: 180 ML | Refills: 1 | Status: SHIPPED | OUTPATIENT
Start: 2019-12-30 | End: 2020-01-14 | Stop reason: ALTCHOICE

## 2019-12-30 RX ORDER — PREDNISONE 10 MG/1
TABLET ORAL
Qty: 26 TABLET | Refills: 0 | Status: SHIPPED | OUTPATIENT
Start: 2019-12-30 | End: 2020-01-14 | Stop reason: ALTCHOICE

## 2019-12-30 NOTE — LETTER
December 30, 2019     Patient: Ariel Cali   YOB: 1955   Date of Visit: 12/30/2019       To Whom it May Concern:    Driss Gonzalez is under my professional care  She was seen in my office on 12/30/2019  She may return to work on 1/6/19  If you have any questions or concerns, please don't hesitate to call           Sincerely,          Jyothi De La Rosa,         CC: No Recipients

## 2019-12-31 NOTE — PROGRESS NOTES
Patient ID: Kenney Woods is a 62 y o  female  HPI: 62 y  o female presenting with 12 day  history of sore throat, nasal congestion, ear pain,pnd and cough  Pt denies any fever, chills, or body aches    She finished doxycyline, zpak but symptoms persist       SUBJECTIVE    Family History   Problem Relation Age of Onset    No Known Problems Mother     Diabetes Father         Mellitus    Heart disease Father     Hypertension Father      Social History     Socioeconomic History    Marital status: /Civil Union     Spouse name: Not on file    Number of children: Not on file    Years of education: Not on file    Highest education level: Not on file   Occupational History    Not on file   Social Needs    Financial resource strain: Not on file    Food insecurity:     Worry: Not on file     Inability: Not on file    Transportation needs:     Medical: Not on file     Non-medical: Not on file   Tobacco Use    Smoking status: Never Smoker    Smokeless tobacco: Former User   Substance and Sexual Activity    Alcohol use: No    Drug use: No    Sexual activity: Not on file   Lifestyle    Physical activity:     Days per week: Not on file     Minutes per session: Not on file    Stress: Not on file   Relationships    Social connections:     Talks on phone: Not on file     Gets together: Not on file     Attends Mandaen service: Not on file     Active member of club or organization: Not on file     Attends meetings of clubs or organizations: Not on file     Relationship status: Not on file    Intimate partner violence:     Fear of current or ex partner: Not on file     Emotionally abused: Not on file     Physically abused: Not on file     Forced sexual activity: Not on file   Other Topics Concern    Not on file   Social History Narrative    Denied: History of daily coffee consumption    Daily cola consumption     Past Medical History:   Diagnosis Date    Disease of thyroid gland     Hypertension Past Surgical History:   Procedure Laterality Date     SECTION      [place] LAP,CHOLECYSTECTOMY N/A 11/15/2017    Procedure: CHOLECYSTECTOMY LAPAROSCOPIC, umbilical hernia repair;  Surgeon: Jerry Medina MD;  Location: [facility];  Service: General    TONSILLECTOMY       Allergies   Allergen Reactions    Morphine GI Intolerance     Reaction Date: 2011;     Escitalopram Rash     Reaction Date: 2011;        Current Outpatient Medications:     ADVAIR DISKUS 250-50 MCG/DOSE inhaler, INHALE 1 PUFF 2 (TWO) TIMES A DAY RINSE MOUTH AFTER USE , Disp: 60 Inhaler, Rfl: 1    amLODIPine (NORVASC) 5 mg tablet, 1 1/2 TAB DAILY, Disp: 45 tablet, Rfl: 0    Ascorbic Acid (VITAMIN C) 500 MG CAPS, Take by mouth, Disp: , Rfl:     Azelastine HCl 137 MCG/SPRAY SOLN, 1 SPRAY INTO EACH NOSTRIL 2 (TWO) TIMES A DAY USE IN EACH NOSTRIL AS DIRECTED, Disp: 30 mL, Rfl: 3    brompheniramine-pseudoephedrine-DM 30-2-10 MG/5ML syrup, Take 10 mL by mouth 4 (four) times a day as needed for congestion or cough, Disp: 180 mL, Rfl: 0    Cetirizine HCl (ZYRTEC ALLERGY) 10 MG CAPS, Take by mouth, Disp: , Rfl:     famotidine (PEPCID) 40 MG tablet, Take 1 tablet (40 mg total) by mouth daily, Disp: 90 tablet, Rfl: 3    fluticasone (FLONASE) 50 mcg/act nasal spray, 2 sprays into each nostril daily, Disp: 15 8 g, Rfl: 5    levothyroxine 125 mcg tablet, Take 1 tablet (125 mcg total) by mouth daily in the early morning, Disp: 30 tablet, Rfl: 5    lisinopril (ZESTRIL) 20 mg tablet, TAKE 1 TABLET BY MOUTH EVERY DAY, Disp: 90 tablet, Rfl: 1    losartan (COZAAR) 100 MG tablet, Take 1 tablet (100 mg total) by mouth daily for 90 days, Disp: 90 tablet, Rfl: 5    meclizine (ANTIVERT) 25 mg tablet, Take 1 tablet (25 mg total) by mouth 3 (three) times a day as needed for dizziness, Disp: 30 tablet, Rfl: 2    potassium chloride (MICRO-K) 10 MEQ CR capsule, Take 2 capsules (20 mEq total) by mouth daily, Disp: 180 capsule, Rfl: 0   spironolactone (ALDACTONE) 25 mg tablet, Take 1 tablet (25 mg total) by mouth daily, Disp: 30 tablet, Rfl: 3    guaifenesin-codeine (GUAIFENESIN AC) 100-10 MG/5ML liquid, Take 5 mL by mouth 3 (three) times a day as needed for cough, Disp: 180 mL, Rfl: 1    predniSONE 10 mg tablet, 3 tabs po bid x2 days, then 2 tabs po bid x2 days, then 1 tab bid x2 days, then 1 daily until done , Disp: 26 tablet, Rfl: 0    Review of Systems  Constitutional:     Denies fever, chills ,fatigue ,weakness ,weight loss, weight gain     ENT: Denies loss of hearing ,nosebleed, nasal discharge ,hoarseness; but admits to nasal congestion , sore throat and ear pain  Pulmonary: Denies shortness of breath ,dyspnea on exertion, orthopnea ; but admits to cough and pnd  Cardiovascular:  Denies bradycardia , tachycardia  ,palpations, lower extremity edema leg, claudication  Breast:  Denies new or changing breast lumps ,nipple discharge ,nipple changes  Abdomen:  Denies abdominal pain , anorexia , indigestion, nausea, vomiting, constipation, diarrhea  Musculoskeletal: Denies myalgias, arthralgias, joint swelling, joint stiffness , limb pain, limb swelling  Lymph: + swollen glands  Gu: Denies polyuria or dysuria  Skin: Denies skin rash, skin lesion, skin wound, itching, dry skin  Neuro: Denies headache, numbness, tingling, confusion, loss of consciousness, dizziness, vertigo  Psychiatric: Denies feelings of depression, suicidal ideation, anxiety, sleep disturbances    OBJECTIVE  /84   Pulse 72   Temp 98 5 °F (36 9 °C)   Ht 5' 5" (1 651 m)   Wt 76 7 kg (169 lb)   BMI 28 12 kg/m²   Constitutional:   NAD, well appearing and well nourished     ENT:   Conjunctiva and lids: no injection, edema, or discharge    Pupils and iris: ELAINE bilaterally  External inspection of ears and nose: normal without deformities or discharge       Otoscopic exam: Canals patent without erythema, tm dull and erythematous, effusions bilaterally   Nasal mucosa, septum and turbinates: + turbinate injection, nasal discharge         Oropharynx:  Moist mucosa, normal tongue and tonsils without lesions  + erythema and injection of posterior pharynx with pnd    Pulmonary:Respiratory effort normal rate and rhythm, no increased work of breathing  Auscultation of lungs:  Clear bilaterally with no adventitious breath sounds     Cardiovascular: regular rate and rhythm, S1 and S2, no murmur, no edema and/or varicosities of LE     Abdomen: Soft and nontender with + bowel sounds  No heptomegaly or splenomegaly     Gu: no suprapubic tenderness or CVA tenderness  Lymphatic: + anterior  cervical lymphadenopathy      Musculoskeletal:  Gait and station: Normal gait      Digits and nails normal without clubbing or cyanosis      Inspection/palpation of joints, bones, and muscles:  No joint tenderness, swelling, full active and passive range of motion       Skin: Normal skin turgor and no rashes      Neuro:    Normal reflexes  Pych:   alert and oriented to person, place and time     normal mood and affect      Assessment/Plan:Diagnoses and all orders for this visit:    Upper respiratory tract infection, unspecified type  -     guaifenesin-codeine (GUAIFENESIN AC) 100-10 MG/5ML liquid; Take 5 mL by mouth 3 (three) times a day as needed for cough  -     predniSONE 10 mg tablet; 3 tabs po bid x2 days, then 2 tabs po bid x2 days, then 1 tab bid x2 days, then 1 daily until done  Reviewed with patient plan to treat with above plan      Patient instructed to call in 72 hours if not feeling better or if symptoms worsen

## 2020-01-11 ENCOUNTER — APPOINTMENT (OUTPATIENT)
Dept: LAB | Facility: CLINIC | Age: 65
End: 2020-01-11
Payer: COMMERCIAL

## 2020-01-11 ENCOUNTER — TRANSCRIBE ORDERS (OUTPATIENT)
Dept: LAB | Facility: CLINIC | Age: 65
End: 2020-01-11

## 2020-01-11 DIAGNOSIS — I10 ESSENTIAL HYPERTENSION: ICD-10-CM

## 2020-01-11 DIAGNOSIS — E55.9 VITAMIN D DEFICIENCY: ICD-10-CM

## 2020-01-11 DIAGNOSIS — R53.83 OTHER FATIGUE: ICD-10-CM

## 2020-01-11 DIAGNOSIS — E03.9 HYPOTHYROIDISM, UNSPECIFIED TYPE: ICD-10-CM

## 2020-01-11 DIAGNOSIS — E78.00 HYPERCHOLESTEROLEMIA: ICD-10-CM

## 2020-01-11 LAB
25(OH)D3 SERPL-MCNC: 27.4 NG/ML (ref 30–100)
ALBUMIN SERPL BCP-MCNC: 3.3 G/DL (ref 3.5–5)
ALP SERPL-CCNC: 113 U/L (ref 46–116)
ALT SERPL W P-5'-P-CCNC: 25 U/L (ref 12–78)
ANION GAP SERPL CALCULATED.3IONS-SCNC: 5 MMOL/L (ref 4–13)
AST SERPL W P-5'-P-CCNC: 21 U/L (ref 5–45)
BILIRUB SERPL-MCNC: 0.54 MG/DL (ref 0.2–1)
BUN SERPL-MCNC: 15 MG/DL (ref 5–25)
CALCIUM SERPL-MCNC: 9 MG/DL (ref 8.3–10.1)
CHLORIDE SERPL-SCNC: 105 MMOL/L (ref 100–108)
CHOLEST SERPL-MCNC: 194 MG/DL (ref 50–200)
CO2 SERPL-SCNC: 31 MMOL/L (ref 21–32)
CREAT SERPL-MCNC: 0.92 MG/DL (ref 0.6–1.3)
ERYTHROCYTE [DISTWIDTH] IN BLOOD BY AUTOMATED COUNT: 13.8 % (ref 11.6–15.1)
GFR SERPL CREATININE-BSD FRML MDRD: 66 ML/MIN/1.73SQ M
GLUCOSE P FAST SERPL-MCNC: 119 MG/DL (ref 65–99)
HCT VFR BLD AUTO: 40.6 % (ref 34.8–46.1)
HDLC SERPL-MCNC: 60 MG/DL
HGB BLD-MCNC: 13.3 G/DL (ref 11.5–15.4)
LDLC SERPL CALC-MCNC: 118 MG/DL (ref 0–100)
MCH RBC QN AUTO: 31.1 PG (ref 26.8–34.3)
MCHC RBC AUTO-ENTMCNC: 32.8 G/DL (ref 31.4–37.4)
MCV RBC AUTO: 95 FL (ref 82–98)
PLATELET # BLD AUTO: 180 THOUSANDS/UL (ref 149–390)
PMV BLD AUTO: 10.5 FL (ref 8.9–12.7)
POTASSIUM SERPL-SCNC: 4.3 MMOL/L (ref 3.5–5.3)
PROT SERPL-MCNC: 7.1 G/DL (ref 6.4–8.2)
RBC # BLD AUTO: 4.27 MILLION/UL (ref 3.81–5.12)
SODIUM SERPL-SCNC: 141 MMOL/L (ref 136–145)
TRIGL SERPL-MCNC: 81 MG/DL
TSH SERPL DL<=0.05 MIU/L-ACNC: 1.84 UIU/ML (ref 0.36–3.74)
WBC # BLD AUTO: 7.34 THOUSAND/UL (ref 4.31–10.16)

## 2020-01-11 PROCEDURE — 85027 COMPLETE CBC AUTOMATED: CPT

## 2020-01-11 PROCEDURE — 82306 VITAMIN D 25 HYDROXY: CPT

## 2020-01-11 PROCEDURE — 84443 ASSAY THYROID STIM HORMONE: CPT

## 2020-01-11 PROCEDURE — 80061 LIPID PANEL: CPT

## 2020-01-11 PROCEDURE — 80053 COMPREHEN METABOLIC PANEL: CPT

## 2020-01-11 PROCEDURE — 36415 COLL VENOUS BLD VENIPUNCTURE: CPT

## 2020-01-13 ENCOUNTER — TELEPHONE (OUTPATIENT)
Dept: FAMILY MEDICINE CLINIC | Facility: CLINIC | Age: 65
End: 2020-01-13

## 2020-01-13 DIAGNOSIS — E55.9 VITAMIN D DEFICIENCY: Primary | ICD-10-CM

## 2020-01-13 NOTE — TELEPHONE ENCOUNTER
----- Message from KendyBety Greer sent at 1/13/2020  8:20 AM EST -----  Regarding: FW: Non-Urgent Medical Question  Contact: [phone]      ----- Message -----  From: Darshana Hannadmont  Sent: 1/13/2020   8:18 AM EST  To: [facility]  Subject: Non-Urgent Medical Question                      Good Morning Dr Joel Ignacio it or not I am still not great  I am hoping that I did not catch what Sheila has--pneumonia  No fever but getting hot flashes and last night 98 9 which I know is nothing but I am always 97 something  Dry cough which I have had all along  Are you in tomorrow night? I did come to work today (Monday) coughing is not much today but my back a little achy  Just would like you to listen to my lungs  I can take a deep breath now so the prednisone worked  I listened to my lungs Sat when I was coughing so much   I heard a little anterior right but then coughed and it cleared  Did not hear anything in the back but you know how difficult that is with Kaylyn Pearson holding the stethescope, LOL  Actually today is probably the best that I have felt in a long time but the kids are not in yet    hoping I end the day this good  If you want me to come in let me know    Thanks,  Delio Dan

## 2020-01-14 ENCOUNTER — OFFICE VISIT (OUTPATIENT)
Dept: FAMILY MEDICINE CLINIC | Facility: CLINIC | Age: 65
End: 2020-01-14
Payer: COMMERCIAL

## 2020-01-14 VITALS
BODY MASS INDEX: 28.32 KG/M2 | HEART RATE: 70 BPM | HEIGHT: 65 IN | OXYGEN SATURATION: 97 % | WEIGHT: 170 LBS | TEMPERATURE: 99.7 F | DIASTOLIC BLOOD PRESSURE: 82 MMHG | SYSTOLIC BLOOD PRESSURE: 124 MMHG

## 2020-01-14 DIAGNOSIS — J18.9 PNEUMONIA OF LEFT LOWER LOBE DUE TO INFECTIOUS ORGANISM: Primary | ICD-10-CM

## 2020-01-14 PROCEDURE — 96372 THER/PROPH/DIAG INJ SC/IM: CPT | Performed by: FAMILY MEDICINE

## 2020-01-14 PROCEDURE — 1036F TOBACCO NON-USER: CPT | Performed by: FAMILY MEDICINE

## 2020-01-14 PROCEDURE — 99213 OFFICE O/P EST LOW 20 MIN: CPT | Performed by: FAMILY MEDICINE

## 2020-01-14 PROCEDURE — 3008F BODY MASS INDEX DOCD: CPT | Performed by: FAMILY MEDICINE

## 2020-01-14 RX ORDER — LEVOFLOXACIN 500 MG/1
500 TABLET, FILM COATED ORAL EVERY 24 HOURS
Qty: 10 TABLET | Refills: 0 | Status: SHIPPED | OUTPATIENT
Start: 2020-01-14 | End: 2020-01-24

## 2020-01-14 RX ORDER — CEFTRIAXONE 1 G/1
1000 INJECTION, POWDER, FOR SOLUTION INTRAMUSCULAR; INTRAVENOUS ONCE
Status: COMPLETED | OUTPATIENT
Start: 2020-01-14 | End: 2020-01-14

## 2020-01-14 RX ORDER — HYDROCODONE POLISTIREX AND CHLORPHENIRAMINE POLISTIREX 10; 8 MG/5ML; MG/5ML
5 SUSPENSION, EXTENDED RELEASE ORAL EVERY 12 HOURS PRN
Qty: 120 ML | Refills: 0 | Status: SHIPPED | OUTPATIENT
Start: 2020-01-14 | End: 2020-07-23 | Stop reason: ALTCHOICE

## 2020-01-14 RX ADMIN — CEFTRIAXONE 1000 MG: 1 INJECTION, POWDER, FOR SOLUTION INTRAMUSCULAR; INTRAVENOUS at 17:51

## 2020-01-14 NOTE — PROGRESS NOTES
Patient ID: Severa Kingdom is a 62 y o  female  HPI: 62 y  o female presenting with symptoms of chest congestion, cough and wheezing  She has a low grade fever and pulse ox is 97%        SUBJECTIVE    Family History   Problem Relation Age of Onset    No Known Problems Mother     Diabetes Father         Mellitus    Heart disease Father     Hypertension Father      Social History     Socioeconomic History    Marital status: /Civil Union     Spouse name: Not on file    Number of children: Not on file    Years of education: Not on file    Highest education level: Not on file   Occupational History    Not on file   Social Needs    Financial resource strain: Not on file    Food insecurity:     Worry: Not on file     Inability: Not on file    Transportation needs:     Medical: Not on file     Non-medical: Not on file   Tobacco Use    Smoking status: Never Smoker    Smokeless tobacco: Former User   Substance and Sexual Activity    Alcohol use: No    Drug use: No    Sexual activity: Not on file   Lifestyle    Physical activity:     Days per week: Not on file     Minutes per session: Not on file    Stress: Not on file   Relationships    Social connections:     Talks on phone: Not on file     Gets together: Not on file     Attends [employer]op[employer] service: Not on file     Active member of club or organization: Not on file     Attends meetings of clubs or organizations: Not on file     Relationship status: Not on file    Intimate partner violence:     Fear of current or ex partner: Not on file     Emotionally abused: Not on file     Physically abused: Not on file     Forced sexual activity: Not on file   Other Topics Concern    Not on file   Social History Narrative    Denied: History of daily coffee consumption    Daily cola consumption     Past Medical History:   Diagnosis Date    Disease of thyroid gland     Hypertension      Past Surgical History:   Procedure Laterality Date     SECTION  [place] LAP,CHOLECYSTECTOMY N/A 11/15/2017    Procedure: CHOLECYSTECTOMY LAPAROSCOPIC, umbilical hernia repair;  Surgeon: Eric Koyanagi, MD;  Location[facility];  Service: General    TONSILLECTOMY       Allergies   Allergen Reactions    Morphine GI Intolerance     Reaction Date: 29Aug2011;     Escitalopram Rash     Reaction Date: 29Aug2011;        Current Outpatient Medications:     ADVAIR DISKUS 250-50 MCG/DOSE inhaler, INHALE 1 PUFF 2 (TWO) TIMES A DAY RINSE MOUTH AFTER USE , Disp: 60 Inhaler, Rfl: 1    amLODIPine (NORVASC) 5 mg tablet, 1 1/2 TAB DAILY, Disp: 45 tablet, Rfl: 0    Ascorbic Acid (VITAMIN C) 500 MG CAPS, Take by mouth, Disp: , Rfl:     Azelastine HCl 137 MCG/SPRAY SOLN, 1 SPRAY INTO EACH NOSTRIL 2 (TWO) TIMES A DAY USE IN EACH NOSTRIL AS DIRECTED, Disp: 30 mL, Rfl: 3    Cetirizine HCl (ZYRTEC ALLERGY) 10 MG CAPS, Take by mouth, Disp: , Rfl:     Cholecalciferol (VITAMIN D-3) 125 MCG (5000 UT) TABS, 1 po daily, Disp: 30 tablet, Rfl: 3    famotidine (PEPCID) 40 MG tablet, Take 1 tablet (40 mg total) by mouth daily, Disp: 90 tablet, Rfl: 3    fluticasone (FLONASE) 50 mcg/act nasal spray, 2 sprays into each nostril daily, Disp: 15 8 g, Rfl: 5    levothyroxine 125 mcg tablet, Take 1 tablet (125 mcg total) by mouth daily in the early morning, Disp: 30 tablet, Rfl: 5    lisinopril (ZESTRIL) 20 mg tablet, TAKE 1 TABLET BY MOUTH EVERY DAY, Disp: 90 tablet, Rfl: 1    losartan (COZAAR) 100 MG tablet, Take 1 tablet (100 mg total) by mouth daily for 90 days, Disp: 90 tablet, Rfl: 5    meclizine (ANTIVERT) 25 mg tablet, Take 1 tablet (25 mg total) by mouth 3 (three) times a day as needed for dizziness, Disp: 30 tablet, Rfl: 2    potassium chloride (MICRO-K) 10 MEQ CR capsule, Take 2 capsules (20 mEq total) by mouth daily, Disp: 180 capsule, Rfl: 0    spironolactone (ALDACTONE) 25 mg tablet, Take 1 tablet (25 mg total) by mouth daily, Disp: 30 tablet, Rfl: 3    hydrocodone-chlorpheniramine polistirex (TUSSIONEX) 10-8 mg/5 mL ER suspension, Take 5 mL by mouth every 12 (twelve) hours as needed for coughMax Daily Amount: 10 mL, Disp: 120 mL, Rfl: 0    levofloxacin (LEVAQUIN) 500 mg tablet, Take 1 tablet (500 mg total) by mouth every 24 hours for 10 days, Disp: 10 tablet, Rfl: 0  No current facility-administered medications for this visit  Review of Systems    Constitutional:     Denies , chills, fatigue, weakness ,weight loss, weight gain   +fever  ENT: Denies earache, loss of hearing, nosebleed, nasal discharge,nasal congestion, sore throat,hoarseness  Pulmonary: Denies shortness of breath , dyspnea on exertion, orthopnea ,but complains of cough, wheezing and pnd  Cardiovascular:  Denies bradycardia , tachycardia ,palpations, lower extremity, edema leg, claudication  Breast:  Denies new or changing breast lumps,  nipple discharge, nipple changes,  Abdomen:  Denies abdominal pain , anorexia ,indigestion, nausea ,vomiting, constipation , diarrhea  Musculoskeletal: Denies myalgias, arthralgias, joint swelling, joint stiffness ,limb pain, limb swelling  Lymph: denies swollen glands  Gu: no dysuria or urinary frequency  Skin: Denies skin rash, skin lesion, skin wound, itching,dry skin  Neuro: Denies headache, numbness, tingling, confusion, loss of consciousness, dizziness ,vertigo  Psychiatric: Denies feelings of depression, suicidal ideation, anxiety, sleep disturbances    OBJECTIVE  /82   Pulse 70   Temp 99 7 °F (37 6 °C)   Ht 5' 5" (1 651 m)   Wt 77 1 kg (170 lb)   SpO2 97%   BMI 28 29 kg/m²   Constitutional:   NAD, well appearing and well nourished      ENT:   Conjunctiva and lids: no injection, edema, or discharge     Pupils and iris: ELAINE bilaterally    External inspection of ears and nose: normal without deformities or discharge        Otoscopic exam: Canals patent without erythema; tm are dull and erythematous bilaterally       Nasal mucosa, septum and turbinates: Turbinae injection with discharge   Oropharynx:  Moist mucosa, normal tongue and tonsils without lesions  Erythema and injection  of post pharynx with pnd     Pulmonary:Respiratory effort normal rate and rhythm, no increased work of breathing  Auscultation of lungs:  Scattered rhonchi and expiratory wheezes bilaterally + rales in left base     Cardiovascular: regular rate and rhythm, S1 and S2, no murmur, no edema and/or varicosities of LE      Abdomen: Soft and non-distended    Positive bowel sounds    No heptomegaly or splenomegaly    Gu: no suprapubic tenderness, no CVA tenderness, or urethral discharge  Lymphatic: Anterior cervical lymphadenopathy     Muscskeletal:  Gait and station: Normal gait     Digits and nails normal without clubbing or cyanosis      Inspection/palpation of joints, bones, and muscles:  No joint tenderness, swelling, full active and passive range of motion  Skin: Normal skin turgor and no rashes      Neuro:    Normal reflexes       Psych:   alert and oriented to person, place and time     normal mood and affect       Assessment/Plan:Diagnoses and all orders for this visit:    Pneumonia of left lower lobe due to infectious organism ([facility] )  -     hydrocodone-chlorpheniramine polistirex (TUSSIONEX) 10-8 mg/5 mL ER suspension; Take 5 mL by mouth every 12 (twelve) hours as needed for coughMax Daily Amount: 10 mL  -     levofloxacin (LEVAQUIN) 500 mg tablet; Take 1 tablet (500 mg total) by mouth every 24 hours for 10 days  -     cefTRIAXone (ROCEPHIN) injection 1,000 mg        Reviewed with patient plan to treat with above plan      Patient instructed to call in 72 hours if not feeling better or if symptoms worsen

## 2020-01-17 DIAGNOSIS — J06.9 UPPER RESPIRATORY TRACT INFECTION, UNSPECIFIED TYPE: Primary | ICD-10-CM

## 2020-01-17 RX ORDER — PREDNISONE 10 MG/1
TABLET ORAL
Qty: 26 TABLET | Refills: 0 | Status: SHIPPED | OUTPATIENT
Start: 2020-01-17 | End: 2020-04-10 | Stop reason: ALTCHOICE

## 2020-02-08 DIAGNOSIS — I10 ESSENTIAL HYPERTENSION: ICD-10-CM

## 2020-02-09 DIAGNOSIS — E03.9 HYPOTHYROIDISM, UNSPECIFIED TYPE: ICD-10-CM

## 2020-02-10 RX ORDER — LEVOTHYROXINE SODIUM 125 UG/1
TABLET ORAL
Qty: 90 TABLET | Refills: 1 | Status: SHIPPED | OUTPATIENT
Start: 2020-02-10 | End: 2020-04-10 | Stop reason: SDUPTHER

## 2020-02-10 RX ORDER — LISINOPRIL 20 MG/1
TABLET ORAL
Qty: 90 TABLET | Refills: 1 | Status: SHIPPED | OUTPATIENT
Start: 2020-02-10 | End: 2020-08-24

## 2020-03-03 ENCOUNTER — OFFICE VISIT (OUTPATIENT)
Dept: FAMILY MEDICINE CLINIC | Facility: CLINIC | Age: 65
End: 2020-03-03
Payer: COMMERCIAL

## 2020-03-03 VITALS
DIASTOLIC BLOOD PRESSURE: 82 MMHG | HEIGHT: 65 IN | SYSTOLIC BLOOD PRESSURE: 128 MMHG | HEART RATE: 72 BPM | WEIGHT: 167 LBS | TEMPERATURE: 98.4 F | BODY MASS INDEX: 27.82 KG/M2

## 2020-03-03 DIAGNOSIS — M46.1 SACROILIITIS (HCC): Primary | ICD-10-CM

## 2020-03-03 PROCEDURE — 1036F TOBACCO NON-USER: CPT | Performed by: FAMILY MEDICINE

## 2020-03-03 PROCEDURE — 3079F DIAST BP 80-89 MM HG: CPT | Performed by: FAMILY MEDICINE

## 2020-03-03 PROCEDURE — 3008F BODY MASS INDEX DOCD: CPT | Performed by: FAMILY MEDICINE

## 2020-03-03 PROCEDURE — 99213 OFFICE O/P EST LOW 20 MIN: CPT | Performed by: FAMILY MEDICINE

## 2020-03-03 PROCEDURE — 3074F SYST BP LT 130 MM HG: CPT | Performed by: FAMILY MEDICINE

## 2020-03-03 RX ORDER — METHOCARBAMOL 500 MG/1
500 TABLET, FILM COATED ORAL
Qty: 10 TABLET | Refills: 0 | Status: SHIPPED | OUTPATIENT
Start: 2020-03-03 | End: 2020-03-16 | Stop reason: SDUPTHER

## 2020-03-03 RX ORDER — PREDNISONE 10 MG/1
TABLET ORAL
Qty: 26 TABLET | Refills: 0 | Status: SHIPPED | OUTPATIENT
Start: 2020-03-03 | End: 2020-04-10 | Stop reason: ALTCHOICE

## 2020-03-04 NOTE — PROGRESS NOTES
Patient ID: Smitha Chavez is a 62 y o  female  HPI: 62 y  o female presenting with left SI joint pain after trying to help pull up her father in law in bed and then trying to help her pregnant daughter out of bed last week  She has trouble changing positions, rolling on her left side  She denies any weakness of left leg or paresthesia or radicular symptoms down her left leg        SUBJECTIVE    Family History   Problem Relation Age of Onset    No Known Problems Mother     Diabetes Father         Mellitus    Heart disease Father     Hypertension Father      Social History     Socioeconomic History    Marital status: /Civil Union     Spouse name: Not on file    Number of children: Not on file    Years of education: Not on file    Highest education level: Not on file   Occupational History    Not on file   Social Needs    Financial resource strain: Not on file    Food insecurity:     Worry: Not on file     Inability: Not on file    Transportation needs:     Medical: Not on file     Non-medical: Not on file   Tobacco Use    Smoking status: Never Smoker    Smokeless tobacco: Former User   Substance and Sexual Activity    Alcohol use: No    Drug use: No    Sexual activity: Not on file   Lifestyle    Physical activity:     Days per week: Not on file     Minutes per session: Not on file    Stress: Not on file   Relationships    Social connections:     Talks on phone: Not on file     Gets together: Not on file     Attends Confucianism service: Not on file     Active member of club or organization: Not on file     Attends meetings of clubs or organizations: Not on file     Relationship status: Not on file    Intimate partner violence:     Fear of current or ex partner: Not on file     Emotionally abused: Not on file     Physically abused: Not on file     Forced sexual activity: Not on file   Other Topics Concern    Not on file   Social History Narrative    Denied: History of daily coffee consumption    Daily cola consumption     Past Medical History:   Diagnosis Date    Disease of thyroid gland     Hypertension      Past Surgical History:   Procedure Laterality Date     SECTION      [place] LAP,CHOLECYSTECTOMY N/A 11/15/2017    Procedure: CHOLECYSTECTOMY LAPAROSCOPIC, umbilical hernia repair;  Surgeon: Maral Friend MD;  [facility];  Service: General    TONSILLECTOMY       Allergies   Allergen Reactions    Morphine GI Intolerance     Reaction Date: 2011;     Escitalopram Rash     Reaction Date: 2011;        Current Outpatient Medications:     ADVAIR DISKUS 250-50 MCG/DOSE inhaler, INHALE 1 PUFF 2 (TWO) TIMES A DAY RINSE MOUTH AFTER USE , Disp: 60 Inhaler, Rfl: 1    amLODIPine (NORVASC) 5 mg tablet, 1 1/2 TAB DAILY, Disp: 45 tablet, Rfl: 0    Ascorbic Acid (VITAMIN C) 500 MG CAPS, Take by mouth, Disp: , Rfl:     Azelastine HCl 137 MCG/SPRAY SOLN, 1 SPRAY INTO EACH NOSTRIL 2 (TWO) TIMES A DAY USE IN EACH NOSTRIL AS DIRECTED, Disp: 30 mL, Rfl: 3    Cetirizine HCl (ZYRTEC ALLERGY) 10 MG CAPS, Take by mouth, Disp: , Rfl:     Cholecalciferol (VITAMIN D-3) 125 MCG (5000 UT) TABS, 1 po daily, Disp: 30 tablet, Rfl: 3    famotidine (PEPCID) 40 MG tablet, Take 1 tablet (40 mg total) by mouth daily, Disp: 90 tablet, Rfl: 3    fluticasone (FLONASE) 50 mcg/act nasal spray, 2 sprays into each nostril daily, Disp: 15 8 g, Rfl: 5    hydrocodone-chlorpheniramine polistirex (TUSSIONEX) 10-8 mg/5 mL ER suspension, Take 5 mL by mouth every 12 (twelve) hours as needed for coughMax Daily Amount: 10 mL, Disp: 120 mL, Rfl: 0    lisinopril (ZESTRIL) 20 mg tablet, TAKE 1 TABLET BY MOUTH EVERY DAY, Disp: 90 tablet, Rfl: 1    losartan (COZAAR) 100 MG tablet, Take 1 tablet (100 mg total) by mouth daily for 90 days, Disp: 90 tablet, Rfl: 5    meclizine (ANTIVERT) 25 mg tablet, Take 1 tablet (25 mg total) by mouth 3 (three) times a day as needed for dizziness, Disp: 30 tablet, Rfl: 2   methocarbamol (ROBAXIN) 500 mg tablet, Take 1 tablet (500 mg total) by mouth daily at bedtime, Disp: 10 tablet, Rfl: 0    potassium chloride (MICRO-K) 10 MEQ CR capsule, Take 2 capsules (20 mEq total) by mouth daily, Disp: 180 capsule, Rfl: 0    predniSONE 10 mg tablet, 3 tabs po bid x2 days, then 2 tabs po bid x2 days, then 1 tab bid x2 days, then 1 daily until done , Disp: 26 tablet, Rfl: 0    predniSONE 10 mg tablet, 3 tabs po bid x2 days, then 2 tabs po bid x2 days, then 1 tab bid x2 days, then 1 daily until done , Disp: 26 tablet, Rfl: 0    spironolactone (ALDACTONE) 25 mg tablet, Take 1 tablet (25 mg total) by mouth daily, Disp: 30 tablet, Rfl: 3    SYNTHROID 125 MCG tablet, TAKE 1 TABLET (125 MCG TOTAL) BY MOUTH DAILY IN THE EARLY MORNING, Disp: 90 tablet, Rfl: 1    Review of Systems  Constitutional:     Denies fever, chills ,fatigue ,weakness ,weight loss, weight gain     ENT: Denies earache ,loss of hearing ,nosebleed, nasal discharge,nasal congestion ,sore throat ,hoarseness  Pulmonary: Denies shortness of breath ,cough  ,dyspnea on exertion, orthopnea  ,PND   Cardiovascular:  Denies bradycardia , tachycardia  ,palpations, lower extremity edema leg, claudication  Breast:  Denies new or changing breast lumps ,nipple discharge ,nipple changes  Abdomen:  Denies abdominal pain , anorexia , indigestion, nausea, vomiting, constipation, diarrhea  Musculoskeletal: Denies myalgias, arthralgias, joint swelling, joint stiffness , limb pain, limb swelling + left sacroliac pain without radiaiton  Gu: denies dysuria, polyuria  Skin: Denies skin rash, skin lesion, skin wound, itching, dry skin  Neuro: Denies headache, numbness, tingling, confusion, loss of consciousness, dizziness, vertigo  Psychiatric: Denies feelings of depression, suicidal ideation, anxiety, sleep disturbances    OBJECTIVE  /82   Pulse 72   Temp 98 4 °F (36 9 °C)   Ht 5' 5" (1 651 m)   Wt 75 8 kg (167 lb)   BMI 27 79 kg/m² Constitutional:   NAD, well appearing and well nourished      ENT:   Conjunctiva and lids: no injection, edema, or discharge     Pupils and iris: ELAINE bilaterally    External inspection of ears and nose: normal without deformities or discharge  Otoscopic exam: Canals patent without erythema  Nasal mucosa, septum and turbinates: Normal or edema or discharge         Oropharynx:  Moist mucosa, normal tongue and tonsils without lesions  No erythema        Pulmonary:Respiratory effort normal rate and rhythm, no increased work of breathing  Auscultation of lungs:  Clear bilaterally with no adventitious breath sounds       Cardiovascular: regular rate and rhythm, S1 and S2, no murmur, no edema and/or varicosities of LE      Abdomen: Soft and non-distended     Positive bowel sounds      No heptomegaly or splenomegaly      Gu: no suprapubic tenderness or CVA tenderness, no urethral discharge  Lymphatic:  No anterior or posterior cervical lymphadenopathy         Musculoskeletal:  Gait and station: Normal gait      Digits and nails normal without clubbing or cyanosis       Inspection/palpation of joints, bones, and muscles:  +left SI joint tenderness on palpation with  swelling, full active and passive range of motion of lumbar spine on exam with negative SLR on left       Skin: Normal skin turgor and no rashes      Neuro:    Normal  CN 2-12     Normal reflexes     Normal sensation    Psych:   alert and oriented to person, place and time     normal mood and affect       Assessment/Plan:Diagnoses and all orders for this visit:    Sacroiliitis (HCC)  -     methocarbamol (ROBAXIN) 500 mg tablet; Take 1 tablet (500 mg total) by mouth daily at bedtime  -     predniSONE 10 mg tablet; 3 tabs po bid x2 days, then 2 tabs po bid x2 days, then 1 tab bid x2 days, then 1 daily until done  Reviewed with patient plan to treat with above plan      Patient instructed to call in 72 hours if not feeling better or if symptoms worsen

## 2020-03-16 ENCOUNTER — TELEPHONE (OUTPATIENT)
Dept: FAMILY MEDICINE CLINIC | Facility: CLINIC | Age: 65
End: 2020-03-16

## 2020-03-16 DIAGNOSIS — M46.1 SACROILIITIS (HCC): ICD-10-CM

## 2020-03-16 RX ORDER — PREDNISONE 10 MG/1
TABLET ORAL
Qty: 26 TABLET | Refills: 0 | Status: SHIPPED | OUTPATIENT
Start: 2020-03-16 | End: 2020-07-23 | Stop reason: ALTCHOICE

## 2020-03-16 RX ORDER — METHOCARBAMOL 500 MG/1
500 TABLET, FILM COATED ORAL
Qty: 30 TABLET | Refills: 0 | Status: SHIPPED | OUTPATIENT
Start: 2020-03-16 | End: 2020-07-23 | Stop reason: ALTCHOICE

## 2020-03-16 NOTE — TELEPHONE ENCOUNTER
Patient called stating she would like another round of prednisone from her appointment at 3/3/2020  Also she is asking if muscle relaxers can be called in?      [facility] [address] to check with pharmacy

## 2020-03-25 DIAGNOSIS — I10 ESSENTIAL HYPERTENSION: ICD-10-CM

## 2020-03-25 RX ORDER — AMLODIPINE BESYLATE 5 MG/1
TABLET ORAL
Qty: 135 TABLET | Refills: 1 | Status: SHIPPED | OUTPATIENT
Start: 2020-03-25 | End: 2021-03-09

## 2020-03-30 ENCOUNTER — TELEPHONE (OUTPATIENT)
Dept: FAMILY MEDICINE CLINIC | Facility: CLINIC | Age: 65
End: 2020-03-30

## 2020-03-30 ENCOUNTER — TELEMEDICINE (OUTPATIENT)
Dept: FAMILY MEDICINE CLINIC | Facility: CLINIC | Age: 65
End: 2020-03-30
Payer: COMMERCIAL

## 2020-03-30 DIAGNOSIS — M54.50 LUMBAR BACK PAIN: Primary | ICD-10-CM

## 2020-03-30 PROCEDURE — 99213 OFFICE O/P EST LOW 20 MIN: CPT | Performed by: FAMILY MEDICINE

## 2020-03-30 RX ORDER — METHOCARBAMOL 500 MG/1
500 TABLET, FILM COATED ORAL 3 TIMES DAILY
Qty: 90 TABLET | Refills: 0 | Status: SHIPPED | OUTPATIENT
Start: 2020-03-30 | End: 2020-07-23 | Stop reason: ALTCHOICE

## 2020-03-30 RX ORDER — OXYCODONE HYDROCHLORIDE AND ACETAMINOPHEN 5; 325 MG/1; MG/1
1 TABLET ORAL EVERY 6 HOURS PRN
Qty: 40 TABLET | Refills: 0 | Status: SHIPPED | OUTPATIENT
Start: 2020-03-30 | End: 2020-07-23 | Stop reason: ALTCHOICE

## 2020-03-30 NOTE — TELEPHONE ENCOUNTER
----- Message from Bety Narayanan sent at 3/30/2020  8:24 AM EDT -----  Regarding: FW: Non-Urgent Medical Question  Contact: [phone]      ----- Message -----  From: Jamey Del Toro  Sent: 3/29/2020   8:09 PM EDT  To: [facility]  Subject: Non-Urgent Chelsey Roca I hope you and your family are well  Just to update you on me and mom  I am still having trouble with my hip/leg/ buttocks, It is a little better since I took the second round of prednisone which I finished the beginning of last week  I know that you said xrays/mri  Truthfully I will suffer until all of this is over  I do not want to go near a hospital or doctors office if at all possible but at the time all of this ends, I will gladly go if I am still having the problem  Do you have any recommendations of what I can do until I can go for now  ? Basically it bothers me when lying and let me say even then a little better it mainly hurts when lying and I try to move like from side to back and back to side and to get up out of bed is  a 10 on the pain scale  Other than that I can function alright  Also I will need my Potassium CL ER 10 Meq capsules renewed 2 caps daily  I will wait to hear if you have any recommendations for my leg/buttocks and if not I will deal with it  Mom is a different story  She has had two episodes since she was in the hospital last month  This morning being the latest  I ran downstairs when Lynne Sotelo called us and found her sitting on the kitchen chair  Her eyes were rolled to the left and she would not respond to me  I tried getting a heart rate but was so low I could not hear it which was unusual for me  I heard her lungs which were clear  She had tremors of her hands and then started what I thought was the death rattle  I did think we were losing her but she then started falling to the left while on her chair   I caught her and Fonnie Blades got her back up into the upright position  He got a carotid pulse so we knew she was ok at that point  This episode lasted 3-4 minutes  After it was over she responded to me when I called her  I was able to then get an apical pulse of 56/min  Didn't have my cuff with me  I decided to do a blood sugar with glucometer on her--at this point she had 1 small spoonful of cheerios with no sugar just some milk  that she had just swallowed  She was 204 and that was at 9:51 AM  2 hrs  PP before lunch at 1:12 PM she was 232 with glucometer and before dinner she was 87 at 6:13  I don't know if this has any significance  I don't want to take her to a lab right now if I don't have to  Mendy Brower was asking me through all of this should he call the squad and I said no  I am afraid with her being compromised that she would not make it out alive if she caught the virus  Tomorrow was her appt with Dr Prabhjot Park but it has been cancelled  They told me to call them and at the end of the month and they would let me know what they are going to do about rescheduling  She has not had a shot in March and the next one was for Wed Apr 1st which they are almost positive they will change her treatment  Let me know what you think  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 and speak with each family twice weekly  Send info emails to the families and  join in video counseling sessions  Kind of a taste of whats to come  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

## 2020-04-07 DIAGNOSIS — R60.9 EDEMA, UNSPECIFIED TYPE: ICD-10-CM

## 2020-04-07 RX ORDER — SPIRONOLACTONE 25 MG/1
TABLET ORAL
Qty: 30 TABLET | Refills: 3 | Status: SHIPPED | OUTPATIENT
Start: 2020-04-07 | End: 2020-04-10 | Stop reason: SDUPTHER

## 2020-04-08 ENCOUNTER — TELEPHONE (OUTPATIENT)
Dept: FAMILY MEDICINE CLINIC | Facility: CLINIC | Age: 65
End: 2020-04-08

## 2020-04-10 ENCOUNTER — OFFICE VISIT (OUTPATIENT)
Dept: FAMILY MEDICINE CLINIC | Facility: CLINIC | Age: 65
End: 2020-04-10
Payer: COMMERCIAL

## 2020-04-10 VITALS
TEMPERATURE: 98.4 F | BODY MASS INDEX: 29.16 KG/M2 | RESPIRATION RATE: 16 BRPM | DIASTOLIC BLOOD PRESSURE: 80 MMHG | SYSTOLIC BLOOD PRESSURE: 130 MMHG | HEART RATE: 74 BPM | HEIGHT: 65 IN | WEIGHT: 175 LBS

## 2020-04-10 DIAGNOSIS — I10 ESSENTIAL HYPERTENSION: Primary | ICD-10-CM

## 2020-04-10 DIAGNOSIS — R60.9 EDEMA, UNSPECIFIED TYPE: ICD-10-CM

## 2020-04-10 DIAGNOSIS — E03.9 HYPOTHYROIDISM, UNSPECIFIED TYPE: ICD-10-CM

## 2020-04-10 PROCEDURE — 3079F DIAST BP 80-89 MM HG: CPT | Performed by: FAMILY MEDICINE

## 2020-04-10 PROCEDURE — 1036F TOBACCO NON-USER: CPT | Performed by: FAMILY MEDICINE

## 2020-04-10 PROCEDURE — 3075F SYST BP GE 130 - 139MM HG: CPT | Performed by: FAMILY MEDICINE

## 2020-04-10 PROCEDURE — 99213 OFFICE O/P EST LOW 20 MIN: CPT | Performed by: FAMILY MEDICINE

## 2020-04-10 RX ORDER — POTASSIUM CHLORIDE 750 MG/1
20 CAPSULE, EXTENDED RELEASE ORAL DAILY
Qty: 180 CAPSULE | Refills: 3 | Status: SHIPPED | OUTPATIENT
Start: 2020-04-10 | End: 2021-05-25

## 2020-04-10 RX ORDER — LEVOTHYROXINE SODIUM 125 UG/1
125 TABLET ORAL DAILY
Qty: 90 TABLET | Refills: 3 | Status: SHIPPED | OUTPATIENT
Start: 2020-04-10 | End: 2021-04-14

## 2020-04-10 RX ORDER — SPIRONOLACTONE 25 MG/1
25 TABLET ORAL DAILY
Qty: 90 TABLET | Refills: 3 | Status: SHIPPED | OUTPATIENT
Start: 2020-04-10 | End: 2021-05-19

## 2020-05-13 DIAGNOSIS — M54.50 BACK PAIN, LUMBOSACRAL: Primary | ICD-10-CM

## 2020-05-13 RX ORDER — GABAPENTIN 300 MG/1
300 CAPSULE ORAL
Qty: 30 CAPSULE | Refills: 3 | Status: SHIPPED | OUTPATIENT
Start: 2020-05-13 | End: 2020-05-19 | Stop reason: SDUPTHER

## 2020-05-19 DIAGNOSIS — M54.50 BACK PAIN, LUMBOSACRAL: ICD-10-CM

## 2020-05-19 RX ORDER — GABAPENTIN 300 MG/1
300 CAPSULE ORAL 3 TIMES DAILY
Qty: 90 CAPSULE | Refills: 3 | Status: SHIPPED | OUTPATIENT
Start: 2020-05-19 | End: 2020-09-25

## 2020-05-27 ENCOUNTER — TELEPHONE (OUTPATIENT)
Dept: FAMILY MEDICINE CLINIC | Facility: CLINIC | Age: 65
End: 2020-05-27

## 2020-05-27 ENCOUNTER — HOSPITAL ENCOUNTER (OUTPATIENT)
Dept: RADIOLOGY | Facility: HOSPITAL | Age: 65
Discharge: HOME | End: 2020-05-27
Payer: COMMERCIAL

## 2020-05-27 DIAGNOSIS — M54.50 BACK PAIN, LUMBOSACRAL: ICD-10-CM

## 2020-05-27 DIAGNOSIS — M25.552 HIP PAIN, ACUTE, LEFT: ICD-10-CM

## 2020-05-27 DIAGNOSIS — M25.551 HIP PAIN, ACUTE, RIGHT: Primary | ICD-10-CM

## 2020-05-27 PROCEDURE — 72110 X-RAY EXAM L-2 SPINE 4/>VWS: CPT

## 2020-05-27 PROCEDURE — 73502 X-RAY EXAM HIP UNI 2-3 VIEWS: CPT

## 2020-05-29 DIAGNOSIS — M54.16 LUMBAR RADICULOPATHY: Primary | ICD-10-CM

## 2020-05-29 DIAGNOSIS — K21.9 GASTROESOPHAGEAL REFLUX DISEASE WITHOUT ESOPHAGITIS: ICD-10-CM

## 2020-05-29 RX ORDER — FAMOTIDINE 40 MG/1
TABLET, FILM COATED ORAL
Qty: 90 TABLET | Refills: 3 | Status: SHIPPED | OUTPATIENT
Start: 2020-05-29 | End: 2021-05-14

## 2020-06-17 DIAGNOSIS — E55.9 VITAMIN D DEFICIENCY: ICD-10-CM

## 2020-07-23 ENCOUNTER — OFFICE VISIT (OUTPATIENT)
Dept: FAMILY MEDICINE CLINIC | Facility: CLINIC | Age: 65
End: 2020-07-23
Payer: COMMERCIAL

## 2020-07-23 VITALS
WEIGHT: 174 LBS | HEIGHT: 65 IN | BODY MASS INDEX: 28.99 KG/M2 | TEMPERATURE: 98.9 F | HEART RATE: 74 BPM | SYSTOLIC BLOOD PRESSURE: 122 MMHG | DIASTOLIC BLOOD PRESSURE: 84 MMHG

## 2020-07-23 DIAGNOSIS — J01.90 ACUTE SINUSITIS, RECURRENCE NOT SPECIFIED, UNSPECIFIED LOCATION: Primary | ICD-10-CM

## 2020-07-23 PROCEDURE — 3079F DIAST BP 80-89 MM HG: CPT | Performed by: FAMILY MEDICINE

## 2020-07-23 PROCEDURE — 1036F TOBACCO NON-USER: CPT | Performed by: FAMILY MEDICINE

## 2020-07-23 PROCEDURE — 3008F BODY MASS INDEX DOCD: CPT | Performed by: FAMILY MEDICINE

## 2020-07-23 PROCEDURE — 3074F SYST BP LT 130 MM HG: CPT | Performed by: FAMILY MEDICINE

## 2020-07-23 PROCEDURE — 99213 OFFICE O/P EST LOW 20 MIN: CPT | Performed by: FAMILY MEDICINE

## 2020-07-23 RX ORDER — METHYLPREDNISOLONE 4 MG/1
TABLET ORAL
Qty: 21 EACH | Refills: 0 | Status: SHIPPED | OUTPATIENT
Start: 2020-07-23 | End: 2020-10-12 | Stop reason: ALTCHOICE

## 2020-07-23 RX ORDER — CEFUROXIME AXETIL 250 MG/1
250 TABLET ORAL EVERY 12 HOURS SCHEDULED
Qty: 20 TABLET | Refills: 0 | Status: SHIPPED | OUTPATIENT
Start: 2020-07-23 | End: 2020-08-02

## 2020-07-27 NOTE — PROGRESS NOTES
Patient ID: Jarrod Brian is a 62 y o  female  HPI: 62 y  o female presenting with symptoms of sinus pain,pressure, nasal congestion, pnd , ear and throat pain  She denies any fever or myalgia         SUBJECTIVE    Family History   Problem Relation Age of Onset    No Known Problems Mother     Diabetes Father         Mellitus    Heart disease Father     Hypertension Father      Social History     Socioeconomic History    Marital status: /Civil Union     Spouse name: Not on file    Number of children: Not on file    Years of education: Not on file    Highest education level: Not on file   Occupational History    Not on file   Social Needs    Financial resource strain: Not on file    Food insecurity:     Worry: Not on file     Inability: Not on file    Transportation needs:     Medical: Not on file     Non-medical: Not on file   Tobacco Use    Smoking status: Never Smoker    Smokeless tobacco: Former User   Substance and Sexual Activity    Alcohol use: No    Drug use: No    Sexual activity: Not on file   Lifestyle    Physical activity:     Days per week: Not on file     Minutes per session: Not on file    Stress: Not on file   Relationships    Social connections:     Talks on phone: Not on file     Gets together: Not on file     Attends Synagogue service: Not on file     Active member of club or organization: Not on file     Attends meetings of clubs or organizations: Not on file     Relationship status: Not on file    Intimate partner violence:     Fear of current or ex partner: Not on file     Emotionally abused: Not on file     Physically abused: Not on file     Forced sexual activity: Not on file   Other Topics Concern    Not on file   Social History Narrative    Denied: History of daily coffee consumption    Daily cola consumption     Past Medical History:   Diagnosis Date    Disease of thyroid gland     Hypertension      Past Surgical History:   Procedure Laterality Date     SECTION      [place] LAP,CHOLECYSTECTOMY N/A 11/15/2017    Procedure: CHOLECYSTECTOMY LAPAROSCOPIC, umbilical hernia repair;  Surgeon: Kalyani Miramontes MD;  Location: [facility];  Service: General    TONSILLECTOMY       Allergies   Allergen Reactions    Morphine GI Intolerance     Reaction Date: 2011;     Escitalopram Rash     Reaction Date: 2011;        Current Outpatient Medications:     ADVAIR DISKUS 250-50 MCG/DOSE inhaler, INHALE 1 PUFF 2 (TWO) TIMES A DAY RINSE MOUTH AFTER USE , Disp: 60 Inhaler, Rfl: 1    amLODIPine (NORVASC) 5 mg tablet, TAKE 1 AND 1/2 TABLET DAILY, Disp: 135 tablet, Rfl: 1    Ascorbic Acid (VITAMIN C) 500 MG CAPS, Take by mouth, Disp: , Rfl:     Cetirizine HCl (ZYRTEC ALLERGY) 10 MG CAPS, Take by mouth, Disp: , Rfl:     Cholecalciferol (CVS D3) 125 MCG (5000 UT) capsule, TAKE 1 CAPSULE BY MOUTH EVERY DAY, Disp: 30 capsule, Rfl: 3    famotidine (PEPCID) 40 MG tablet, TAKE 1 TABLET BY MOUTH EVERY DAY, Disp: 90 tablet, Rfl: 3    fluticasone (FLONASE) 50 mcg/act nasal spray, 2 sprays into each nostril daily, Disp: 15 8 g, Rfl: 5    gabapentin (NEURONTIN) 300 mg capsule, Take 1 capsule (300 mg total) by mouth 3 (three) times a day, Disp: 90 capsule, Rfl: 3    lisinopril (ZESTRIL) 20 mg tablet, TAKE 1 TABLET BY MOUTH EVERY DAY, Disp: 90 tablet, Rfl: 1    potassium chloride (MICRO-K) 10 MEQ CR capsule, Take 2 capsules (20 mEq total) by mouth daily, Disp: 180 capsule, Rfl: 3    spironolactone (ALDACTONE) 25 mg tablet, Take 1 tablet (25 mg total) by mouth daily, Disp: 90 tablet, Rfl: 3    SYNTHROID 125 MCG tablet, Take 1 tablet (125 mcg total) by mouth daily, Disp: 90 tablet, Rfl: 3    cefuroxime (CEFTIN) 250 mg tablet, Take 1 tablet (250 mg total) by mouth every 12 (twelve) hours for 10 days, Disp: 20 tablet, Rfl: 0    methylPREDNISolone 4 MG tablet therapy pack, Use as directed on package, Disp: 21 each, Rfl: 0    Review of Systems  Constitutional:     Denies fever, chills, fatigue, weakness ,weight loss, weight gain      ENT: Denies earache, loss of hearing, nosebleed, nasal discharge,but complains of nasal congestion, sore throat,hoarseness and sinus pain and pressure    Pulmonary: Denies shortness of breath ,cough , dyspnea on exertionon, orthopnea ,+ PND   Cardiovascular:  Denies bradycardia , tachycardia ,palpations, lower extremity, edema leg, claudication  Breast:  Denies new or changing breast lumps,  nipple discharge, nipple changes,  Abdomen:  Denies abdominal pain , anorexia ,indigestion, nausea ,vomiting, constipation , diarrhea  Musculoskeletal: Denies myalgias, arthralgias, joint swelling, joint stiffness ,limb pain, limb swelling  Lymph:+ swollen glands  Gu: no dysuria or urinary frequency  Skin: Denies skin rash, skin lesion, skin wound, itching,dry skin  Neuro: Denies headache, numbness, tingling, confusion, loss of consciousness, dizziness ,vertigo  Psychiatric: Denies feelings of depression, suicidal ideation, anxiety, sleep disturbances    OBJECTIVE  /84   Pulse 74   Temp 98 9 °F (37 2 °C)   Ht 5' 5" (1 651 m)   Wt 78 9 kg (174 lb)   BMI 28 96 kg/m²   Constitutional:   NAD, well appearing and well nourished      ENT:   Conjunctiva and lids: no injection, edema, or discharge    Pupils and iris: ELAINE bilaterally   External inspection of ears and nose: normal without deformities or discharge  Otoscopic exam: Canals patent ; tm are dull, with with erythem and effusions  ENasal mucosa, septum and turbinates: Turbinae injection with discharge   Oropharynx:  Moist mucosa, normal tongue and tonsils without lesions  Erythema and injection  of post pharynx with pnd      Pulmonary:Respiratory effort normal rate and rhythm, no increased work of breathing   Auscultation of lungs:  Clear bilaterally with no adventitious breath sounds       Cardiovascular: regular rate and rhythm, S1 and S2, no murmur, no edema and/or varicosities of LE      Abdomen: Soft and non-distended    Positive bowel sounds    No heptomegaly or splenomegaly    Lymphatic: Anterior  cervical lymphadenopathy         Muscskeletal:  Gait and station: Normal gait     Digits and nails normal without clubbing or cyanosis     Inspection/palpation of joints, bones, and muscles:  No joint tenderness, swelling, full active and passive range of motion      Gu: no suprabubic tenderness, CVA tenderness or urethral discharge  Skin: Normal skin turgor and no rashes    Neuro:    Normal reflexes   Psych:   alert and oriented to person, place and time  normal mood and affec      Assessment/Plan:Diagnoses and all orders for this visit:    Acute sinusitis, recurrence not specified, unspecified location  -     methylPREDNISolone 4 MG tablet therapy pack; Use as directed on package  -     cefuroxime (CEFTIN) 250 mg tablet; Take 1 tablet (250 mg total) by mouth every 12 (twelve) hours for 10 days        Reviewed with patient plan to treat with above plan      Patient instructed to call in 72 hours if not feeling better or if symptoms worsen

## 2020-08-15 DIAGNOSIS — J06.9 UPPER RESPIRATORY TRACT INFECTION, UNSPECIFIED TYPE: ICD-10-CM

## 2020-08-22 DIAGNOSIS — I10 ESSENTIAL HYPERTENSION: ICD-10-CM

## 2020-08-24 RX ORDER — LISINOPRIL 20 MG/1
TABLET ORAL
Qty: 90 TABLET | Refills: 1 | Status: SHIPPED | OUTPATIENT
Start: 2020-08-24 | End: 2021-01-22 | Stop reason: SDUPTHER

## 2020-09-25 DIAGNOSIS — M54.50 BACK PAIN, LUMBOSACRAL: ICD-10-CM

## 2020-09-25 RX ORDER — GABAPENTIN 300 MG/1
CAPSULE ORAL
Qty: 90 CAPSULE | Refills: 3 | Status: SHIPPED | OUTPATIENT
Start: 2020-09-25 | End: 2021-04-27

## 2020-10-12 ENCOUNTER — OFFICE VISIT (OUTPATIENT)
Dept: FAMILY MEDICINE CLINIC | Facility: CLINIC | Age: 65
End: 2020-10-12
Payer: COMMERCIAL

## 2020-10-12 VITALS
BODY MASS INDEX: 28.66 KG/M2 | HEART RATE: 74 BPM | DIASTOLIC BLOOD PRESSURE: 84 MMHG | HEIGHT: 65 IN | TEMPERATURE: 98 F | WEIGHT: 172 LBS | SYSTOLIC BLOOD PRESSURE: 132 MMHG

## 2020-10-12 DIAGNOSIS — Z12.11 COLON CANCER SCREENING: ICD-10-CM

## 2020-10-12 DIAGNOSIS — Z23 NEED FOR VACCINATION: ICD-10-CM

## 2020-10-12 DIAGNOSIS — E78.00 HYPERCHOLESTEROLEMIA: ICD-10-CM

## 2020-10-12 DIAGNOSIS — I10 ESSENTIAL HYPERTENSION: Primary | ICD-10-CM

## 2020-10-12 DIAGNOSIS — E55.9 VITAMIN D DEFICIENCY: ICD-10-CM

## 2020-10-12 PROCEDURE — 3079F DIAST BP 80-89 MM HG: CPT | Performed by: FAMILY MEDICINE

## 2020-10-12 PROCEDURE — 99214 OFFICE O/P EST MOD 30 MIN: CPT | Performed by: FAMILY MEDICINE

## 2020-10-12 PROCEDURE — 90471 IMMUNIZATION ADMIN: CPT | Performed by: FAMILY MEDICINE

## 2020-10-12 PROCEDURE — 3725F SCREEN DEPRESSION PERFORMED: CPT | Performed by: FAMILY MEDICINE

## 2020-10-12 PROCEDURE — 90682 RIV4 VACC RECOMBINANT DNA IM: CPT | Performed by: FAMILY MEDICINE

## 2020-10-12 PROCEDURE — 1036F TOBACCO NON-USER: CPT | Performed by: FAMILY MEDICINE

## 2020-10-12 RX ORDER — CHOLECALCIFEROL (VITAMIN D3) 125 MCG
CAPSULE ORAL
Qty: 30 CAPSULE | Refills: 3 | Status: SHIPPED | OUTPATIENT
Start: 2020-10-12

## 2020-12-16 ENCOUNTER — VBI (OUTPATIENT)
Dept: ADMINISTRATIVE | Facility: OTHER | Age: 65
End: 2020-12-16

## 2021-01-22 ENCOUNTER — OFFICE VISIT (OUTPATIENT)
Dept: FAMILY MEDICINE CLINIC | Facility: CLINIC | Age: 66
End: 2021-01-22
Payer: COMMERCIAL

## 2021-01-22 VITALS
WEIGHT: 171.6 LBS | OXYGEN SATURATION: 98 % | SYSTOLIC BLOOD PRESSURE: 128 MMHG | HEART RATE: 82 BPM | BODY MASS INDEX: 28.59 KG/M2 | DIASTOLIC BLOOD PRESSURE: 74 MMHG | TEMPERATURE: 97.3 F | HEIGHT: 65 IN

## 2021-01-22 DIAGNOSIS — R63.8 INCREASED BMI: ICD-10-CM

## 2021-01-22 DIAGNOSIS — I10 ESSENTIAL HYPERTENSION: ICD-10-CM

## 2021-01-22 DIAGNOSIS — Z00.00 ANNUAL PHYSICAL EXAM: Primary | ICD-10-CM

## 2021-01-22 DIAGNOSIS — R53.83 OTHER FATIGUE: ICD-10-CM

## 2021-01-22 PROCEDURE — 99396 PREV VISIT EST AGE 40-64: CPT | Performed by: FAMILY MEDICINE

## 2021-01-22 PROCEDURE — 99214 OFFICE O/P EST MOD 30 MIN: CPT | Performed by: FAMILY MEDICINE

## 2021-01-22 PROCEDURE — 1036F TOBACCO NON-USER: CPT | Performed by: FAMILY MEDICINE

## 2021-01-22 PROCEDURE — 3074F SYST BP LT 130 MM HG: CPT | Performed by: FAMILY MEDICINE

## 2021-01-22 PROCEDURE — 3008F BODY MASS INDEX DOCD: CPT | Performed by: FAMILY MEDICINE

## 2021-01-22 PROCEDURE — 3078F DIAST BP <80 MM HG: CPT | Performed by: FAMILY MEDICINE

## 2021-01-22 PROCEDURE — 3725F SCREEN DEPRESSION PERFORMED: CPT | Performed by: FAMILY MEDICINE

## 2021-01-22 RX ORDER — LISINOPRIL 20 MG/1
20 TABLET ORAL DAILY
Qty: 90 TABLET | Refills: 1 | Status: SHIPPED | OUTPATIENT
Start: 2021-01-22 | End: 2021-06-28

## 2021-01-22 NOTE — PATIENT INSTRUCTIONS

## 2021-01-22 NOTE — PROGRESS NOTES
ADULT ANNUAL [address]    NAME: Barbara Palumbo  AGE: 60 y o  SEX: female  : 1955     DATE: 2021     Assessment and Plan:     Problem List Items Addressed This Visit     None          Immunizations and preventive care screenings were discussed with patient today  Appropriate education was printed on patient's after visit summary  Counseling:  · Exercise: the importance of regular exercise/physical activity was discussed  Recommend exercise 3-5 times per week for at least 30 minutes  No follow-ups on file  Chief Complaint:     Chief Complaint   Patient presents with    Annual Exam      History of Present Illness:     Adult Annual Physical   Patient here for a comprehensive physical exam  The patient reports no problems  Diet and Physical Activity  · Diet/Nutrition: well balanced diet  · Exercise: walking  Depression Screening  PHQ-9 Depression Screening    PHQ-9:   Frequency of the following problems over the past two weeks:      Little interest or pleasure in doing things: 0 - not at all  Feeling down, depressed, or hopeless: 0 - not at all       [address] [place] [address]  · Sleep: sleeps well  · Hearing: normal - bilateral   · Vision: no vision problems  · Dental: regular dental visits  /GYN Health  · Patient is: postmenopausal  · Last menstrual period:   · Contraceptive method:       Review of Systems:     Review of Systems   Constitutional: Negative for appetite change, chills and fever  HENT: Negative for ear pain, facial swelling, rhinorrhea, sinus pain, sore throat and trouble swallowing  Eyes: Negative for discharge and redness  Respiratory: Negative for chest tightness, shortness of breath and wheezing  Cardiovascular: Negative for chest pain and palpitations  Gastrointestinal: Negative for abdominal pain, diarrhea, nausea and vomiting  Endocrine: Negative for polyuria  Genitourinary: Negative for dysuria and urgency  Musculoskeletal: Negative for arthralgias and back pain  Skin: Negative for rash  Neurological: Negative for dizziness, weakness and headaches  Hematological: Negative for adenopathy  Psychiatric/Behavioral: Negative for behavioral problems, confusion and sleep disturbance  All other systems reviewed and are negative       Past Medical History:     Past Medical History:   Diagnosis Date    Disease of thyroid gland     Hypertension       Past Surgical History:     Past Surgical History:   Procedure Laterality Date     SECTION      [place] LAP,CHOLECYSTECTOMY N/A 11/15/2017    Procedure: CHOLECYSTECTOMY LAPAROSCOPIC, umbilical hernia repair;  Surgeon: Huma Rowley MD;  Location:  MAIN OR;  Service: General    TONSILLECTOMY        Social History:        Social History     Socioeconomic History    Marital status: /Civil Union     Spouse name: None    Number of children: None    Years of education: None    Highest education level: None   Occupational History    None   Social Needs    Financial resource strain: None    Food insecurity     Worry: None     Inability: None    Transportation needs     Medical: None     Non-medical: None   Tobacco Use    Smoking status: Never Smoker    Smokeless tobacco: Former User   Substance and Sexual Activity    Alcohol use: No    Drug use: No    Sexual activity: None   Lifestyle    Physical activity     Days per week: None     Minutes per session: None    Stress: None   Relationships    Social connections     Talks on phone: None     Gets together: None     Attends [employer] service: None     Active member of club or organization: None     Attends meetings of clubs or organizations: None     Relationship status: None    Intimate partner violence     Fear of current or ex partner: None     Emotionally abused: None     Physically abused: None     Forced sexual activity: None   Other Topics Concern    None   Social History Narrative    Denied: History of daily coffee consumption    Daily cola consumption      Family History:     Family History   Problem Relation Age of Onset    No Known Problems Mother     Diabetes Father         Mellitus    Heart disease Father     Hypertension Father       Current Medications:     Current Outpatient Medications   Medication Sig Dispense Refill    Advair Diskus 250-50 MCG/DOSE inhaler INHALE 1 PUFF 2 (TWO) TIMES A DAY RINSE MOUTH AFTER USE  1 Inhaler 1    amLODIPine (NORVASC) 5 mg tablet TAKE 1 AND 1/2 TABLET DAILY 135 tablet 1    Ascorbic Acid (VITAMIN C) 500 MG CAPS Take by mouth      Cetirizine HCl (ZYRTEC ALLERGY) 10 MG CAPS Take by mouth      Cholecalciferol (CVS D3) 125 MCG (5000 UT) capsule TAKE 1 CAPSULE BY MOUTH EVERY DAY 30 capsule 3    famotidine (PEPCID) 40 MG tablet TAKE 1 TABLET BY MOUTH EVERY DAY 90 tablet 3    fluticasone (FLONASE) 50 mcg/act nasal spray 2 sprays into each nostril daily 15 8 g 5    gabapentin (NEURONTIN) 300 mg capsule TAKE 1 CAPSULE BY MOUTH THREE TIMES A DAY 90 capsule 3    lisinopril (ZESTRIL) 20 mg tablet TAKE 1 TABLET BY MOUTH EVERY DAY 90 tablet 1    potassium chloride (MICRO-K) 10 MEQ CR capsule Take 2 capsules (20 mEq total) by mouth daily 180 capsule 3    spironolactone (ALDACTONE) 25 mg tablet Take 1 tablet (25 mg total) by mouth daily 90 tablet 3    SYNTHROID 125 MCG tablet Take 1 tablet (125 mcg total) by mouth daily 90 tablet 3     No current facility-administered medications for this visit  Allergies: Allergies   Allergen Reactions    Morphine GI Intolerance     Reaction Date: 29Aug2011;     Escitalopram Rash     Reaction Date: 29Aug2011;       Physical Exam:     /74   Pulse 82   Temp (!) 97 3 °F (36 3 °C)   Ht 5' 5" (1 651 m)   Wt 77 8 kg (171 lb 9 6 oz)   SpO2 98%   BMI 28 56 kg/m²       BMI Counseling: Body mass index is 28 56 kg/m²   The BMI is above normal  Nutrition recommendations include reducing portion sizes and moderation in carbohydrate intake  Physical Exam  Vitals signs and nursing note reviewed  Constitutional:       General: She is not in acute distress  Appearance: Normal appearance  She is not ill-appearing or diaphoretic  HENT:      Head: Normocephalic and atraumatic  Right Ear: Tympanic membrane, ear canal and external ear normal       Left Ear: Tympanic membrane, ear canal and external ear normal       Nose: Nose normal  No congestion or rhinorrhea  Mouth/Throat:      Mouth: Mucous membranes are moist       Pharynx: Oropharynx is clear  No posterior oropharyngeal erythema  Eyes:      General:         Right eye: No discharge  Left eye: No discharge  Extraocular Movements: Extraocular movements intact  Conjunctiva/sclera: Conjunctivae normal       Pupils: Pupils are equal, round, and reactive to light  Neck:      Musculoskeletal: Normal range of motion and neck supple  No neck rigidity  Vascular: No carotid bruit  Cardiovascular:      Rate and Rhythm: Normal rate and regular rhythm  Pulses: Normal pulses  Heart sounds: Normal heart sounds  No murmur  Pulmonary:      Effort: Pulmonary effort is normal  No respiratory distress  Breath sounds: Normal breath sounds  No wheezing or rhonchi  Abdominal:      General: Abdomen is flat  Bowel sounds are normal  There is no distension  Palpations: There is no mass  Tenderness: There is no abdominal tenderness  Musculoskeletal: Normal range of motion  General: No swelling or deformity  Right lower leg: No edema  Left lower leg: No edema  Lymphadenopathy:      Cervical: No cervical adenopathy  Skin:     General: Skin is warm and dry  Capillary Refill: Capillary refill takes less than 2 seconds  Coloration: Skin is not jaundiced  Findings: No bruising, erythema or rash     Neurological:      General: No focal deficit present  Mental Status: She is alert and oriented to person, place, and time  Cranial Nerves: No cranial nerve deficit  Sensory: No sensory deficit  Gait: Gait normal       Deep Tendon Reflexes: Reflexes normal    Psychiatric:         Mood and Affect: Mood normal          Behavior: Behavior normal          Thought Content:  Thought content normal          Judgment: Judgment normal           Junaid Lira, [id number] [address]

## 2021-01-25 NOTE — PROGRESS NOTES
Patient ID: Alisson Chavez is a 60 y o  female  HPI: 60 y  o female presents for follow up hypertension and hypothyroidism  She complains of some tatigue and would like her tsh checked  Pt denies any dizziness,  chest pain, palpitations, or dypsnea with exertion  She would like ot go back on meds for weight loss        SUBJECTIVE    Family History   Problem Relation Age of Onset    No Known Problems Mother     Diabetes Father         Mellitus    Heart disease Father     Hypertension Father      Social History     Socioeconomic History    Marital status: /Civil Union     Spouse name: Not on file    Number of children: Not on file    Years of education: Not on file    Highest education level: Not on file   Occupational History    Not on file   Social Needs    Financial resource strain: Not on file    Food insecurity     Worry: Not on file     Inability: Not on file   [employer] needs     Medical: Not on file     Non-medical: Not on file   Tobacco Use    Smoking status: Never Smoker    Smokeless tobacco: Former User   Substance and Sexual Activity    Alcohol use: No    Drug use: No    Sexual activity: Not on file   Lifestyle    Physical activity     Days per week: Not on file     Minutes per session: Not on file    Stress: Not on file   Relationships    Social connections     Talks on phone: Not on file     Gets together: Not on file     Attends Yazdanism service: Not on file     Active member of club or organization: Not on file     Attends meetings of clubs or organizations: Not on file     Relationship status: Not on file    Intimate partner violence     Fear of current or ex partner: Not on file     Emotionally abused: Not on file     Physically abused: Not on file     Forced sexual activity: Not on file   Other Topics Concern    Not on file   Social History Narrative    Denied: History of daily coffee consumption    Daily cola consumption     Past Medical History: Diagnosis Date    Disease of thyroid gland     Hypertension      Past Surgical History:   Procedure Laterality Date     SECTION      [place] LAP,CHOLECYSTECTOMY N/A 11/15/2017    Procedure: CHOLECYSTECTOMY LAPAROSCOPIC, umbilical hernia repair;  Surgeon: Huma Rowley MD;  Location[facility];  Service: General    TONSILLECTOMY       Allergies   Allergen Reactions    Morphine GI Intolerance     Reaction Date: 2011;     Escitalopram Rash     Reaction Date: 2011;        Current Outpatient Medications:     Advair Diskus 250-50 MCG/DOSE inhaler, INHALE 1 PUFF 2 (TWO) TIMES A DAY RINSE MOUTH AFTER USE , Disp: 1 Inhaler, Rfl: 1    amLODIPine (NORVASC) 5 mg tablet, TAKE 1 AND 1/2 TABLET DAILY, Disp: 135 tablet, Rfl: 1    Ascorbic Acid (VITAMIN C) 500 MG CAPS, Take by mouth, Disp: , Rfl:     Cetirizine HCl (ZYRTEC ALLERGY) 10 MG CAPS, Take by mouth, Disp: , Rfl:     Cholecalciferol (CVS D3) 125 MCG (5000 UT) capsule, TAKE 1 CAPSULE BY MOUTH EVERY DAY, Disp: 30 capsule, Rfl: 3    famotidine (PEPCID) 40 MG tablet, TAKE 1 TABLET BY MOUTH EVERY DAY, Disp: 90 tablet, Rfl: 3    fluticasone (FLONASE) 50 mcg/act nasal spray, 2 sprays into each nostril daily, Disp: 15 8 g, Rfl: 5    gabapentin (NEURONTIN) 300 mg capsule, TAKE 1 CAPSULE BY MOUTH THREE TIMES A DAY, Disp: 90 capsule, Rfl: 3    lisinopril (ZESTRIL) 20 mg tablet, Take 1 tablet (20 mg total) by mouth daily, Disp: 90 tablet, Rfl: 1    potassium chloride (MICRO-K) 10 MEQ CR capsule, Take 2 capsules (20 mEq total) by mouth daily, Disp: 180 capsule, Rfl: 3    spironolactone (ALDACTONE) 25 mg tablet, Take 1 tablet (25 mg total) by mouth daily, Disp: 90 tablet, Rfl: 3    SYNTHROID 125 MCG tablet, Take 1 tablet (125 mcg total) by mouth daily, Disp: 90 tablet, Rfl: 3    Phentermine-Topiramate 7 5-46 MG CP24, Take 1 tablet by mouth daily, Disp: 30 capsule, Rfl: 2    Review of Systems  Constitutional:     Denies fever, chills ,weakness ,weight loss, weight gain + fatigue    ENT: Denies earache ,loss of hearing ,nosebleed, nasal discharge,nasal congestion ,sore throat ,hoarseness  Pulmonary: Denies shortness of breath ,cough  ,dyspnea on exertion, orthopnea  ,PND   Cardiovascular:  Denies bradycardia , tachycardia  ,palpations, lower extremity edema leg, claudication  Breast:  Denies new or changing breast lumps ,nipple discharge ,nipple changes  Abdomen:  Denies abdominal pain , anorexia , indigestion, nausea, vomiting, constipation, diarrhea  Musculoskeletal: Denies myalgias, arthralgias, joint swelling, joint stiffness , limb pain, limb swelling  Gu: denies dysuria, polyuria  Skin: Denies skin rash, skin lesion, skin wound, itching, dry skin  Neuro: Denies headache, numbness, tingling, confusion, loss of consciousness, dizziness, vertigo  Psychiatric: Denies feelings of depression, suicidal ideation, anxiety, sleep disturbances    OBJECTIVE  /74   Pulse 82   Temp (!) 97 3 °F (36 3 °C)   Ht 5' 5" (1 651 m)   Wt 77 8 kg (171 lb 9 6 oz)   SpO2 98%   BMI 28 56 kg/m²   Constitutional:   NAD, well appearing and well nourished      ENT:   Conjunctiva and lids: no injection, edema, or discharge     Pupils and iris: ELAINE bilaterally    External inspection of ears and nose: normal without deformities or discharge  Otoscopic exam: Canals patent without erythema  Nasal mucosa, septum and turbinates: Normal or edema or discharge         Oropharynx:  Moist mucosa, normal tongue and tonsils without lesions  No erythema        Pulmonary:Respiratory effort normal rate and rhythm, no increased work of breathing   Auscultation of lungs:  Clear bilaterally with no adventitious breath sounds       Cardiovascular: regular rate and rhythm, S1 and S2, no murmur, no edema and/or varicosities of LE      Abdomen: Soft and non-distended     Positive bowel sounds      No heptomegaly or splenomegaly      Gu: no suprapubic tenderness or CVA tenderness, no urethral discharge  Lymphatic:  No anterior or posterior cervical lymphadenopathy         Musculoskeletal:  Gait and station: Normal gait      Digits and nails normal without clubbing or cyanosis       Inspection/palpation of joints, bones, and muscles:  No joint tenderness, swelling, full active and passive range of motion       Skin: Normal skin turgor and no rashes      Neuro:      Normal reflexes     Psych:   alert and oriented to person, place and time   normal mood and affect       Assessment/Plan:Diagnoses and all orders for this visit:    Annual physical exam    Essential hypertension  -     lisinopril (ZESTRIL) 20 mg tablet; Take 1 tablet (20 mg total) by mouth daily    Acquired hypothyroidism    Other fatigue  -     TSH, 3rd generation; Future    Increased BMI  -     Phentermine-Topiramate 7 5-46 MG CP24; Take 1 tablet by mouth daily        Reviewed with patient plan to treat with above plan      Patient instructed to call in 72 hours if not feeling better or if symptoms worse

## 2021-01-28 ENCOUNTER — LAB (OUTPATIENT)
Dept: LAB | Facility: AMBULARY SURGERY CENTER | Age: 66
End: 2021-01-28
Payer: COMMERCIAL

## 2021-01-28 DIAGNOSIS — E78.00 HYPERCHOLESTEROLEMIA: ICD-10-CM

## 2021-01-28 DIAGNOSIS — E55.9 VITAMIN D DEFICIENCY: ICD-10-CM

## 2021-01-28 DIAGNOSIS — R53.83 OTHER FATIGUE: ICD-10-CM

## 2021-01-28 DIAGNOSIS — I10 ESSENTIAL HYPERTENSION: ICD-10-CM

## 2021-01-28 LAB
25(OH)D3 SERPL-MCNC: 74.1 NG/ML (ref 30–100)
ALBUMIN SERPL BCP-MCNC: 3.8 G/DL (ref 3.5–5)
ALP SERPL-CCNC: 143 U/L (ref 46–116)
ALT SERPL W P-5'-P-CCNC: 20 U/L (ref 12–78)
ANION GAP SERPL CALCULATED.3IONS-SCNC: 4 MMOL/L (ref 4–13)
AST SERPL W P-5'-P-CCNC: 18 U/L (ref 5–45)
BILIRUB SERPL-MCNC: 0.67 MG/DL (ref 0.2–1)
BUN SERPL-MCNC: 19 MG/DL (ref 5–25)
CALCIUM SERPL-MCNC: 9.5 MG/DL (ref 8.3–10.1)
CHLORIDE SERPL-SCNC: 108 MMOL/L (ref 100–108)
CHOLEST SERPL-MCNC: 189 MG/DL (ref 50–200)
CO2 SERPL-SCNC: 30 MMOL/L (ref 21–32)
CREAT SERPL-MCNC: 0.92 MG/DL (ref 0.6–1.3)
GFR SERPL CREATININE-BSD FRML MDRD: 66 ML/MIN/1.73SQ M
GLUCOSE P FAST SERPL-MCNC: 95 MG/DL (ref 65–99)
HDLC SERPL-MCNC: 53 MG/DL
LDLC SERPL CALC-MCNC: 122 MG/DL (ref 0–100)
POTASSIUM SERPL-SCNC: 4 MMOL/L (ref 3.5–5.3)
PROT SERPL-MCNC: 7.6 G/DL (ref 6.4–8.2)
SODIUM SERPL-SCNC: 142 MMOL/L (ref 136–145)
TRIGL SERPL-MCNC: 72 MG/DL
TSH SERPL DL<=0.05 MIU/L-ACNC: 0.38 UIU/ML (ref 0.36–3.74)

## 2021-01-28 PROCEDURE — 80053 COMPREHEN METABOLIC PANEL: CPT

## 2021-01-28 PROCEDURE — 80061 LIPID PANEL: CPT

## 2021-01-28 PROCEDURE — 84443 ASSAY THYROID STIM HORMONE: CPT

## 2021-01-28 PROCEDURE — 82306 VITAMIN D 25 HYDROXY: CPT

## 2021-01-28 PROCEDURE — 36415 COLL VENOUS BLD VENIPUNCTURE: CPT

## 2021-02-19 DIAGNOSIS — R42 DIZZINESS: Primary | ICD-10-CM

## 2021-02-19 RX ORDER — MECLIZINE HYDROCHLORIDE 25 MG/1
25 TABLET ORAL 3 TIMES DAILY PRN
Qty: 30 TABLET | Refills: 0 | Status: SHIPPED | OUTPATIENT
Start: 2021-02-19 | End: 2021-10-22 | Stop reason: SDUPTHER

## 2021-03-05 ENCOUNTER — TELEPHONE (OUTPATIENT)
Dept: FAMILY MEDICINE CLINIC | Facility: CLINIC | Age: 66
End: 2021-03-05

## 2021-03-05 NOTE — TELEPHONE ENCOUNTER
----- Message from Bety Whiteside sent at 3/5/2021 12:44 PM EST -----  Regarding: FW: Non-Urgent Medical Question  Contact: [phone]    ----- Message -----  From: Glen García  Sent: 3/5/2021  11:56 AM EST  To: [facility]  Subject: Non-Urgent Dell Benedict:'I just want to let you know that I finally did the cologuard and sent it out  If it comes back positive I would like to redo it if possible because Pia Dill had the box on the floor in the corner which was by the heat  It says not to put by the heat  He says that the box did not get hot but who knows  The other thing is that it went out one day and the next day it was sitting at my front door  I do not know why but hopefully it arrives on time to the lab  I resent it  My name was on it as the mailing person  Very strange--so hopefully all is well but just in case it does come back not good I would like to repeat it if possible  Thanks so much and the next time I will do it right away  Chica Luis by te way the people for the [employer] said that the doctor has to call in for the Rx  I am going to try calling one more time    Elvis Segovia

## 2021-03-09 DIAGNOSIS — I10 ESSENTIAL HYPERTENSION: ICD-10-CM

## 2021-03-09 RX ORDER — AMLODIPINE BESYLATE 5 MG/1
TABLET ORAL
Qty: 135 TABLET | Refills: 1 | Status: SHIPPED | OUTPATIENT
Start: 2021-03-09 | End: 2021-08-30

## 2021-03-22 ENCOUNTER — TELEMEDICINE (OUTPATIENT)
Dept: FAMILY MEDICINE CLINIC | Facility: CLINIC | Age: 66
End: 2021-03-22
Payer: COMMERCIAL

## 2021-03-22 DIAGNOSIS — J01.90 ACUTE SINUSITIS, RECURRENCE NOT SPECIFIED, UNSPECIFIED LOCATION: ICD-10-CM

## 2021-03-22 PROCEDURE — 99213 OFFICE O/P EST LOW 20 MIN: CPT | Performed by: FAMILY MEDICINE

## 2021-03-22 PROCEDURE — 1036F TOBACCO NON-USER: CPT | Performed by: FAMILY MEDICINE

## 2021-03-22 RX ORDER — CEFUROXIME AXETIL 500 MG/1
500 TABLET ORAL EVERY 12 HOURS SCHEDULED
Qty: 20 TABLET | Refills: 0 | Status: SHIPPED | OUTPATIENT
Start: 2021-03-22 | End: 2021-04-01

## 2021-03-29 NOTE — PROGRESS NOTES
Virtual Regular Visit      Assessment/Plan:    Problem List Items Addressed This Visit     None      Visit Diagnoses     Acute sinusitis, recurrence not specified, unspecified location    -  Primary    Relevant Medications    cefuroxime (CEFTIN) 500 mg tablet               Reason for visit is for sinus pain, pressure and nasal congestion  She has ear pain , pressure and is unable to blow out any secretions  Encounter provider Odalis Barrett DO    Provider located at [address] [place] [id number]      Recent Visits  No visits were found meeting these conditions  Showing recent visits within past 7 days and meeting all other requirements     Future Appointments  No visits were found meeting these conditions  Showing future appointments within next 150 days and meeting all other requirements        The patient was identified by name and date of birth  Maria Luisa Berg was informed that this is a telemedicine visit and that the visit is being conducted through [employer] and patient was informed that this is a secure, HIPAA-compliant platform  She agrees to proceed     My office door was closed  No one else was in the room  She acknowledged consent and understanding of privacy and security of the video platform  The patient has agreed to participate and understands they can discontinue the visit at any time  Patient is aware this is a billable service  Subjective  Maria Luisa Berg is a 60 y o  female as above          HPI     Past Medical History:   Diagnosis Date    Disease of thyroid gland     Hypertension        Past Surgical History:   Procedure Laterality Date     SECTION      [place] LAP,CHOLECYSTECTOMY N/A 11/15/2017    Procedure: CHOLECYSTECTOMY LAPAROSCOPIC, umbilical hernia repair;  Surgeon: Jerry Medina MD;  [facility]: [facility];  Service: General    TONSILLECTOMY         Current Outpatient Medications   Medication Sig Dispense Refill    Advair Diskus 250-50 MCG/DOSE inhaler INHALE 1 PUFF 2 (TWO) TIMES A DAY RINSE MOUTH AFTER USE  1 Inhaler 1    amLODIPine (NORVASC) 5 mg tablet TAKE 1 AND 1/2 TABLET BY MOUTH DAILY 135 tablet 1    Ascorbic Acid (VITAMIN C) 500 MG CAPS Take by mouth      cefuroxime (CEFTIN) 500 mg tablet Take 1 tablet (500 mg total) by mouth every 12 (twelve) hours for 10 days 20 tablet 0    Cetirizine HCl (ZYRTEC ALLERGY) 10 MG CAPS Take by mouth      Cholecalciferol (CVS D3) 125 MCG (5000 UT) capsule TAKE 1 CAPSULE BY MOUTH EVERY DAY 30 capsule 3    famotidine (PEPCID) 40 MG tablet TAKE 1 TABLET BY MOUTH EVERY DAY 90 tablet 3    fluticasone (FLONASE) 50 mcg/act nasal spray 2 sprays into each nostril daily 15 8 g 5    gabapentin (NEURONTIN) 300 mg capsule TAKE 1 CAPSULE BY MOUTH THREE TIMES A DAY 90 capsule 3    lisinopril (ZESTRIL) 20 mg tablet Take 1 tablet (20 mg total) by mouth daily 90 tablet 1    meclizine (ANTIVERT) 25 mg tablet Take 1 tablet (25 mg total) by mouth 3 (three) times a day as needed for dizziness 30 tablet 0    Phentermine-Topiramate 7 5-46 MG CP24 Take 1 tablet by mouth daily 30 capsule 2    potassium chloride (MICRO-K) 10 MEQ CR capsule Take 2 capsules (20 mEq total) by mouth daily 180 capsule 3    spironolactone (ALDACTONE) 25 mg tablet Take 1 tablet (25 mg total) by mouth daily 90 tablet 3    SYNTHROID 125 MCG tablet Take 1 tablet (125 mcg total) by mouth daily 90 tablet 3     No current facility-administered medications for this visit  Allergies   Allergen Reactions    Morphine GI Intolerance     Reaction Date: 29Aug2011;     Escitalopram Rash     Reaction Date: 29Aug2011;        Review of Systems   HENT: Positive for congestion, ear pain, sinus pressure and sinus pain  Respiratory: Negative for cough and wheezing  Neurological: Positive for headaches  All other systems reviewed and are negative        Video Exam    There were no vitals filed for this visit     Physical Exam  Constitutional:       Appearance: Normal appearance  She is not ill-appearing  Eyes:      General:         Right eye: No discharge  Left eye: No discharge  Pulmonary:      Effort: No respiratory distress  Neurological:      Mental Status: She is alert and oriented to person, place, and time  Mental status is at baseline  Psychiatric:         Mood and Affect: Mood normal          Behavior: Behavior normal          Thought Content: Thought content normal          Judgment: Judgment normal           I spent 15 minutes directly with the patient during this visit      Zaheer Mcdaniel acknowledges that she has consented to an online visit or consultation  She understands that the online visit is based solely on information provided by her, and that, in the absence of a face-to-face physical evaluation by the physician, the diagnosis she receives is both limited and provisional in terms of accuracy and completeness  This is not intended to replace a full medical face-to-face evaluation by the physician  Shavon Musa understands and accepts these terms

## 2021-04-14 DIAGNOSIS — E03.9 HYPOTHYROIDISM, UNSPECIFIED TYPE: ICD-10-CM

## 2021-04-14 RX ORDER — LEVOTHYROXINE SODIUM 125 UG/1
TABLET ORAL
Qty: 90 TABLET | Refills: 3 | Status: SHIPPED | OUTPATIENT
Start: 2021-04-14 | End: 2021-09-01

## 2021-04-21 ENCOUNTER — TELEPHONE (OUTPATIENT)
Dept: FAMILY MEDICINE CLINIC | Facility: CLINIC | Age: 66
End: 2021-04-21

## 2021-04-27 DIAGNOSIS — M54.50 BACK PAIN, LUMBOSACRAL: ICD-10-CM

## 2021-04-27 RX ORDER — GABAPENTIN 300 MG/1
CAPSULE ORAL
Qty: 90 CAPSULE | Refills: 3 | Status: SHIPPED | OUTPATIENT
Start: 2021-04-27 | End: 2021-12-06

## 2021-05-14 DIAGNOSIS — K21.9 GASTROESOPHAGEAL REFLUX DISEASE WITHOUT ESOPHAGITIS: ICD-10-CM

## 2021-05-14 RX ORDER — FAMOTIDINE 40 MG/1
TABLET, FILM COATED ORAL
Qty: 90 TABLET | Refills: 3 | Status: SHIPPED | OUTPATIENT
Start: 2021-05-14 | End: 2022-05-31

## 2021-05-19 DIAGNOSIS — R60.9 EDEMA, UNSPECIFIED TYPE: ICD-10-CM

## 2021-05-19 RX ORDER — SPIRONOLACTONE 25 MG/1
TABLET ORAL
Qty: 90 TABLET | Refills: 3 | Status: SHIPPED | OUTPATIENT
Start: 2021-05-19

## 2021-05-21 ENCOUNTER — OFFICE VISIT (OUTPATIENT)
Dept: FAMILY MEDICINE CLINIC | Facility: CLINIC | Age: 66
End: 2021-05-21
Payer: COMMERCIAL

## 2021-05-21 VITALS
WEIGHT: 169 LBS | DIASTOLIC BLOOD PRESSURE: 80 MMHG | SYSTOLIC BLOOD PRESSURE: 122 MMHG | OXYGEN SATURATION: 97 % | HEIGHT: 65 IN | HEART RATE: 83 BPM | TEMPERATURE: 99.4 F | BODY MASS INDEX: 28.16 KG/M2

## 2021-05-21 DIAGNOSIS — J30.9 ALLERGIC RHINITIS, UNSPECIFIED SEASONALITY, UNSPECIFIED TRIGGER: Primary | ICD-10-CM

## 2021-05-21 PROCEDURE — 3079F DIAST BP 80-89 MM HG: CPT | Performed by: FAMILY MEDICINE

## 2021-05-21 PROCEDURE — 1036F TOBACCO NON-USER: CPT | Performed by: FAMILY MEDICINE

## 2021-05-21 PROCEDURE — 3008F BODY MASS INDEX DOCD: CPT | Performed by: FAMILY MEDICINE

## 2021-05-21 PROCEDURE — 3074F SYST BP LT 130 MM HG: CPT | Performed by: FAMILY MEDICINE

## 2021-05-21 PROCEDURE — 99213 OFFICE O/P EST LOW 20 MIN: CPT | Performed by: FAMILY MEDICINE

## 2021-05-21 RX ORDER — AZELASTINE 1 MG/ML
1 SPRAY, METERED NASAL 2 TIMES DAILY
Qty: 1 ML | Refills: 3 | Status: SHIPPED | OUTPATIENT
Start: 2021-05-21 | End: 2021-08-16

## 2021-05-25 DIAGNOSIS — R60.9 EDEMA, UNSPECIFIED TYPE: ICD-10-CM

## 2021-05-25 RX ORDER — POTASSIUM CHLORIDE 750 MG/1
20 CAPSULE, EXTENDED RELEASE ORAL DAILY
Qty: 180 CAPSULE | Refills: 3 | Status: SHIPPED | OUTPATIENT
Start: 2021-05-25 | End: 2022-05-31

## 2021-05-27 NOTE — PROGRESS NOTES
Patient ID: Tamie Drpaer is a 60 y o  female  HPI: 60 y  o female presenting with symptoms of sinus pain,pressure, nasal congestion, pnd dry cough , ear and throat pain        SUBJECTIVE    Family History   Problem Relation Age of Onset    No Known Problems Mother     Diabetes Father         Mellitus    Heart disease Father     Hypertension Father      Social History     Socioeconomic History    Marital status: /Civil Union     Spouse name: Not on file    Number of children: Not on file    Years of education: Not on file    Highest education level: Not on file   Occupational History    Not on file   Social Needs    Financial resource strain: Not on file    Food insecurity     Worry: Not on file     Inability: Not on file   [employer] needs     Medical: Not on file     Non-medical: Not on file   Tobacco Use    Smoking status: Never Smoker    Smokeless tobacco: Former User   Substance and Sexual Activity    Alcohol use: No    Drug use: No    Sexual activity: Not on file   Lifestyle    Physical activity     Days per week: Not on file     Minutes per session: Not on file    Stress: Not on file   Relationships    Social connections     Talks on phone: Not on file     Gets together: Not on file     Attends Hindu service: Not on file     Active member of club or organization: Not on file     Attends meetings of clubs or organizations: Not on file     Relationship status: Not on file    Intimate partner violence     Fear of current or ex partner: Not on file     Emotionally abused: Not on file     Physically abused: Not on file     Forced sexual activity: Not on file   Other Topics Concern    Not on file   Social History Narrative    Denied: History of daily coffee consumption    Daily cola consumption     Past Medical History:   Diagnosis Date    Disease of thyroid gland     Hypertension      Past Surgical History:   Procedure Laterality Date     SECTION      [place] LAP,CHOLECYSTECTOMY N/A 11/15/2017    Procedure: CHOLECYSTECTOMY LAPAROSCOPIC, umbilical hernia repair;  Surgeon: Tanja Hendrix MD;  [facility];  Service: General    TONSILLECTOMY       Allergies   Allergen Reactions    Morphine GI Intolerance     Reaction Date: 29Aug2011;     Escitalopram Rash     Reaction Date: 29Aug2011;        Current Outpatient Medications:     Advair Diskus 250-50 MCG/DOSE inhaler, INHALE 1 PUFF 2 (TWO) TIMES A DAY RINSE MOUTH AFTER USE , Disp: 1 Inhaler, Rfl: 1    amLODIPine (NORVASC) 5 mg tablet, TAKE 1 AND 1/2 TABLET BY MOUTH DAILY, Disp: 135 tablet, Rfl: 1    Ascorbic Acid (VITAMIN C) 500 MG CAPS, Take by mouth, Disp: , Rfl:     Cetirizine HCl (ZYRTEC ALLERGY) 10 MG CAPS, Take by mouth, Disp: , Rfl:     Cholecalciferol (CVS D3) 125 MCG (5000 UT) capsule, TAKE 1 CAPSULE BY MOUTH EVERY DAY, Disp: 30 capsule, Rfl: 3    famotidine (PEPCID) 40 MG tablet, TAKE 1 TABLET BY MOUTH EVERY DAY, Disp: 90 tablet, Rfl: 3    fluticasone (FLONASE) 50 mcg/act nasal spray, 2 sprays into each nostril daily, Disp: 15 8 g, Rfl: 5    gabapentin (NEURONTIN) 300 mg capsule, TAKE 1 CAPSULE BY MOUTH THREE TIMES A DAY, Disp: 90 capsule, Rfl: 3    lisinopril (ZESTRIL) 20 mg tablet, Take 1 tablet (20 mg total) by mouth daily, Disp: 90 tablet, Rfl: 1    meclizine (ANTIVERT) 25 mg tablet, Take 1 tablet (25 mg total) by mouth 3 (three) times a day as needed for dizziness, Disp: 30 tablet, Rfl: 0    spironolactone (ALDACTONE) 25 mg tablet, TAKE 1 TABLET BY MOUTH EVERY DAY, Disp: 90 tablet, Rfl: 3    Synthroid 125 MCG tablet, TAKE 1 TABLET BY MOUTH EVERY DAY, Disp: 90 tablet, Rfl: 3    azelastine (ASTELIN) 0 1 % nasal spray, 1 spray into each nostril 2 (two) times a day Use in each nostril as directed, Disp: 1 mL, Rfl: 3    potassium chloride (MICRO-K) 10 MEQ CR capsule, TAKE 2 CAPSULES (20 MEQ TOTAL) BY MOUTH DAILY, Disp: 180 capsule, Rfl: 3    Review of Systems  Constitutional:     Denies fever, chills, fatigue, weakness ,weight loss, weight gain      ENT: Denies earache, loss of hearing, nosebleed, nasal discharge,but complains of nasal congestion, sore throat,hoarseness and sinus pain and pressure    Pulmonary: Denies shortness of breath ,cough , dyspnea on exertionon, orthopnea ,+ PND   Cardiovascular:  Denies bradycardia , tachycardia ,palpations, lower extremity, edema leg, claudication  Breast:  Denies new or changing breast lumps,  nipple discharge, nipple changes,  Abdomen:  Denies abdominal pain , anorexia ,indigestion, nausea ,vomiting, constipation , diarrhea  Musculoskeletal: Denies myalgias, arthralgias, joint swelling, joint stiffness ,limb pain, limb swelling  Lymph:+ swollen glands  Gu: no dysuria or urinary frequency  Skin: Denies skin rash, skin lesion, skin wound, itching,dry skin  Neuro: Denies headache, numbness, tingling, confusion, loss of consciousness, dizziness ,vertigo  Psychiatric: Denies feelings of depression, suicidal ideation, anxiety, sleep disturbances    OBJECTIVE  /80   Pulse 83   Temp 99 4 °F (37 4 °C)   Ht 5' 5" (1 651 m)   Wt 76 7 kg (169 lb)   SpO2 97%   BMI 28 12 kg/m²   Constitutional:   NAD, well appearing and well nourished      ENT:   Conjunctiva and lids: no injection, edema, or discharge    Pupils and iris: ELAINE bilaterally   External inspection of ears and nose: normal without deformities or discharge  Otoscopic exam: Canals patent ; tm are dull, without  erythema or  effusions  ENasal mucosa, septum and turbinates: +Turbinae injection without discharge   Oropharynx:  Moist mucosa, normal tongue and tonsils without lesions  no Erythema and injection  of post pharynx +pnd      Pulmonary:Respiratory effort normal rate and rhythm, no increased work of breathing   Auscultation of lungs:  Clear bilaterally with no adventitious breath sounds       Cardiovascular: regular rate and rhythm, S1 and S2, no murmur, no edema and/or varicosities of LE Abdomen: Soft and non-distended    Positive bowel sounds    No heptomegaly or splenomegaly    Lymphatic: Anterior  cervical lymphadenopathy         Muscskeletal:  Gait and station: Normal gait     Digits and nails normal without clubbing or cyanosis     Inspection/palpation of joints, bones, and muscles:  No joint tenderness, swelling, full active and passive range of motion      Gu: no suprabubic tenderness, CVA tenderness or urethral discharge  Skin: Normal skin turgor and no rashes    Neuro:    Normal reflexes   Psych:   alert and oriented to person, place and time  normal mood and affect      Assessment/Plan:Diagnoses and all orders for this visit:    Allergic rhinitis, unspecified seasonality, unspecified trigger  Comments:  Continue with daily antihistamine  Orders:  -     azelastine (ASTELIN) 0 1 % nasal spray; 1 spray into each nostril 2 (two) times a day Use in each nostril as directed        Reviewed with patient plan to treat with above plan      Patient instructed to call in 72 hours if not feeling better or if symptoms worsen

## 2021-06-24 ENCOUNTER — VBI (OUTPATIENT)
Dept: ADMINISTRATIVE | Facility: OTHER | Age: 66
End: 2021-06-24

## 2021-06-26 DIAGNOSIS — I10 ESSENTIAL HYPERTENSION: ICD-10-CM

## 2021-06-28 RX ORDER — LISINOPRIL 20 MG/1
TABLET ORAL
Qty: 90 TABLET | Refills: 1 | Status: SHIPPED | OUTPATIENT
Start: 2021-06-28 | End: 2021-12-06

## 2021-07-26 ENCOUNTER — PREPPED CHART (OUTPATIENT)
Dept: URBAN - METROPOLITAN AREA CLINIC 6 | Facility: CLINIC | Age: 66
End: 2021-07-26

## 2021-08-15 DIAGNOSIS — J30.9 ALLERGIC RHINITIS, UNSPECIFIED SEASONALITY, UNSPECIFIED TRIGGER: ICD-10-CM

## 2021-08-16 RX ORDER — AZELASTINE 1 MG/ML
1 SPRAY, METERED NASAL 2 TIMES DAILY
Qty: 1 ML | Refills: 1 | Status: SHIPPED | OUTPATIENT
Start: 2021-08-16 | End: 2021-10-12

## 2021-08-19 ENCOUNTER — RA CDI HCC (OUTPATIENT)
Dept: OTHER | Facility: HOSPITAL | Age: 66
End: 2021-08-19

## 2021-08-19 NOTE — PROGRESS NOTES
[facility]  coding opportunities       Chart reviewed, no opportunity found: CHART REVIEWED, NO OPPORTUNITY FOUND                        Patients insurance company: [email or web address] (Medicare and Commercial for [employer] and [employer])

## 2021-08-30 ENCOUNTER — OFFICE VISIT (OUTPATIENT)
Dept: FAMILY MEDICINE CLINIC | Facility: CLINIC | Age: 66
End: 2021-08-30
Payer: MEDICARE

## 2021-08-30 VITALS
HEART RATE: 79 BPM | BODY MASS INDEX: 28.12 KG/M2 | DIASTOLIC BLOOD PRESSURE: 80 MMHG | OXYGEN SATURATION: 98 % | SYSTOLIC BLOOD PRESSURE: 118 MMHG | HEIGHT: 65 IN | TEMPERATURE: 98.2 F

## 2021-08-30 DIAGNOSIS — Z12.31 BREAST CANCER SCREENING BY MAMMOGRAM: ICD-10-CM

## 2021-08-30 DIAGNOSIS — E78.00 HYPERCHOLESTEROLEMIA: ICD-10-CM

## 2021-08-30 DIAGNOSIS — I10 ESSENTIAL HYPERTENSION: Primary | ICD-10-CM

## 2021-08-30 DIAGNOSIS — Z78.0 ASYMPTOMATIC POSTMENOPAUSAL ESTROGEN DEFICIENCY: ICD-10-CM

## 2021-08-30 DIAGNOSIS — Z23 NEED FOR VACCINATION: ICD-10-CM

## 2021-08-30 DIAGNOSIS — E03.9 HYPOTHYROIDISM, UNSPECIFIED TYPE: ICD-10-CM

## 2021-08-30 PROCEDURE — G0009 ADMIN PNEUMOCOCCAL VACCINE: HCPCS | Performed by: FAMILY MEDICINE

## 2021-08-30 PROCEDURE — 90732 PPSV23 VACC 2 YRS+ SUBQ/IM: CPT | Performed by: FAMILY MEDICINE

## 2021-08-30 PROCEDURE — 99214 OFFICE O/P EST MOD 30 MIN: CPT | Performed by: FAMILY MEDICINE

## 2021-08-31 ENCOUNTER — APPOINTMENT (OUTPATIENT)
Dept: LAB | Facility: CLINIC | Age: 66
End: 2021-08-31
Payer: MEDICARE

## 2021-08-31 DIAGNOSIS — E78.00 HYPERCHOLESTEROLEMIA: ICD-10-CM

## 2021-08-31 DIAGNOSIS — I10 ESSENTIAL HYPERTENSION: ICD-10-CM

## 2021-08-31 DIAGNOSIS — E03.9 HYPOTHYROIDISM, UNSPECIFIED TYPE: ICD-10-CM

## 2021-08-31 LAB
ALBUMIN SERPL BCP-MCNC: 3.4 G/DL (ref 3.5–5)
ALP SERPL-CCNC: 96 U/L (ref 46–116)
ALT SERPL W P-5'-P-CCNC: 22 U/L (ref 12–78)
ANION GAP SERPL CALCULATED.3IONS-SCNC: 4 MMOL/L (ref 4–13)
AST SERPL W P-5'-P-CCNC: 14 U/L (ref 5–45)
BILIRUB SERPL-MCNC: 0.35 MG/DL (ref 0.2–1)
BUN SERPL-MCNC: 23 MG/DL (ref 5–25)
CALCIUM ALBUM COR SERPL-MCNC: 9.8 MG/DL (ref 8.3–10.1)
CALCIUM SERPL-MCNC: 9.3 MG/DL (ref 8.3–10.1)
CHLORIDE SERPL-SCNC: 107 MMOL/L (ref 100–108)
CHOLEST SERPL-MCNC: 190 MG/DL (ref 50–200)
CO2 SERPL-SCNC: 29 MMOL/L (ref 21–32)
CREAT SERPL-MCNC: 0.83 MG/DL (ref 0.6–1.3)
GFR SERPL CREATININE-BSD FRML MDRD: 74 ML/MIN/1.73SQ M
GLUCOSE P FAST SERPL-MCNC: 110 MG/DL (ref 65–99)
HDLC SERPL-MCNC: 47 MG/DL
LDLC SERPL CALC-MCNC: 125 MG/DL (ref 0–100)
POTASSIUM SERPL-SCNC: 4.2 MMOL/L (ref 3.5–5.3)
PROT SERPL-MCNC: 7.2 G/DL (ref 6.4–8.2)
SODIUM SERPL-SCNC: 140 MMOL/L (ref 136–145)
TRIGL SERPL-MCNC: 91 MG/DL
TSH SERPL DL<=0.05 MIU/L-ACNC: 0.18 UIU/ML (ref 0.36–3.74)

## 2021-08-31 PROCEDURE — 80053 COMPREHEN METABOLIC PANEL: CPT

## 2021-08-31 PROCEDURE — 84443 ASSAY THYROID STIM HORMONE: CPT

## 2021-08-31 PROCEDURE — 80061 LIPID PANEL: CPT

## 2021-08-31 PROCEDURE — 36415 COLL VENOUS BLD VENIPUNCTURE: CPT

## 2021-08-31 NOTE — PROGRESS NOTES
Patient ID: Livia Maynard is a 73 y o  female  HPI: 73 y  o female presents for follow up hypertension,hypothyroidism and hypercholesterolemia  Pt denies any dizziness,  chest pain, palpitations, or dypsnea with exertion  She is due for a bone density and a mammogram  She is also due for pneumovax 23  SUBJECTIVE    Family History   Problem Relation Age of Onset    No Known Problems Mother     Diabetes Father         Mellitus    Heart disease Father     Hypertension Father      Social History     Socioeconomic History    Marital status: /Civil Union     Spouse name: Not on file    Number of children: Not on file    Years of education: Not on file    Highest education level: Not on file   Occupational History    Not on file   Tobacco Use    Smoking status: Never Smoker    Smokeless tobacco: Former User   Substance and Sexual Activity    Alcohol use: No    Drug use: No    Sexual activity: Not on file   Other Topics Concern    Not on file   Social History Narrative    Denied: History of daily coffee consumption    Daily cola consumption     Social Determinants of Health     Financial Resource Strain:     Difficulty of Paying Living Expenses:    Food Insecurity:     Worried About Running Out of Food in the Last Year:     Ran Out of Food in the Last Year:    Transportation Needs:     Lack of Transportation (Medical):      Lack of Transportation (Non-Medical):    Physical Activity:     Days of Exercise per Week:     Minutes of Exercise per Session:    Stress:     Feeling of Stress :    Social Connections:     Frequency of Communication with Friends and Family:     Frequency of Social Gatherings with Friends and Family:     Attends Zoroastrianism Services:     Active Member of Clubs or Organizations:     Attends Club or Organization Meetings:     Marital Status:    Intimate Partner Violence:     Fear of Current or Ex-Partner:     Emotionally Abused:     Physically Abused:     Sexually Abused:      Past Medical History:   Diagnosis Date    Disease of thyroid gland     Hypertension      Past Surgical History:   Procedure Laterality Date     SECTION      CT LAP,CHOLECYSTECTOMY N/A 11/15/2017    Procedure: CHOLECYSTECTOMY LAPAROSCOPIC, umbilical hernia repair;  Surgeon: Brenda Vieyra MD;  [facility];  Service: General    TONSILLECTOMY       Allergies   Allergen Reactions    Morphine GI Intolerance     Reaction Date: 2011;     Escitalopram Rash     Reaction Date: 2011;        Current Outpatient Medications:     Advair Diskus 250-50 MCG/DOSE inhaler, INHALE 1 PUFF 2 (TWO) TIMES A DAY RINSE MOUTH AFTER USE , Disp: 1 Inhaler, Rfl: 1    Ascorbic Acid (VITAMIN C) 500 MG CAPS, Take by mouth, Disp: , Rfl:     azelastine (ASTELIN) 0 1 % nasal spray, 1 SPRAY INTO EACH NOSTRIL 2 (TWO) TIMES A DAY USE IN EACH NOSTRIL AS DIRECTED, Disp: 1 mL, Rfl: 1    Cetirizine HCl (ZYRTEC ALLERGY) 10 MG CAPS, Take by mouth, Disp: , Rfl:     Cholecalciferol (CVS D3) 125 MCG (5000 UT) capsule, TAKE 1 CAPSULE BY MOUTH EVERY DAY, Disp: 30 capsule, Rfl: 3    famotidine (PEPCID) 40 MG tablet, TAKE 1 TABLET BY MOUTH EVERY DAY, Disp: 90 tablet, Rfl: 3    fluticasone (FLONASE) 50 mcg/act nasal spray, 2 sprays into each nostril daily, Disp: 15 8 g, Rfl: 5    gabapentin (NEURONTIN) 300 mg capsule, TAKE 1 CAPSULE BY MOUTH THREE TIMES A DAY, Disp: 90 capsule, Rfl: 3    lisinopril (ZESTRIL) 20 mg tablet, TAKE 1 TABLET BY MOUTH EVERY DAY, Disp: 90 tablet, Rfl: 1    meclizine (ANTIVERT) 25 mg tablet, Take 1 tablet (25 mg total) by mouth 3 (three) times a day as needed for dizziness, Disp: 30 tablet, Rfl: 0    potassium chloride (MICRO-K) 10 MEQ CR capsule, TAKE 2 CAPSULES (20 MEQ TOTAL) BY MOUTH DAILY, Disp: 180 capsule, Rfl: 3    spironolactone (ALDACTONE) 25 mg tablet, TAKE 1 TABLET BY MOUTH EVERY DAY, Disp: 90 tablet, Rfl: 3    Synthroid 125 MCG tablet, TAKE 1 TABLET BY MOUTH EVERY DAY, Disp: 90 tablet, Rfl: 3    Review of Systems  Constitutional:     Denies fever, chills ,fatigue ,weakness ,weight loss, weight gain     ENT: Denies earache ,loss of hearing ,nosebleed, nasal discharge,nasal congestion ,sore throat ,hoarseness  Pulmonary: Denies shortness of breath ,cough  ,dyspnea on exertion, orthopnea  ,PND   Cardiovascular:  Denies bradycardia , tachycardia  ,palpations, lower extremity edema leg, claudication  Breast:  Denies new or changing breast lumps ,nipple discharge ,nipple changes  Abdomen:  Denies abdominal pain , anorexia , indigestion, nausea, vomiting, constipation, diarrhea  Musculoskeletal: Denies myalgias, arthralgias, joint swelling, joint stiffness , limb pain, limb swelling  Gu: denies dysuria, polyuria  Skin: Denies skin rash, skin lesion, skin wound, itching, dry skin  Neuro: Denies headache, numbness, tingling, confusion, loss of consciousness, dizziness, vertigo  Psychiatric: Denies feelings of depression, suicidal ideation, anxiety, sleep disturbances    OBJECTIVE  /80   Pulse 79   Temp 98 2 °F (36 8 °C)   Ht 5' 5" (1 651 m)   SpO2 98%   BMI 28 12 kg/m²   Constitutional:   NAD, well appearing and well nourished      ENT:   Conjunctiva and lids: no injection, edema, or discharge     Pupils and iris: ELAINE bilaterally    External inspection of ears and nose: normal without deformities or discharge  Otoscopic exam: Canals patent without erythema  Nasal mucosa, septum and turbinates: Normal or edema or discharge         Oropharynx:  Moist mucosa, normal tongue and tonsils without lesions  No erythema        Pulmonary:Respiratory effort normal rate and rhythm, no increased work of breathing   Auscultation of lungs:  Clear bilaterally with no adventitious breath sounds       Cardiovascular: regular rate and rhythm, S1 and S2, no murmur, no edema and/or varicosities of LE      Abdomen: Soft and non-distended     Positive bowel sounds      No heptomegaly or splenomegaly      Gu: no suprapubic tenderness or CVA tenderness, no urethral discharge  Lymphatic:  No anterior or posterior cervical lymphadenopathy         Musculoskeletal:  Gait and station: Normal gait      Digits and nails normal without clubbing or cyanosis       Inspection/palpation of joints, bones, and muscles:  No joint tenderness, swelling, full active and passive range of motion       Skin: Normal skin turgor and large skin tag on back of neck without abnormal color or shape  Neuro:       Normal reflexes       Psych:   alert and oriented to person, place and time     normal mood and affect       Assessment/Plan:Diagnoses and all orders for this visit:    Essential hypertension  -     Comprehensive metabolic panel; Future    Hypercholesterolemia  -     Lipid Panel with Direct LDL reflex; Future    Hypothyroidism, unspecified type  -     TSH, 3rd generation; Future    Asymptomatic postmenopausal estrogen deficiency  -     DXA bone density spine hip and pelvis; Future    Breast cancer screening by mammogram  -     Mammo screening bilateral w cad; Future    Need for vaccination  -     Cancel: PNEUMOCOCCAL CONJUGATE VACCINE 13-VALENT GREATER THAN 6 MONTHS  -     PNEUMOCOCCAL POLYSACCHARIDE VACCINE 23-VALENT =>3YO SQ IM        Reviewed with patient plan to treat with above plan      Patient instructed to call in 72 hours if not feeling better or if symptoms worse

## 2021-09-01 DIAGNOSIS — E03.9 HYPOTHYROIDISM, UNSPECIFIED TYPE: Primary | ICD-10-CM

## 2021-09-01 RX ORDER — LEVOTHYROXINE SODIUM 112 UG/1
112 TABLET ORAL
Qty: 30 TABLET | Refills: 5 | Status: SHIPPED | OUTPATIENT
Start: 2021-09-01 | End: 2021-12-06

## 2021-09-23 ENCOUNTER — TELEPHONE (OUTPATIENT)
Dept: FAMILY MEDICINE CLINIC | Facility: CLINIC | Age: 66
End: 2021-09-23

## 2021-09-23 NOTE — TELEPHONE ENCOUNTER
Phyllis renetta, she is asking for a virtual, it's hard to get off of work  She is aware phyllis may not be a virtual   Has a red vargas on her leg  It started with a pimple, she popped it, now it is red, tender to touch

## 2021-09-24 ENCOUNTER — TELEMEDICINE (OUTPATIENT)
Dept: FAMILY MEDICINE CLINIC | Facility: CLINIC | Age: 66
End: 2021-09-24
Payer: MEDICARE

## 2021-09-24 DIAGNOSIS — L03.90 CELLULITIS, UNSPECIFIED CELLULITIS SITE: Primary | ICD-10-CM

## 2021-09-24 PROCEDURE — 99213 OFFICE O/P EST LOW 20 MIN: CPT | Performed by: FAMILY MEDICINE

## 2021-09-24 RX ORDER — CEPHALEXIN 500 MG/1
500 CAPSULE ORAL EVERY 8 HOURS SCHEDULED
Qty: 30 CAPSULE | Refills: 0 | Status: SHIPPED | OUTPATIENT
Start: 2021-09-24 | End: 2021-10-04

## 2021-09-27 NOTE — PROGRESS NOTES
Virtual Regular Visit    Verification of patient location:    Patient is located in the following state in which I hold an active license [place]      Assessment/Plan:    Problem List Items Addressed This Visit     None      Visit Diagnoses     Cellulitis, unspecified cellulitis site    -  Primary    Relevant Medications    cephalexin (KEFLEX) 500 mg capsule               Reason for visit is No chief complaint on file  Encounter provider Jennifer Tao DO    Provider located at [address] [id number]      Recent Visits  Date Type Provider Dept   [facility] [address] [place] [address] Drive recent visits within past 7 days and meeting all other requirements  Future Appointments  No visits were found meeting these conditions  Showing future appointments within next 150 days and meeting all other requirements       The patient was identified by name and date of birth  [place] Show was informed that this is a telemedicine visit and that the visit is being conducted through [address] [facility] and patient was informed that this is a secure, HIPAA-compliant platform  She agrees to proceed     My office door was closed  No one else was in the room  She acknowledged consent and understanding of privacy and security of the video platform  The patient has agreed to participate and understands they can discontinue the visit at any time  Patient is aware this is a billable service  Shavon Figueroa is a 73 y o  female complaining of a pustule that broke on her left lower leg  At first she tthought it was a pimple, but now sh3 thinks she could have been bit by a bug  Her leg is red and warm around the site           HPI     Past Medical History:   Diagnosis Date    Disease of thyroid gland     Hypertension        Past Surgical History:   Procedure Laterality Date     SECTION      MD LAP,CHOLECYSTECTOMY N/A 11/15/2017    Procedure: CHOLECYSTECTOMY LAPAROSCOPIC, umbilical hernia repair;  Surgeon: Natalia Abdi MD;  [facility];  Service: General    TONSILLECTOMY         Current Outpatient Medications   Medication Sig Dispense Refill    Advair Diskus 250-50 MCG/DOSE inhaler INHALE 1 PUFF 2 (TWO) TIMES A DAY RINSE MOUTH AFTER USE  1 Inhaler 1    Ascorbic Acid (VITAMIN C) 500 MG CAPS Take by mouth      azelastine (ASTELIN) 0 1 % nasal spray 1 SPRAY INTO EACH NOSTRIL 2 (TWO) TIMES A DAY USE IN EACH NOSTRIL AS DIRECTED 1 mL 1    cephalexin (KEFLEX) 500 mg capsule Take 1 capsule (500 mg total) by mouth every 8 (eight) hours for 10 days 30 capsule 0    Cetirizine HCl (ZYRTEC ALLERGY) 10 MG CAPS Take by mouth      Cholecalciferol (CVS D3) 125 MCG (5000 UT) capsule TAKE 1 CAPSULE BY MOUTH EVERY DAY 30 capsule 3    famotidine (PEPCID) 40 MG tablet TAKE 1 TABLET BY MOUTH EVERY DAY 90 tablet 3    fluticasone (FLONASE) 50 mcg/act nasal spray 2 sprays into each nostril daily 15 8 g 5    gabapentin (NEURONTIN) 300 mg capsule TAKE 1 CAPSULE BY MOUTH THREE TIMES A DAY 90 capsule 3    levothyroxine 112 mcg tablet Take 1 tablet (112 mcg total) by mouth daily in the early morning 30 tablet 5    lisinopril (ZESTRIL) 20 mg tablet TAKE 1 TABLET BY MOUTH EVERY DAY 90 tablet 1    meclizine (ANTIVERT) 25 mg tablet Take 1 tablet (25 mg total) by mouth 3 (three) times a day as needed for dizziness 30 tablet 0    potassium chloride (MICRO-K) 10 MEQ CR capsule TAKE 2 CAPSULES (20 MEQ TOTAL) BY MOUTH DAILY 180 capsule 3    spironolactone (ALDACTONE) 25 mg tablet TAKE 1 TABLET BY MOUTH EVERY DAY 90 tablet 3     No current facility-administered medications for this visit  Allergies   Allergen Reactions    Morphine GI Intolerance     Reaction Date: 29Aug2011;     Escitalopram Rash     Reaction Date: 29Aug2011;        Review of Systems   Constitutional: Negative for fatigue and fever  Musculoskeletal: Negative for gait problem  Skin: Positive for wound  Left lower leg with a puncture and erythema surrounding site  Site is warm and slightly tender   All other systems reviewed and are negative  Video Exam    There were no vitals filed for this visit  Physical Exam  Vitals reviewed  Constitutional:       Appearance: Normal appearance  She is normal weight  She is not ill-appearing  Eyes:      General:         Right eye: No discharge  Left eye: No discharge  Pulmonary:      Effort: No respiratory distress  Skin:     Findings: Erythema present  Comments: Left lower leg erythematous with puncture in center   Neurological:      Mental Status: She is oriented to person, place, and time  Mental status is at baseline  Psychiatric:         Mood and Affect: Mood normal          Behavior: Behavior normal          Thought Content: Thought content normal          Judgment: Judgment normal           I spent 15 minutes directly with the patient during this visit    [address] [place] [address] verbally agrees to participate in [employer]  Pt is aware that [employer] could be limited without vital signs or the ability to perform a full hands-on physical exam  Chica Allen understands she or the provider may request at any time to terminate the video visit and request the patient to seek care or treatment in person

## 2021-10-11 ENCOUNTER — PRE-OP CATARACT MEASUREMENTS (OUTPATIENT)
Dept: URBAN - METROPOLITAN AREA CLINIC 6 | Facility: CLINIC | Age: 66
End: 2021-10-11

## 2021-10-11 DIAGNOSIS — H18.453: ICD-10-CM

## 2021-10-11 DIAGNOSIS — H25.813: ICD-10-CM

## 2021-10-11 PROCEDURE — 92012 INTRM OPH EXAM EST PATIENT: CPT

## 2021-10-11 PROCEDURE — 92136 OPHTHALMIC BIOMETRY: CPT

## 2021-10-11 PROCEDURE — G8427 DOCREV CUR MEDS BY ELIG CLIN: HCPCS

## 2021-10-11 ASSESSMENT — VISUAL ACUITY
OS_CC: 20/70
OD_PH: 20/50+2
OU_CC: J5
OD_CC: 20/60

## 2021-10-11 ASSESSMENT — KERATOMETRY
OD_K1POWER_DIOPTERS: 42.50
OD_AXISANGLE2_DEGREES: 127
OD_AXISANGLE_DEGREES: 037
OS_AXISANGLE2_DEGREES: 179
OS_K2POWER_DIOPTERS: 44.25
OD_K2POWER_DIOPTERS: 45.25
OS_K1POWER_DIOPTERS: 39.50
OS_AXISANGLE_DEGREES: 089

## 2021-10-12 DIAGNOSIS — J30.9 ALLERGIC RHINITIS, UNSPECIFIED SEASONALITY, UNSPECIFIED TRIGGER: ICD-10-CM

## 2021-10-12 RX ORDER — AZELASTINE 1 MG/ML
1 SPRAY, METERED NASAL 2 TIMES DAILY
Qty: 1 ML | Refills: 1 | Status: SHIPPED | OUTPATIENT
Start: 2021-10-12 | End: 2022-04-18

## 2021-10-19 ENCOUNTER — OFFICE VISIT (OUTPATIENT)
Dept: FAMILY MEDICINE CLINIC | Facility: CLINIC | Age: 66
End: 2021-10-19
Payer: MEDICARE

## 2021-10-19 VITALS
DIASTOLIC BLOOD PRESSURE: 80 MMHG | HEART RATE: 80 BPM | HEIGHT: 65 IN | BODY MASS INDEX: 28.49 KG/M2 | TEMPERATURE: 98.2 F | WEIGHT: 171 LBS | SYSTOLIC BLOOD PRESSURE: 122 MMHG

## 2021-10-19 DIAGNOSIS — I10 PRIMARY HYPERTENSION: ICD-10-CM

## 2021-10-19 DIAGNOSIS — E03.9 ACQUIRED HYPOTHYROIDISM: ICD-10-CM

## 2021-10-19 DIAGNOSIS — H26.9 CATARACT OF BOTH EYES, UNSPECIFIED CATARACT TYPE: ICD-10-CM

## 2021-10-19 DIAGNOSIS — Z01.818 PREOPERATIVE CLEARANCE: Primary | ICD-10-CM

## 2021-10-19 PROCEDURE — 99213 OFFICE O/P EST LOW 20 MIN: CPT | Performed by: FAMILY MEDICINE

## 2021-10-22 ENCOUNTER — OFFICE VISIT (OUTPATIENT)
Dept: FAMILY MEDICINE CLINIC | Facility: CLINIC | Age: 66
End: 2021-10-22
Payer: MEDICARE

## 2021-10-22 VITALS
DIASTOLIC BLOOD PRESSURE: 102 MMHG | HEIGHT: 65 IN | SYSTOLIC BLOOD PRESSURE: 150 MMHG | BODY MASS INDEX: 28.66 KG/M2 | WEIGHT: 172 LBS | OXYGEN SATURATION: 97 % | RESPIRATION RATE: 14 BRPM | HEART RATE: 72 BPM | TEMPERATURE: 99.1 F

## 2021-10-22 DIAGNOSIS — N39.0 URINARY TRACT INFECTION WITHOUT HEMATURIA, SITE UNSPECIFIED: Primary | ICD-10-CM

## 2021-10-22 DIAGNOSIS — R42 DIZZINESS: ICD-10-CM

## 2021-10-22 DIAGNOSIS — R10.2 PELVIC PAIN: ICD-10-CM

## 2021-10-22 PROBLEM — M46.1 SACROILIITIS (HCC): Status: ACTIVE | Noted: 2021-10-22

## 2021-10-22 LAB
SL AMB  POCT GLUCOSE, UA: NEGATIVE
SL AMB LEUKOCYTE ESTERASE,UA: ABNORMAL
SL AMB POCT BILIRUBIN,UA: NEGATIVE
SL AMB POCT BLOOD,UA: NEGATIVE
SL AMB POCT CLARITY,UA: CLEAR
SL AMB POCT COLOR,UA: ABNORMAL
SL AMB POCT KETONES,UA: NEGATIVE
SL AMB POCT NITRITE,UA: NEGATIVE
SL AMB POCT PH,UA: 6
SL AMB POCT SPECIFIC GRAVITY,UA: 1.02
SL AMB POCT URINE PROTEIN: ABNORMAL
SL AMB POCT UROBILINOGEN: 0.2

## 2021-10-22 PROCEDURE — 99213 OFFICE O/P EST LOW 20 MIN: CPT | Performed by: FAMILY MEDICINE

## 2021-10-22 PROCEDURE — 81002 URINALYSIS NONAUTO W/O SCOPE: CPT | Performed by: FAMILY MEDICINE

## 2021-10-22 RX ORDER — MECLIZINE HYDROCHLORIDE 25 MG/1
25 TABLET ORAL 3 TIMES DAILY PRN
Qty: 30 TABLET | Refills: 0 | Status: SHIPPED | OUTPATIENT
Start: 2021-10-22

## 2021-10-22 RX ORDER — CIPROFLOXACIN 250 MG/1
250 TABLET, FILM COATED ORAL EVERY 12 HOURS SCHEDULED
Qty: 20 TABLET | Refills: 0 | Status: SHIPPED | OUTPATIENT
Start: 2021-10-22 | End: 2021-11-01

## 2021-10-22 RX ORDER — PHENAZOPYRIDINE HYDROCHLORIDE 200 MG/1
200 TABLET, FILM COATED ORAL
Qty: 9 TABLET | Refills: 0 | Status: SHIPPED | OUTPATIENT
Start: 2021-10-22 | End: 2022-04-18

## 2021-10-28 DIAGNOSIS — N39.0 URINARY TRACT INFECTION WITHOUT HEMATURIA, SITE UNSPECIFIED: Primary | ICD-10-CM

## 2021-10-28 RX ORDER — NITROFURANTOIN 25; 75 MG/1; MG/1
100 CAPSULE ORAL 2 TIMES DAILY
Qty: 20 CAPSULE | Refills: 0 | Status: SHIPPED | OUTPATIENT
Start: 2021-10-28 | End: 2022-04-18

## 2021-10-28 RX ORDER — PHENAZOPYRIDINE HYDROCHLORIDE 200 MG/1
200 TABLET, FILM COATED ORAL
Qty: 9 TABLET | Refills: 0 | Status: SHIPPED | OUTPATIENT
Start: 2021-10-28 | End: 2022-04-18

## 2021-11-02 ENCOUNTER — SURGERY/PROCEDURE (OUTPATIENT)
Dept: URBAN - METROPOLITAN AREA SURGICAL CENTER 6 | Facility: SURGICAL CENTER | Age: 66
End: 2021-11-02

## 2021-11-02 DIAGNOSIS — H25.812: ICD-10-CM

## 2021-11-02 PROCEDURE — 66984 XCAPSL CTRC RMVL W/O ECP: CPT

## 2021-11-03 ENCOUNTER — 1 DAY POST-OP (OUTPATIENT)
Dept: URBAN - METROPOLITAN AREA CLINIC 6 | Facility: CLINIC | Age: 66
End: 2021-11-03

## 2021-11-03 DIAGNOSIS — Z96.1: ICD-10-CM

## 2021-11-03 PROCEDURE — G8427 DOCREV CUR MEDS BY ELIG CLIN: HCPCS

## 2021-11-03 PROCEDURE — 99024 POSTOP FOLLOW-UP VISIT: CPT

## 2021-11-03 ASSESSMENT — KERATOMETRY
OS_AXISANGLE2_DEGREES: 179
OS_AXISANGLE2_DEGREES: 179
OS_AXISANGLE_DEGREES: 089
OD_K2POWER_DIOPTERS: 45.25
OD_K2POWER_DIOPTERS: 45.25
OS_K1POWER_DIOPTERS: 39.50
OS_AXISANGLE_DEGREES: 089
OD_AXISANGLE2_DEGREES: 127
OD_AXISANGLE2_DEGREES: 127
OD_AXISANGLE_DEGREES: 037
OD_K1POWER_DIOPTERS: 42.50
OS_K2POWER_DIOPTERS: 44.25
OD_K1POWER_DIOPTERS: 42.50
OS_K1POWER_DIOPTERS: 39.50
OS_K2POWER_DIOPTERS: 44.25
OD_AXISANGLE_DEGREES: 037

## 2021-11-03 ASSESSMENT — TONOMETRY
OS_IOP_MMHG: 18
OD_IOP_MMHG: 18

## 2021-11-03 ASSESSMENT — VISUAL ACUITY
OD_SC: 20/400
OD_CC: 20/60
OD_PH: 20/50
OS_SC: 20/40

## 2021-11-04 ENCOUNTER — PROBLEM (OUTPATIENT)
Dept: URBAN - METROPOLITAN AREA CLINIC 6 | Facility: CLINIC | Age: 66
End: 2021-11-04

## 2021-11-04 DIAGNOSIS — Z96.1: ICD-10-CM

## 2021-11-04 PROCEDURE — 92012 INTRM OPH EXAM EST PATIENT: CPT

## 2021-11-04 PROCEDURE — G8427 DOCREV CUR MEDS BY ELIG CLIN: HCPCS

## 2021-11-04 ASSESSMENT — KERATOMETRY
OD_AXISANGLE2_DEGREES: 127
OS_AXISANGLE_DEGREES: 089
OD_AXISANGLE_DEGREES: 037
OS_AXISANGLE2_DEGREES: 179
OS_K2POWER_DIOPTERS: 44.25
OD_K1POWER_DIOPTERS: 42.50
OS_K1POWER_DIOPTERS: 39.50
OD_K2POWER_DIOPTERS: 45.25

## 2021-11-04 ASSESSMENT — VISUAL ACUITY
OS_SC: 20/50-1
OS_PH: 20/30-2

## 2021-11-04 ASSESSMENT — TONOMETRY
OS_IOP_MMHG: 22
OS_IOP_MMHG: 14

## 2021-11-10 ENCOUNTER — 1 WEEK POST-OP (OUTPATIENT)
Dept: URBAN - METROPOLITAN AREA CLINIC 6 | Facility: CLINIC | Age: 66
End: 2021-11-10

## 2021-11-10 DIAGNOSIS — Z96.1: ICD-10-CM

## 2021-11-10 DIAGNOSIS — H25.811: ICD-10-CM

## 2021-11-10 PROCEDURE — 99024 POSTOP FOLLOW-UP VISIT: CPT

## 2021-11-10 PROCEDURE — 92136 OPHTHALMIC BIOMETRY: CPT | Mod: 26,RT

## 2021-11-10 PROCEDURE — G8427 DOCREV CUR MEDS BY ELIG CLIN: HCPCS

## 2021-11-10 PROCEDURE — 1036F TOBACCO NON-USER: CPT

## 2021-11-10 ASSESSMENT — TONOMETRY
OS_IOP_MMHG: 17
OD_IOP_MMHG: 15

## 2021-11-10 ASSESSMENT — KERATOMETRY
OS_AXISANGLE2_DEGREES: 179
OD_AXISANGLE_DEGREES: 037
OD_AXISANGLE2_DEGREES: 127
OS_K1POWER_DIOPTERS: 39.50
OS_K2POWER_DIOPTERS: 44.25
OD_K2POWER_DIOPTERS: 45.25
OS_AXISANGLE_DEGREES: 089
OD_K1POWER_DIOPTERS: 42.50

## 2021-11-10 ASSESSMENT — VISUAL ACUITY
OD_PH: 20/200+1
OD_SC: 20/400
OS_PH: 20/40+2
OS_SC: 20/40

## 2021-11-16 ENCOUNTER — SURGERY/PROCEDURE (OUTPATIENT)
Dept: URBAN - METROPOLITAN AREA SURGICAL CENTER 6 | Facility: SURGICAL CENTER | Age: 66
End: 2021-11-16

## 2021-11-16 DIAGNOSIS — H25.811: ICD-10-CM

## 2021-11-16 PROCEDURE — 66984 XCAPSL CTRC RMVL W/O ECP: CPT | Mod: 79,RT

## 2021-11-17 ENCOUNTER — 1 DAY POST-OP (OUTPATIENT)
Dept: URBAN - METROPOLITAN AREA CLINIC 6 | Facility: CLINIC | Age: 66
End: 2021-11-17

## 2021-11-17 DIAGNOSIS — Z96.1: ICD-10-CM

## 2021-11-17 PROCEDURE — 1036F TOBACCO NON-USER: CPT

## 2021-11-17 PROCEDURE — 99024 POSTOP FOLLOW-UP VISIT: CPT

## 2021-11-17 PROCEDURE — G8427 DOCREV CUR MEDS BY ELIG CLIN: HCPCS

## 2021-11-17 ASSESSMENT — TONOMETRY
OS_IOP_MMHG: 14
OD_IOP_MMHG: 15

## 2021-11-17 ASSESSMENT — VISUAL ACUITY
OS_SC: 20/40
OD_SC: 20/80+1

## 2021-11-17 ASSESSMENT — KERATOMETRY
OD_AXISANGLE_DEGREES: 037
OD_AXISANGLE2_DEGREES: 127
OS_AXISANGLE_DEGREES: 089
OS_AXISANGLE2_DEGREES: 179
OD_K2POWER_DIOPTERS: 45.25
OS_K2POWER_DIOPTERS: 44.25
OS_K1POWER_DIOPTERS: 39.50
OD_K1POWER_DIOPTERS: 42.50

## 2021-11-18 ASSESSMENT — KERATOMETRY
OS_K2POWER_DIOPTERS: 44.25
OS_AXISANGLE2_DEGREES: 179
OS_AXISANGLE_DEGREES: 089
OD_K1POWER_DIOPTERS: 42.50
OD_AXISANGLE_DEGREES: 037
OS_K1POWER_DIOPTERS: 39.50
OD_AXISANGLE2_DEGREES: 127
OD_K2POWER_DIOPTERS: 45.25

## 2021-11-24 ENCOUNTER — 1 WEEK POST-OP (OUTPATIENT)
Dept: URBAN - METROPOLITAN AREA CLINIC 6 | Facility: CLINIC | Age: 66
End: 2021-11-24

## 2021-11-24 ENCOUNTER — TELEPHONE (OUTPATIENT)
Dept: FAMILY MEDICINE CLINIC | Facility: CLINIC | Age: 66
End: 2021-11-24

## 2021-11-24 DIAGNOSIS — H18.453: ICD-10-CM

## 2021-11-24 DIAGNOSIS — Z96.1: ICD-10-CM

## 2021-11-24 PROCEDURE — G8427 DOCREV CUR MEDS BY ELIG CLIN: HCPCS

## 2021-11-24 PROCEDURE — 1036F TOBACCO NON-USER: CPT

## 2021-11-24 PROCEDURE — 99024 POSTOP FOLLOW-UP VISIT: CPT

## 2021-11-24 ASSESSMENT — VISUAL ACUITY
OS_PH: 20/30
OD_PH: 20/30-1
OS_SC: 20/40
OD_SC: 20/40-1

## 2021-11-24 ASSESSMENT — TONOMETRY
OD_IOP_MMHG: 15
OS_IOP_MMHG: 15

## 2021-11-24 ASSESSMENT — KERATOMETRY
OD_AXISANGLE2_DEGREES: 127
OD_K2POWER_DIOPTERS: 45.25
OS_AXISANGLE_DEGREES: 089
OS_K1POWER_DIOPTERS: 39.50
OS_K2POWER_DIOPTERS: 44.25
OD_K1POWER_DIOPTERS: 42.50
OS_AXISANGLE2_DEGREES: 179
OD_AXISANGLE_DEGREES: 037

## 2021-12-04 DIAGNOSIS — M54.50 BACK PAIN, LUMBOSACRAL: ICD-10-CM

## 2021-12-04 DIAGNOSIS — I10 ESSENTIAL HYPERTENSION: ICD-10-CM

## 2021-12-04 DIAGNOSIS — E03.9 HYPOTHYROIDISM, UNSPECIFIED TYPE: ICD-10-CM

## 2021-12-06 RX ORDER — LISINOPRIL 20 MG/1
TABLET ORAL
Qty: 90 TABLET | Refills: 1 | Status: SHIPPED | OUTPATIENT
Start: 2021-12-06 | End: 2022-05-09

## 2021-12-06 RX ORDER — GABAPENTIN 300 MG/1
CAPSULE ORAL
Qty: 90 CAPSULE | Refills: 3 | Status: SHIPPED | OUTPATIENT
Start: 2021-12-06

## 2021-12-06 RX ORDER — LEVOTHYROXINE SODIUM 112 MCG
TABLET ORAL
Qty: 90 TABLET | Refills: 1 | Status: SHIPPED | OUTPATIENT
Start: 2021-12-06 | End: 2022-05-09

## 2021-12-09 ENCOUNTER — 1 MONTH POST-OP (OUTPATIENT)
Dept: URBAN - METROPOLITAN AREA CLINIC 6 | Facility: CLINIC | Age: 66
End: 2021-12-09

## 2021-12-09 DIAGNOSIS — H18.453: ICD-10-CM

## 2021-12-09 DIAGNOSIS — D23.111: ICD-10-CM

## 2021-12-09 DIAGNOSIS — Z96.1: ICD-10-CM

## 2021-12-09 PROCEDURE — 99024 POSTOP FOLLOW-UP VISIT: CPT

## 2021-12-09 PROCEDURE — 1036F TOBACCO NON-USER: CPT

## 2021-12-09 PROCEDURE — G8427 DOCREV CUR MEDS BY ELIG CLIN: HCPCS

## 2021-12-09 ASSESSMENT — KERATOMETRY
OS_AXISANGLE2_DEGREES: 179
OD_K1POWER_DIOPTERS: 42.50
OD_K2POWER_DIOPTERS: 45.25
OS_K1POWER_DIOPTERS: 39.50
OS_AXISANGLE_DEGREES: 089
OS_K2POWER_DIOPTERS: 44.25
OD_AXISANGLE_DEGREES: 037
OD_AXISANGLE2_DEGREES: 127

## 2021-12-09 ASSESSMENT — VISUAL ACUITY
OS_PH: 20/30
OD_PH: 20/30+2
OD_SC: 20/40
OS_SC: 20/40

## 2021-12-09 ASSESSMENT — TONOMETRY
OD_IOP_MMHG: 10
OS_IOP_MMHG: 13

## 2022-01-05 ENCOUNTER — TELEPHONE (OUTPATIENT)
Dept: FAMILY MEDICINE CLINIC | Facility: CLINIC | Age: 67
End: 2022-01-05

## 2022-01-05 PROCEDURE — U0003 INFECTIOUS AGENT DETECTION BY NUCLEIC ACID (DNA OR RNA); SEVERE ACUTE RESPIRATORY SYNDROME CORONAVIRUS 2 (SARS-COV-2) (CORONAVIRUS DISEASE [COVID-19]), AMPLIFIED PROBE TECHNIQUE, MAKING USE OF HIGH THROUGHPUT TECHNOLOGIES AS DESCRIBED BY CMS-2020-01-R: HCPCS | Performed by: NURSE PRACTITIONER

## 2022-01-05 PROCEDURE — U0005 INFEC AGEN DETEC AMPLI PROBE: HCPCS | Performed by: NURSE PRACTITIONER

## 2022-01-05 NOTE — TELEPHONE ENCOUNTER
Patient cannot do rapid, must be PCR  She is aware to go to [facility]   Please enter

## 2022-01-05 NOTE — TELEPHONE ENCOUNTER
She is asking for a covid pcr test  Started on 1/2 with cough, achy, chills,  Her dtr is covid + and lives with her

## 2022-01-11 DIAGNOSIS — U07.1 COVID-19: Primary | ICD-10-CM

## 2022-01-11 RX ORDER — AZITHROMYCIN 250 MG/1
TABLET, FILM COATED ORAL
Qty: 11 TABLET | Refills: 0 | Status: SHIPPED | OUTPATIENT
Start: 2022-01-11 | End: 2022-01-21

## 2022-01-14 DIAGNOSIS — U07.1 COVID-19: ICD-10-CM

## 2022-01-14 DIAGNOSIS — R19.7 DIARRHEA, UNSPECIFIED TYPE: Primary | ICD-10-CM

## 2022-01-14 RX ORDER — GUAIFENESIN AND CODEINE PHOSPHATE 100; 10 MG/5ML; MG/5ML
SOLUTION ORAL
Qty: 180 ML | Refills: 1 | Status: SHIPPED | OUTPATIENT
Start: 2022-01-14 | End: 2022-04-18

## 2022-01-14 RX ORDER — DIPHENOXYLATE HYDROCHLORIDE AND ATROPINE SULFATE 2.5; .025 MG/1; MG/1
1 TABLET ORAL 4 TIMES DAILY PRN
Qty: 30 TABLET | Refills: 0 | Status: SHIPPED | OUTPATIENT
Start: 2022-01-14 | End: 2022-04-18

## 2022-01-19 ENCOUNTER — TELEPHONE (OUTPATIENT)
Dept: FAMILY MEDICINE CLINIC | Facility: CLINIC | Age: 67
End: 2022-01-19

## 2022-01-19 NOTE — TELEPHONE ENCOUNTER
Please call patient   Patient made a mychart visit for 02/18  The reason for visit says Appointment  Please clarify reason for visit     Also patient is overdue for AWV- could this be changed to an AWV and scheduled in appropriate spot

## 2022-02-21 ENCOUNTER — ESTABLISHED (OUTPATIENT)
Dept: URBAN - METROPOLITAN AREA CLINIC 6 | Facility: CLINIC | Age: 67
End: 2022-02-21

## 2022-02-21 DIAGNOSIS — H18.453: ICD-10-CM

## 2022-02-21 DIAGNOSIS — H04.123: ICD-10-CM

## 2022-02-21 DIAGNOSIS — Z96.1: ICD-10-CM

## 2022-02-21 DIAGNOSIS — D23.111: ICD-10-CM

## 2022-02-21 PROCEDURE — 67840 REMOVE EYELID LESION: CPT

## 2022-02-21 PROCEDURE — 92012 INTRM OPH EXAM EST PATIENT: CPT | Mod: 25

## 2022-02-21 ASSESSMENT — KERATOMETRY
OD_K2POWER_DIOPTERS: 45.25
OD_AXISANGLE2_DEGREES: 127
OS_K2POWER_DIOPTERS: 44.25
OD_K1POWER_DIOPTERS: 42.50
OS_AXISANGLE2_DEGREES: 179
OS_AXISANGLE_DEGREES: 089
OS_K1POWER_DIOPTERS: 39.50
OD_AXISANGLE_DEGREES: 037

## 2022-02-21 ASSESSMENT — VISUAL ACUITY
OD_SC: 20/40+1
OS_SC: 20/30-2

## 2022-03-07 ENCOUNTER — TELEPHONE (OUTPATIENT)
Dept: FAMILY MEDICINE CLINIC | Facility: CLINIC | Age: 67
End: 2022-03-07

## 2022-03-07 ENCOUNTER — TELEMEDICINE (OUTPATIENT)
Dept: FAMILY MEDICINE CLINIC | Facility: CLINIC | Age: 67
End: 2022-03-07
Payer: MEDICARE

## 2022-03-07 DIAGNOSIS — J06.9 UPPER RESPIRATORY TRACT INFECTION, UNSPECIFIED TYPE: Primary | ICD-10-CM

## 2022-03-07 PROCEDURE — 99213 OFFICE O/P EST LOW 20 MIN: CPT | Performed by: FAMILY MEDICINE

## 2022-03-07 RX ORDER — DEXTROMETHORPHAN HYDROBROMIDE AND PROMETHAZINE HYDROCHLORIDE 15; 6.25 MG/5ML; MG/5ML
5 SOLUTION ORAL 4 TIMES DAILY PRN
Qty: 180 ML | Refills: 0 | Status: SHIPPED | OUTPATIENT
Start: 2022-03-07 | End: 2022-04-18

## 2022-03-07 RX ORDER — LEVOFLOXACIN 500 MG/1
500 TABLET, FILM COATED ORAL EVERY 24 HOURS
Qty: 10 TABLET | Refills: 0 | Status: SHIPPED | OUTPATIENT
Start: 2022-03-07 | End: 2022-03-17

## 2022-03-07 NOTE — TELEPHONE ENCOUNTER
----- Message from Gabby Robertson sent at 3/4/2022 10:38 AM EST -----  Regarding: FW: Sick again, ugh!    ----- Message -----  From: Sea Garcia  Sent: 3/4/2022  10:21 AM EST  To: [facility]  Subject: Sick again, ugh! Hi Dr Coral Barakat and the baby has been living with us the past few months while their house is being worked on  Gini brought covid into the house in  and now has brought something else in  She and Eduar saw Dr Adolfo Morales this week--maybe you were out  He did not prescribe anything for her at first but yesterday did give her Amoxicillin 875  I am also sick and Janee Im too  Unfortunately I do not have any sick time left due to my eye surgery and covid and have a viewing and  tomorrow  Is there anyway I can do a virtural visit with you today  I have nasal congestion, blowing nose, and tickle in throat so coughing  My right ear hurt last night  Does not hurt as much today-just a little  No fever  I just can't start next week still sick if possible  I do not have a check up appointment with you until the day after Nicole  Thank you, Masoud Buckley        Also    Mya Millan mom Chrystal Alina Kidney) had lab work yesterday  I am not sure if all of it was for Dr Meg Alfonso or someone you ordered  I see her hemaglobin is very low  Is this the type of anemia that would respond to Fe? If so can I give her Gentle Iron which is a supplement but easier on the GI tract? Also K+ is low  She is presently taking 30 Meq   do you want me to increase to 40? Thank you and have a great weekend  You can reach me at [phone] or my work # is [phone] ext  [phone]

## 2022-03-08 NOTE — PROGRESS NOTES
Virtual Regular Visit    Verification of patient location:    Patient is located in the following state in which I hold an active license [place]      Assessment/Plan:    Problem List Items Addressed This Visit     None      Visit Diagnoses     Upper respiratory tract infection, unspecified type    -  Primary    Relevant Medications    levofloxacin (LEVAQUIN) 500 mg tablet    Promethazine-DM (PHENERGAN-DM) 6 25-15 mg/5 mL oral syrup               Reason for visit is No chief complaint on file  Encounter provider Juli Loredo DO    Provider located at [address]      Recent Visits  Date Type Provider Dept   03/07/22 1906 [address]   03/07/22 Telephone [place]g [id number] Stacy Weller recent visits within past 7 days and meeting all other requirements  Future Appointments  No visits were found meeting these conditions  Showing future appointments within next 150 days and meeting all other requirements       The patient was identified by name and date of birth  Ela Kapadia was informed that this is a telemedicine visit and that the visit is being conducted through [facility] and patient was informed this is a secure, HIPAA-complaint platform  She agrees to proceed     My office door was closed  No one else was in the room  She acknowledged consent and understanding of privacy and security of the video platform  The patient has agreed to participate and understands they can discontinue the visit at any time  Patient is aware this is a billable service  Jean Carlos Samuel is a 73 y o  female  Presents with right ear pain, dry cough and pnd  She took a rapid covid and is neg  Jose Alejandro Heckler       HPI     Past Medical History:   Diagnosis Date    Disease of thyroid gland     Hypertension        Past Surgical History:   Procedure Laterality Date     SECTION      AL LAP,CHOLECYSTECTOMY N/A 11/15/2017    Procedure: CHOLECYSTECTOMY LAPAROSCOPIC, umbilical hernia repair;  Surgeon: Nabila Sorto MD;  [facility];  Service: General    TONSILLECTOMY         Current Outpatient Medications   Medication Sig Dispense Refill    Advair Diskus 250-50 MCG/DOSE inhaler INHALE 1 PUFF 2 (TWO) TIMES A DAY RINSE MOUTH AFTER USE  (Patient taking differently: as needed ) 1 Inhaler 1    Ascorbic Acid (VITAMIN C) 500 MG CAPS Take by mouth      azelastine (ASTELIN) 0 1 % nasal spray 1 SPRAY INTO EACH NOSTRIL 2 (TWO) TIMES A DAY USE IN EACH NOSTRIL AS DIRECTED 1 mL 1    Cetirizine HCl (ZYRTEC ALLERGY) 10 MG CAPS Take by mouth daily       Cholecalciferol (CVS D3) 125 MCG (5000 UT) capsule TAKE 1 CAPSULE BY MOUTH EVERY DAY 30 capsule 3    diphenoxylate-atropine (LOMOTIL) 2 5-0 025 mg per tablet Take 1 tablet by mouth 4 (four) times a day as needed for diarrhea 30 tablet 0    famotidine (PEPCID) 40 MG tablet TAKE 1 TABLET BY MOUTH EVERY DAY 90 tablet 3    fluticasone (FLONASE) 50 mcg/act nasal spray 2 sprays into each nostril daily (Patient taking differently: 2 sprays into each nostril as needed ) 15 8 g 5    fluticasone-salmeterol (Wixela Inhub) 250-50 mcg/dose inhaler Inhale 1 puff 2 (two) times a day Rinse mouth after use   60 blister 1    gabapentin (NEURONTIN) 300 mg capsule TAKE 1 CAPSULE BY MOUTH THREE TIMES A DAY 90 capsule 3    guaifenesin-codeine (GUAIFENESIN AC) 100-10 MG/5ML liquid 1-2 tsp q6 prn cough 180 mL 1    levofloxacin (LEVAQUIN) 500 mg tablet Take 1 tablet (500 mg total) by mouth every 24 hours for 10 days 10 tablet 0    lisinopril (ZESTRIL) 20 mg tablet TAKE 1 TABLET BY MOUTH EVERY DAY 90 tablet 1    meclizine (ANTIVERT) 25 mg tablet Take 1 tablet (25 mg total) by mouth 3 (three) times a day as needed for dizziness 30 tablet 0    methylPREDNISolone 4 MG tablet therapy pack Use as directed on package 21 each 0    nitrofurantoin (MACROBID) 100 mg capsule Take 1 capsule (100 mg total) by mouth 2 (two) times a day 20 capsule 0    phenazopyridine (PYRIDIUM) 200 mg tablet Take 1 tablet (200 mg total) by mouth 3 (three) times a day with meals 9 tablet 0    phenazopyridine (PYRIDIUM) 200 mg tablet Take 1 tablet (200 mg total) by mouth 3 (three) times a day with meals 9 tablet 0    potassium chloride (MICRO-K) 10 MEQ CR capsule TAKE 2 CAPSULES (20 MEQ TOTAL) BY MOUTH DAILY 180 capsule 3    Promethazine-DM (PHENERGAN-DM) 6 25-15 mg/5 mL oral syrup Take 5 mL by mouth 4 (four) times a day as needed for cough 180 mL 0    spironolactone (ALDACTONE) 25 mg tablet TAKE 1 TABLET BY MOUTH EVERY DAY (Patient taking differently: as needed ) 90 tablet 3    Synthroid 112 MCG tablet TAKE 1 TABLET (112 MCG TOTAL) BY MOUTH DAILY IN THE EARLY MORNING 90 tablet 1     No current facility-administered medications for this visit  Allergies   Allergen Reactions    Morphine GI Intolerance     Reaction Date: 29Aug2011;     Escitalopram Rash     Reaction Date: 29Aug2011;        Review of Systems   Constitutional: Negative for fever  HENT: Positive for ear pain and postnasal drip  Respiratory: Positive for cough  Dry cough from pnd   Gastrointestinal: Negative for diarrhea, nausea and vomiting  Musculoskeletal: Negative for arthralgias and myalgias  Neurological: Negative for dizziness  All other systems reviewed and are negative  Video Exam    There were no vitals filed for this visit  Physical Exam  Vitals reviewed  Constitutional:       Appearance: Normal appearance  Eyes:      General:         Right eye: No discharge  Left eye: No discharge  Pulmonary:      Effort: No respiratory distress  Neurological:      General: No focal deficit present  Mental Status: She is alert and oriented to person, place, and time     Psychiatric:         Mood and Affect: Mood normal          Behavior: Behavior normal  Thought Content: Thought content normal          Judgment: Judgment normal           I spent 15 minutes directly with the patient during this visit    [address] [place] [address] verbally agrees to participate in [employer]  Pt is aware that [employer] could be limited without vital signs or the ability to perform a full hands-on physical exam  Chica Allen understands she or the provider may request at any time to terminate the video visit and request the patient to seek care or treatment in person

## 2022-03-28 ENCOUNTER — FOLLOW UP (OUTPATIENT)
Dept: URBAN - METROPOLITAN AREA CLINIC 6 | Facility: CLINIC | Age: 67
End: 2022-03-28

## 2022-03-28 DIAGNOSIS — D23.111: ICD-10-CM

## 2022-03-28 DIAGNOSIS — H04.123: ICD-10-CM

## 2022-03-28 DIAGNOSIS — H18.453: ICD-10-CM

## 2022-03-28 DIAGNOSIS — Z96.1: ICD-10-CM

## 2022-03-28 PROCEDURE — 92012 INTRM OPH EXAM EST PATIENT: CPT

## 2022-03-28 ASSESSMENT — KERATOMETRY
OD_K1POWER_DIOPTERS: 42.50
OD_AXISANGLE_DEGREES: 037
OS_K1POWER_DIOPTERS: 39.50
OD_AXISANGLE2_DEGREES: 127
OS_AXISANGLE2_DEGREES: 179
OS_AXISANGLE_DEGREES: 089
OS_K2POWER_DIOPTERS: 44.25
OD_K2POWER_DIOPTERS: 45.25

## 2022-03-28 ASSESSMENT — VISUAL ACUITY
OS_SC: 20/30-1
OD_SC: 20/40-1

## 2022-03-28 ASSESSMENT — TONOMETRY
OD_IOP_MMHG: 12
OS_IOP_MMHG: 14

## 2022-04-18 ENCOUNTER — OFFICE VISIT (OUTPATIENT)
Dept: FAMILY MEDICINE CLINIC | Facility: CLINIC | Age: 67
End: 2022-04-18
Payer: MEDICARE

## 2022-04-18 VITALS
HEIGHT: 65 IN | DIASTOLIC BLOOD PRESSURE: 90 MMHG | BODY MASS INDEX: 28.52 KG/M2 | OXYGEN SATURATION: 97 % | HEART RATE: 78 BPM | TEMPERATURE: 98 F | SYSTOLIC BLOOD PRESSURE: 126 MMHG | WEIGHT: 171.2 LBS

## 2022-04-18 DIAGNOSIS — E03.9 HYPOTHYROIDISM, UNSPECIFIED TYPE: ICD-10-CM

## 2022-04-18 DIAGNOSIS — Z23 NEED FOR VACCINATION: ICD-10-CM

## 2022-04-18 DIAGNOSIS — Z00.00 WELCOME TO MEDICARE PREVENTIVE VISIT: Primary | ICD-10-CM

## 2022-04-18 PROCEDURE — 90677 PCV20 VACCINE IM: CPT | Performed by: FAMILY MEDICINE

## 2022-04-18 PROCEDURE — G0402 INITIAL PREVENTIVE EXAM: HCPCS | Performed by: FAMILY MEDICINE

## 2022-04-18 PROCEDURE — 1123F ACP DISCUSS/DSCN MKR DOCD: CPT | Performed by: FAMILY MEDICINE

## 2022-04-18 PROCEDURE — G0009 ADMIN PNEUMOCOCCAL VACCINE: HCPCS | Performed by: FAMILY MEDICINE

## 2022-04-18 NOTE — PROGRESS NOTES
Assessment and Plan:          Answers for HPI/ROS submitted by the patient on 4/17/2022  How would you rate your overall health?: fair  Compared to last year, how is your physical health?: slightly worse  In general, how satisfied are you with your life?: satisfied  Compared to last year, how is your eyesight?: same  Compared to last year, how is your hearing?: same  Compared to last year, how is your emotional/mental health?: slightly better  How often is anger a problem for you?: never, rarely  How often do you feel unusually tired/fatigued?: often  In the past 7 days, how much pain have you experienced?: some  If you answered "some" or "a lot", please rate the severity of your pain on a scale of 1 to 10 (1 being the least severe pain and 10 being the most intense pain)  : 5/10  In the past 6 months, have you lost or gained 10 pounds without trying?: No  One or more falls in the last year: No  In the past 6 months, have you accidentally leaked urine?: No  Do you have trouble with the stairs inside or outside your home?: Yes  Does your home have working smoke alarms?: Yes  Does your home have a carbon monoxide monitor?: No  Which safety hazards (if any) have you experienced in your home? Please select all that apply : none  How would you describe your current diet? Please select all that apply : Regular, No Added Salt  In addition to prescription medications, are you taking any over-the-counter supplements?: Yes  If yes, what supplements are you taking?: Vitamin C     Tylenol arthritis or Advil  Can you manage your medications?: Yes  Are you currently taking any opioid medications?: No  Can you walk and transfer into and out of your bed and chair?: Yes  Can you dress and groom yourself?: Yes  Can you bathe or shower yourself?: Yes  Can you feed yourself?: Yes  Can you do your laundry/ housekeeping?: Yes  Can you manage your money, pay your bills, and track your expenses?: Yes  Can you make your own meals?: Yes  Can you do your own shopping?: Yes  Within the last 12 months, have you had any hospitalizations or Emergency Department visits?: No  Do you have a living will?: No  Do you have a Durable POA (Power of ) for healthcare decisions?: No  Do you have an Advanced Directive for end of life decisions?: No  How often have you used an illegal drug (including marijuana) or a prescription medication for non-medical reasons in the past year?: never  What is the typical number of drinks you consume in a day?: 0  What is the typical number of drinks you consume in a week?: 0  How often did you have a drink containing alcohol in the past year?: monthly or less  How many drinks did you have on a typical day  when you were drinking in the past year?: 1 to 2  How often did you have 6 or more drinks on one occasion in the past year?: never

## 2022-04-18 NOTE — PATIENT INSTRUCTIONS
Medicare Preventive Visit Patient Instructions  Thank you for completing your Welcome to Medicare Visit or Medicare Annual Wellness Visit today  Your next wellness visit will be due in one year (4/19/2023)  The screening/preventive services that you may require over the next 5-10 years are detailed below  Some tests may not apply to you based off risk factors and/or age  Screening tests ordered at today's visit but not completed yet may show as past due  Also, please note that scanned in results may not display below  Preventive Screenings:  Service Recommendations Previous Testing/Comments   Colorectal Cancer Screening  * Colonoscopy    * Fecal Occult Blood Test (FOBT)/Fecal Immunochemical Test (FIT)  * Fecal DNA/Cologuard Test  * Flexible Sigmoidoscopy Age: 54-65 years old   Colonoscopy: every 10 years (may be performed more frequently if at higher risk)  OR  FOBT/FIT: every 1 year  OR  Cologuard: every 3 years  OR  Sigmoidoscopy: every 5 years  Screening may be recommended earlier than age 48 if at higher risk for colorectal cancer  Also, an individualized decision between you and your healthcare provider will decide whether screening between the ages of 74-80 would be appropriate  Colonoscopy: 03/01/2021  FOBT/FIT: Not on file  Cologuard: 03/01/2021  Sigmoidoscopy: Not on file    Screening Current     Breast Cancer Screening Age: 36 years old  Frequency: every 1-2 years  Not required if history of left and right mastectomy Mammogram: Not on file        Cervical Cancer Screening Between the ages of 21-29, pap smear recommended once every 3 years  Between the ages of 33-67, can perform pap smear with HPV co-testing every 5 years     Recommendations may differ for women with a history of total hysterectomy, cervical cancer, or abnormal pap smears in past  Pap Smear: 07/23/2015    Screening Not Indicated   Hepatitis C Screening Once for adults born between 1945 and 1965  More frequently in patients at high risk for Hepatitis C Hep C Antibody: Not on file    Screening Current   Diabetes Screening 1-2 times per year if you're at risk for diabetes or have pre-diabetes Fasting glucose: 110 mg/dL   A1C: No results in last 5 years    Screening Current   Cholesterol Screening Once every 5 years if you don't have a lipid disorder  May order more often based on risk factors  Lipid panel: 08/31/2021    Screening Not Indicated  History Lipid Disorder     Other Preventive Screenings Covered by Medicare:  1  Abdominal Aortic Aneurysm (AAA) Screening: covered once if your at risk  You're considered to be at risk if you have a family history of AAA  2  Lung Cancer Screening: covers low dose CT scan once per year if you meet all of the following conditions: (1) Age 50-69; (2) No signs or symptoms of lung cancer; (3) Current smoker or have quit smoking within the last 15 years; (4) You have a tobacco smoking history of at least 30 pack years (packs per day multiplied by number of years you smoked); (5) You get a written order from a healthcare provider  3  Glaucoma Screening: covered annually if you're considered high risk: (1) You have diabetes OR (2) Family history of glaucoma OR (3)  aged 48 and older OR (3)  American aged 72 and older  3  Osteoporosis Screening: covered every 2 years if you meet one of the following conditions: (1) You're estrogen deficient and at risk for osteoporosis based off medical history and other findings; (2) Have a vertebral abnormality; (3) On glucocorticoid therapy for more than 3 months; (4) Have primary hyperparathyroidism; (5) On osteoporosis medications and need to assess response to drug therapy  · Last bone density test (DXA Scan): Not on file  5  HIV Screening: covered annually if you're between the age of 12-76  Also covered annually if you are younger than 13 and older than 72 with risk factors for HIV infection   For pregnant patients, it is covered up to 3 times per pregnancy  Immunizations:  Immunization Recommendations   Influenza Vaccine Annual influenza vaccination during flu season is recommended for all persons aged >= 6 months who do not have contraindications   Pneumococcal Vaccine (Prevnar and Pneumovax)  * Prevnar = PCV13  * Pneumovax = PPSV23   Adults 25-60 years old: 1-3 doses may be recommended based on certain risk factors  Adults 72 years old: Prevnar (PCV13) vaccine recommended followed by Pneumovax (PPSV23) vaccine  If already received PPSV23 since turning 65, then PCV13 recommended at least one year after PPSV23 dose  Hepatitis B Vaccine 3 dose series if at intermediate or high risk (ex: diabetes, end stage renal disease, liver disease)   Tetanus (Td) Vaccine - COST NOT COVERED BY MEDICARE PART B Following completion of primary series, a booster dose should be given every 10 years to maintain immunity against tetanus  Td may also be given as tetanus wound prophylaxis  Tdap Vaccine - COST NOT COVERED BY MEDICARE PART B Recommended at least once for all adults  For pregnant patients, recommended with each pregnancy  Shingles Vaccine (Shingrix) - COST NOT COVERED BY MEDICARE PART B  2 shot series recommended in those aged 48 and above     Health Maintenance Due:      Topic Date Due    DXA SCAN  Never done    Breast Cancer Screening: Mammogram  Never done    HIV Screening  10/13/2022 (Originally 8/18/1970)    Hepatitis C Screening  11/12/2022 (Originally 1955)    Colorectal Cancer Screening  03/01/2024     Immunizations Due:      Topic Date Due    COVID-19 Vaccine (2 - Booster for [employer] series) 06/03/2021    Influenza Vaccine (1) 09/01/2021     Advance Directives   What are advance directives? Advance directives are legal documents that state your wishes and plans for medical care  These plans are made ahead of time in case you lose your ability to make decisions for yourself   Advance directives can apply to any medical decision, such as the treatments you want, and if you want to donate organs  What are the types of advance directives? There are many types of advance directives, and each state has rules about how to use them  You may choose a combination of any of the following:  · Living will: This is a written record of the treatment you want  You can also choose which treatments you do not want, which to limit, and which to stop at a certain time  This includes surgery, medicine, IV fluid, and tube feedings  · Durable power of  for healthcare [facility]): This is a written record that states who you want to make healthcare choices for you when you are unable to make them for yourself  This person, called a proxy, is usually a family member or a friend  You may choose more than 1 proxy  · Do not resuscitate (DNR) order:  A DNR order is used in case your heart stops beating or you stop breathing  It is a request not to have certain forms of treatment, such as CPR  A DNR order may be included in other types of advance directives  · Medical directive: This covers the care that you want if you are in a coma, near death, or unable to make decisions for yourself  You can list the treatments you want for each condition  Treatment may include pain medicine, surgery, blood transfusions, dialysis, IV or tube feedings, and a ventilator (breathing machine)  · Values history: This document has questions about your views, beliefs, and how you feel and think about life  This information can help others choose the care that you would choose  Why are advance directives important? An advance directive helps you control your care  Although spoken wishes may be used, it is better to have your wishes written down  Spoken wishes can be misunderstood, or not followed  Treatments may be given even if you do not want them  An advance directive may make it easier for your family to make difficult choices about your care     Weight Management   Why it is important to manage your weight:  Being overweight increases your risk of health conditions such as heart disease, high blood pressure, type 2 diabetes, and certain types of cancer  It can also increase your risk for osteoarthritis, sleep apnea, and other respiratory problems  Aim for a slow, steady weight loss  Even a small amount of weight loss can lower your risk of health problems  How to lose weight safely:  A safe and healthy way to lose weight is to eat fewer calories and get regular exercise  You can lose up about 1 pound a week by decreasing the number of calories you eat by 500 calories each day  Healthy meal plan for weight management:  A healthy meal plan includes a variety of foods, contains fewer calories, and helps you stay healthy  A healthy meal plan includes the following:  · Eat whole-grain foods more often  A healthy meal plan should contain fiber  Fiber is the part of grains, fruits, and vegetables that is not broken down by your body  Whole-grain foods are healthy and provide extra fiber in your diet  Some examples of whole-grain foods are whole-wheat breads and pastas, oatmeal, brown rice, and bulgur  · Eat a variety of vegetables every day  Include dark, leafy greens such as spinach, kale, jyoti greens, and mustard greens  Eat yellow and orange vegetables such as carrots, sweet potatoes, and winter squash  · Eat a variety of fruits every day  Choose fresh or canned fruit (canned in its own juice or light syrup) instead of juice  Fruit juice has very little or no fiber  · Eat low-fat dairy foods  Drink fat-free (skim) milk or 1% milk  Eat fat-free yogurt and low-fat cottage cheese  Try low-fat cheeses such as mozzarella and other reduced-fat cheeses  · Choose meat and other protein foods that are low in fat  Choose beans or other legumes such as split peas or lentils  Choose fish, skinless poultry (chicken or turkey), or lean cuts of red meat (beef or pork)   Before you cook meat or poultry, cut off any visible fat  · Use less fat and oil  Try baking foods instead of frying them  Add less fat, such as margarine, sour cream, regular salad dressing and mayonnaise to foods  Eat fewer high-fat foods  Some examples of high-fat foods include french fries, doughnuts, ice cream, and cakes  · Eat fewer sweets  Limit foods and drinks that are high in sugar  This includes candy, cookies, regular soda, and sweetened drinks  Exercise:  Exercise at least 30 minutes per day on most days of the week  Some examples of exercise include walking, biking, dancing, and swimming  You can also fit in more physical activity by taking the stairs instead of the elevator or parking farther away from stores  Ask your healthcare provider about the best exercise plan for you  © Copyright [employer] 2018 Information is for End User's use only and may not be sold, redistributed or otherwise used for commercial purposes  All illustrations and images included in CareNotes® are the copyrighted property of [employer]  or [facility] Preventive Visit Patient Instructions  Thank you for completing your Welcome to Medicare Visit or Medicare Annual Wellness Visit today  Your next wellness visit will be due in one year (5/24/2023)  The screening/preventive services that you may require over the next 5-10 years are detailed below  Some tests may not apply to you based off risk factors and/or age  Screening tests ordered at today's visit but not completed yet may show as past due  Also, please note that scanned in results may not display below    Preventive Screenings:  Service Recommendations Previous Testing/Comments   Colorectal Cancer Screening  * Colonoscopy    * Fecal Occult Blood Test (FOBT)/Fecal Immunochemical Test (FIT)  * Fecal DNA/Cologuard Test  * Flexible Sigmoidoscopy Age: 54-65 years old   Colonoscopy: every 10 years (may be performed more frequently if at higher risk) OR  FOBT/FIT: every 1 year  OR  Cologuard: every 3 years  OR  Sigmoidoscopy: every 5 years  Screening may be recommended earlier than age 48 if at higher risk for colorectal cancer  Also, an individualized decision between you and your healthcare provider will decide whether screening between the ages of 74-80 would be appropriate  Colonoscopy: 03/01/2021  FOBT/FIT: Not on file  Cologuard: 03/01/2021  Sigmoidoscopy: Not on file    Screening Current     Breast Cancer Screening Age: 36 years old  Frequency: every 1-2 years  Not required if history of left and right mastectomy Mammogram: Not on file        Cervical Cancer Screening Between the ages of 21-29, pap smear recommended once every 3 years  Between the ages of 33-67, can perform pap smear with HPV co-testing every 5 years  Recommendations may differ for women with a history of total hysterectomy, cervical cancer, or abnormal pap smears in past  Pap Smear: 07/23/2015    Screening Not Indicated   Hepatitis C Screening Once for adults born between 1945 and 1965  More frequently in patients at high risk for Hepatitis C Hep C Antibody: Not on file    Screening Current   Diabetes Screening 1-2 times per year if you're at risk for diabetes or have pre-diabetes Fasting glucose: 110 mg/dL   A1C: No results in last 5 years    Screening Current   Cholesterol Screening Once every 5 years if you don't have a lipid disorder  May order more often based on risk factors  Lipid panel: 08/31/2021    Screening Not Indicated  History Lipid Disorder     Other Preventive Screenings Covered by Medicare:  6  Abdominal Aortic Aneurysm (AAA) Screening: covered once if your at risk  You're considered to be at risk if you have a family history of AAA    7  Lung Cancer Screening: covers low dose CT scan once per year if you meet all of the following conditions: (1) Age 50-69; (2) No signs or symptoms of lung cancer; (3) Current smoker or have quit smoking within the last 15 years; (4) You have a tobacco smoking history of at least 30 pack years (packs per day multiplied by number of years you smoked); (5) You get a written order from a healthcare provider  8  Glaucoma Screening: covered annually if you're considered high risk: (1) You have diabetes OR (2) Family history of glaucoma OR (3)  aged 48 and older OR (3)  American aged 72 and older  5  Osteoporosis Screening: covered every 2 years if you meet one of the following conditions: (1) You're estrogen deficient and at risk for osteoporosis based off medical history and other findings; (2) Have a vertebral abnormality; (3) On glucocorticoid therapy for more than 3 months; (4) Have primary hyperparathyroidism; (5) On osteoporosis medications and need to assess response to drug therapy  · Last bone density test (DXA Scan): Not on file  10  HIV Screening: covered annually if you're between the age of 12-76  Also covered annually if you are younger than 13 and older than 72 with risk factors for HIV infection  For pregnant patients, it is covered up to 3 times per pregnancy  Immunizations:  Immunization Recommendations   Influenza Vaccine Annual influenza vaccination during flu season is recommended for all persons aged >= 6 months who do not have contraindications   Pneumococcal Vaccine (Prevnar and Pneumovax)  * Prevnar = PCV13  * Pneumovax = PPSV23   Adults 25-60 years old: 1-3 doses may be recommended based on certain risk factors  Adults 72 years old: Prevnar (PCV13) vaccine recommended followed by Pneumovax (PPSV23) vaccine  If already received PPSV23 since turning 65, then PCV13 recommended at least one year after PPSV23 dose     Hepatitis B Vaccine 3 dose series if at intermediate or high risk (ex: diabetes, end stage renal disease, liver disease)   Tetanus (Td) Vaccine - COST NOT COVERED BY MEDICARE PART B Following completion of primary series, a booster dose should be given every 10 years to maintain immunity against tetanus  Td may also be given as tetanus wound prophylaxis  Tdap Vaccine - COST NOT COVERED BY MEDICARE PART B Recommended at least once for all adults  For pregnant patients, recommended with each pregnancy  Shingles Vaccine (Shingrix) - COST NOT COVERED BY MEDICARE PART B  2 shot series recommended in those aged 48 and above     Health Maintenance Due:      Topic Date Due    DXA SCAN  Never done    Breast Cancer Screening: Mammogram  Never done    HIV Screening  10/13/2022 (Originally 8/18/1970)    Hepatitis C Screening  11/12/2022 (Originally 1955)    Colorectal Cancer Screening  03/01/2024     Immunizations Due:      Topic Date Due    COVID-19 Vaccine (2 - Booster for [email or web address] series) 06/03/2021     Advance Directives   What are advance directives? Advance directives are legal documents that state your wishes and plans for medical care  These plans are made ahead of time in case you lose your ability to make decisions for yourself  Advance directives can apply to any medical decision, such as the treatments you want, and if you want to donate organs  What are the types of advance directives? There are many types of advance directives, and each state has rules about how to use them  You may choose a combination of any of the following:  · Living will: This is a written record of the treatment you want  You can also choose which treatments you do not want, which to limit, and which to stop at a certain time  This includes surgery, medicine, IV fluid, and tube feedings  · Durable power of  for healthcare [facility]): This is a written record that states who you want to make healthcare choices for you when you are unable to make them for yourself  This person, called a proxy, is usually a family member or a friend  You may choose more than 1 proxy  · Do not resuscitate (DNR) order:  A DNR order is used in case your heart stops beating or you stop breathing   It is a request not to have certain forms of treatment, such as CPR  A DNR order may be included in other types of advance directives  · Medical directive: This covers the care that you want if you are in a coma, near death, or unable to make decisions for yourself  You can list the treatments you want for each condition  Treatment may include pain medicine, surgery, blood transfusions, dialysis, IV or tube feedings, and a ventilator (breathing machine)  · Values history: This document has questions about your views, beliefs, and how you feel and think about life  This information can help others choose the care that you would choose  Why are advance directives important? An advance directive helps you control your care  Although spoken wishes may be used, it is better to have your wishes written down  Spoken wishes can be misunderstood, or not followed  Treatments may be given even if you do not want them  An advance directive may make it easier for your family to make difficult choices about your care  Weight Management   Why it is important to manage your weight:  Being overweight increases your risk of health conditions such as heart disease, high blood pressure, type 2 diabetes, and certain types of cancer  It can also increase your risk for osteoarthritis, sleep apnea, and other respiratory problems  Aim for a slow, steady weight loss  Even a small amount of weight loss can lower your risk of health problems  How to lose weight safely:  A safe and healthy way to lose weight is to eat fewer calories and get regular exercise  You can lose up about 1 pound a week by decreasing the number of calories you eat by 500 calories each day  Healthy meal plan for weight management:  A healthy meal plan includes a variety of foods, contains fewer calories, and helps you stay healthy  A healthy meal plan includes the following:  · Eat whole-grain foods more often  A healthy meal plan should contain fiber   Fiber is the part of grains, fruits, and vegetables that is not broken down by your body  Whole-grain foods are healthy and provide extra fiber in your diet  Some examples of whole-grain foods are whole-wheat breads and pastas, oatmeal, brown rice, and bulgur  · Eat a variety of vegetables every day  Include dark, leafy greens such as spinach, kale, jyoti greens, and mustard greens  Eat yellow and orange vegetables such as carrots, sweet potatoes, and winter squash  · Eat a variety of fruits every day  Choose fresh or canned fruit (canned in its own juice or light syrup) instead of juice  Fruit juice has very little or no fiber  · Eat low-fat dairy foods  Drink fat-free (skim) milk or 1% milk  Eat fat-free yogurt and low-fat cottage cheese  Try low-fat cheeses such as mozzarella and other reduced-fat cheeses  · Choose meat and other protein foods that are low in fat  Choose beans or other legumes such as split peas or lentils  Choose fish, skinless poultry (chicken or turkey), or lean cuts of red meat (beef or pork)  Before you cook meat or poultry, cut off any visible fat  · Use less fat and oil  Try baking foods instead of frying them  Add less fat, such as margarine, sour cream, regular salad dressing and mayonnaise to foods  Eat fewer high-fat foods  Some examples of high-fat foods include french fries, doughnuts, ice cream, and cakes  · Eat fewer sweets  Limit foods and drinks that are high in sugar  This includes candy, cookies, regular soda, and sweetened drinks  Exercise:  Exercise at least 30 minutes per day on most days of the week  Some examples of exercise include walking, biking, dancing, and swimming  You can also fit in more physical activity by taking the stairs instead of the elevator or parking farther away from stores  Ask your healthcare provider about the best exercise plan for you        © Copyright [employer] 2018 Information is for End User's use only and may not be sold, redistributed or otherwise used for commercial purposes   All illustrations and images included in CareNotes® are the copyrighted property of [employer]  or [facility] [employer]ito Hsu

## 2022-05-07 DIAGNOSIS — I10 ESSENTIAL HYPERTENSION: ICD-10-CM

## 2022-05-07 DIAGNOSIS — E03.9 HYPOTHYROIDISM, UNSPECIFIED TYPE: ICD-10-CM

## 2022-05-09 RX ORDER — LEVOTHYROXINE SODIUM 112 MCG
TABLET ORAL
Qty: 90 TABLET | Refills: 1 | Status: SHIPPED | OUTPATIENT
Start: 2022-05-09

## 2022-05-09 RX ORDER — LISINOPRIL 20 MG/1
TABLET ORAL
Qty: 90 TABLET | Refills: 1 | Status: SHIPPED | OUTPATIENT
Start: 2022-05-09

## 2022-05-09 RX ORDER — AMLODIPINE BESYLATE 5 MG/1
TABLET ORAL
Qty: 135 TABLET | Refills: 1 | Status: SHIPPED | OUTPATIENT
Start: 2022-05-09

## 2022-05-23 NOTE — PROGRESS NOTES
Assessment and Plan:     Problem List Items Addressed This Visit        Endocrine    Hypothyroidism    Relevant Orders    TSH, 3rd generation      Other Visit Diagnoses     Welcome to Medicare preventive visit    -  Primary    Need for vaccination        Relevant Orders    Pneumococcal Conjugate Vaccine 20-valent (Pcv20) (Completed)           Preventive health issues were discussed with patient, and age appropriate screening tests were ordered as noted in patient's After Visit Summary  Personalized health advice and appropriate referrals for health education or preventive services given if needed, as noted in patient's After Visit Summary  History of Present Illness:     Patient presents for Welcome to Medicare visit  Patient Care Team:  Corbin Chavez DO as PCP - General     Review of Systems:     Review of Systems   Constitutional: Negative for appetite change, chills and fever  HENT: Negative for ear pain, facial swelling, rhinorrhea, sinus pain, sore throat and trouble swallowing  Eyes: Negative for discharge and redness  Respiratory: Negative for chest tightness, shortness of breath and wheezing  Cardiovascular: Negative for chest pain and palpitations  Gastrointestinal: Negative for abdominal pain, diarrhea, nausea and vomiting  Endocrine: Negative for polyuria  Genitourinary: Negative for dysuria and urgency  Musculoskeletal: Negative for arthralgias and back pain  Skin: Negative for rash  Neurological: Negative for dizziness, weakness and headaches  Hematological: Negative for adenopathy  Psychiatric/Behavioral: Negative for behavioral problems, confusion and sleep disturbance  All other systems reviewed and are negative       Problem List:     Patient Active Problem List   Diagnosis    Anxiety    Hypertension    Hypothyroidism    Insomnia    Obesity    Vitamin D deficiency    Sacroiliitis ([facility] [address] )      Past Medical and Surgical History:     Past Medical History:   Diagnosis Date    Disease of thyroid gland     Hypertension      Past Surgical History:   Procedure Laterality Date     SECTION      [place] LAP,CHOLECYSTECTOMY N/A 11/15/2017    Procedure: CHOLECYSTECTOMY LAPAROSCOPIC, umbilical hernia repair;  Surgeon: Sylvia Liu MD;  Location: [facility];  Service: General    TONSILLECTOMY        Family History:     Family History   Problem Relation Age of Onset    No Known Problems Mother     Diabetes Father         Mellitus    Heart disease Father     Hypertension Father       Social History:     Social History     Socioeconomic History    Marital status: /Civil Union     Spouse name: None    Number of children: None    Years of education: None    Highest education level: None   Occupational History    None   Tobacco Use    Smoking status: Never Smoker    Smokeless tobacco: Former User   Substance and Sexual Activity    Alcohol use: No    Drug use: No    Sexual activity: None   Other Topics Concern    None   Social History Narrative    Denied: History of daily coffee consumption    Daily cola consumption     Social Determinants of Health     Financial Resource Strain: Not on file   Food Insecurity: Not on file   Transportation Needs: Not on file   Physical Activity: Not on file   Stress: Not on file   Social Connections: Not on file   Intimate Partner Violence: Not on file   Housing Stability: Not on file      Medications and Allergies:     Current Outpatient Medications   Medication Sig Dispense Refill    Ascorbic Acid (VITAMIN C) 500 MG CAPS Take by mouth      Cetirizine HCl (ZYRTEC ALLERGY) 10 MG CAPS Take by mouth daily       Cholecalciferol ([employer] D3) 125 MCG (5000 UT) capsule TAKE 1 CAPSULE BY MOUTH EVERY DAY 30 capsule 3    famotidine (PEPCID) 40 MG tablet TAKE 1 TABLET BY MOUTH EVERY DAY 90 tablet 3    fluticasone (FLONASE) 50 mcg/act nasal spray 2 sprays into each nostril daily (Patient taking differently: 2 sprays into each nostril as needed ) 15 8 g 5    gabapentin (NEURONTIN) 300 mg capsule TAKE 1 CAPSULE BY MOUTH THREE TIMES A DAY 90 capsule 3    meclizine (ANTIVERT) 25 mg tablet Take 1 tablet (25 mg total) by mouth 3 (three) times a day as needed for dizziness 30 tablet 0    potassium chloride (MICRO-K) 10 MEQ CR capsule TAKE 2 CAPSULES (20 MEQ TOTAL) BY MOUTH DAILY 180 capsule 3    spironolactone (ALDACTONE) 25 mg tablet TAKE 1 TABLET BY MOUTH EVERY DAY (Patient taking differently: as needed ) 90 tablet 3    amLODIPine (NORVASC) 5 mg tablet TAKE 1 AND 1/2 TABLET BY MOUTH DAILY 135 tablet 1    lisinopril (ZESTRIL) 20 mg tablet TAKE 1 TABLET BY MOUTH EVERY DAY 90 tablet 1    Synthroid 112 MCG tablet TAKE 1 TABLET (112 MCG TOTAL) BY MOUTH DAILY IN THE EARLY MORNING 90 tablet 1     No current facility-administered medications for this visit  Allergies   Allergen Reactions    Morphine GI Intolerance     Reaction Date: 29Aug2011;     Escitalopram Rash     Reaction Date: 29Aug2011;       Immunizations:     Immunization History   Administered Date(s) Administered    COVID-19 J&J ([employer]) vaccine 0 5 mL 04/08/2021    Influenza, high dose seasonal 0 7 mL 10/22/2019    Influenza, recombinant, quadrivalent,injectable, preservative free 10/12/2020    Pneumococcal Conjugate Vaccine 20-valent (Pcv20), Polysace 04/18/2022    Pneumococcal Polysaccharide PPV23 08/30/2021    Tdap 04/13/2017      Health Maintenance:         Topic Date Due    DXA SCAN  Never done    Breast Cancer Screening: Mammogram  Never done    HIV Screening  10/13/2022 (Originally 8/18/1970)    Hepatitis C Screening  11/12/2022 (Originally 1955)    Colorectal Cancer Screening  03/01/2024         Topic Date Due    COVID-19 Vaccine (2 - Booster for Demetrio series) 06/03/2021      Medicare Screening Tests and Risk Assessments:     Zaira Mittal is here for her Initial Wellness visit  Health Risk Assessment:   Patient rates overall health as fair  Patient feels that their physical health rating is slightly worse  Patient is satisfied with their life  Eyesight was rated as same  Hearing was rated as same  Patient feels that their emotional and mental health rating is slightly better  Patients states they are never, rarely angry  Patient states they are often unusually tired/fatigued  Pain experienced in the last 7 days has been some  Patient's pain rating has been 5/10  Patient states that she has experienced no weight loss or gain in last 6 months  Fall Risk Screening: In the past year, patient has experienced: no history of falling in past year      Urinary Incontinence Screening:   Patient has not leaked urine accidently in the last six months  Home Safety:  Patient has trouble with stairs inside or outside of their home  Patient has working smoke alarms and has no working carbon monoxide detector  Home safety hazards include: none  Nutrition:   Current diet is Regular and No Added Salt  Medications:   Patient is currently taking over-the-counter supplements  OTC medications include: Vitamin C  Tylenol arthritis or Advil  Patient is able to manage medications  Activities of Daily Living (ADLs)/Instrumental Activities of Daily Living (IADLs):   Walk and transfer into and out of bed and chair?: Yes  Dress and groom yourself?: Yes    Bathe or shower yourself?: Yes    Feed yourself?  Yes  Do your laundry/housekeeping?: Yes  Manage your money, pay your bills and track your expenses?: Yes  Make your own meals?: Yes    Do your own shopping?: Yes    Previous Hospitalizations:   Any hospitalizations or ED visits within the last 12 months?: No      Advance Care Planning:   Living will: No    Durable POA for healthcare: No    Advanced directive: No    Advanced directive counseling given: Yes    Five wishes given: Yes    Patient declined ACP directive: No    End of Life Decisions reviewed with patient: Yes    Provider agrees with end of life decisions: Yes      Cognitive Screening:   Provider or family/friend/caregiver concerned regarding cognition?: No    PREVENTIVE SCREENINGS      Cardiovascular Screening:    General: Screening Not Indicated, History Lipid Disorder and Screening Current      Diabetes Screening:     General: Screening Current      Colorectal Cancer Screening:     General: Screening Current      Breast Cancer Screening:     General: Screening Current      Cervical Cancer Screening:    General: Risks and Benefits Discussed and Patient Declines      Osteoporosis Screening:    General: Screening Current      Abdominal Aortic Aneurysm (AAA) Screening:        General: Screening Not Indicated      Lung Cancer Screening:     General: Screening Not Indicated      Hepatitis C Screening:    General: Screening Current    Screening, Brief Intervention, and Referral to Treatment (SBIRT)    Screening  Typical number of drinks in a day: 0  Typical number of drinks in a week: 0  Interpretation: Low risk drinking behavior  AUDIT-C Screenin) How often did you have a drink containing alcohol in the past year? monthly or less  2) How many drinks did you have on a typical day when you were drinking in the past year?  1 to 2  3) How often did you have 6 or more drinks on one occasion in the past year? never    AUDIT-C Score: 1  Interpretation: Score 0-2 (female): Negative screen for alcohol misuse    Single Item Drug Screening:  How often have you used an illegal drug (including marijuana) or a prescription medication for non-medical reasons in the past year? never    Single Item Drug Screen Score: 0  Interpretation: Negative screen for possible drug use disorder     Visual Acuity Screening    Right eye Left eye Both eyes   Without correction: 20/50 20/50 20/40   With correction:           Physical Exam:     /90   Pulse 78   Temp 98 °F (36 7 °C)   Ht 5' 5" (1 651 m)   Wt 77 7 kg (171 lb 3 2 oz)   SpO2 97%   BMI 28 49 kg/m²     Physical Exam  Vitals and nursing note reviewed  Constitutional:       General: She is not in acute distress  Appearance: Normal appearance  She is not ill-appearing or diaphoretic  HENT:      Head: Normocephalic and atraumatic  Right Ear: Tympanic membrane, ear canal and external ear normal       Left Ear: Tympanic membrane, ear canal and external ear normal       Nose: Nose normal  No congestion or rhinorrhea  Mouth/Throat:      Mouth: Mucous membranes are moist       Pharynx: Oropharynx is clear  No posterior oropharyngeal erythema  Eyes:      General:         Right eye: No discharge  Left eye: No discharge  Extraocular Movements: Extraocular movements intact  Conjunctiva/sclera: Conjunctivae normal       Pupils: Pupils are equal, round, and reactive to light  Neck:      Vascular: No carotid bruit  Cardiovascular:      Rate and Rhythm: Normal rate and regular rhythm  Pulses: Normal pulses  Heart sounds: Normal heart sounds  No murmur heard  Pulmonary:      Effort: Pulmonary effort is normal  No respiratory distress  Breath sounds: Normal breath sounds  No wheezing or rhonchi  Abdominal:      General: Abdomen is flat  Bowel sounds are normal  There is no distension  Palpations: There is no mass  Tenderness: There is no abdominal tenderness  Musculoskeletal:         General: No swelling or deformity  Normal range of motion  Cervical back: Normal range of motion and neck supple  No rigidity  Right lower leg: No edema  Left lower leg: No edema  Lymphadenopathy:      Cervical: No cervical adenopathy  Skin:     General: Skin is warm and dry  Capillary Refill: Capillary refill takes less than 2 seconds  Coloration: Skin is not jaundiced  Findings: No bruising, erythema or rash  Neurological:      General: No focal deficit present  Mental Status: She is alert and oriented to person, place, and time        Cranial Nerves: No cranial nerve deficit  Sensory: No sensory deficit  Gait: Gait normal       Deep Tendon Reflexes: Reflexes normal    Psychiatric:         Mood and Affect: Mood normal          Behavior: Behavior normal          Thought Content:  Thought content normal          Judgment: Judgment normal           Nino Chen DO

## 2022-05-23 NOTE — PROGRESS NOTES
Assessment and Plan:     Problem List Items Addressed This Visit        Endocrine    Hypothyroidism    Relevant Orders    TSH, 3rd generation      Other Visit Diagnoses     Welcome to Medicare preventive visit    -  Primary    Need for vaccination        Relevant Orders    Pneumococcal Conjugate Vaccine 20-valent (Pcv20) (Completed)           Preventive health issues were discussed with patient, and age appropriate screening tests were ordered as noted in patient's After Visit Summary  Personalized health advice and appropriate referrals for health education or preventive services given if needed, as noted in patient's After Visit Summary       History of Present Illness:     Patient presents for Medicare Annual Wellness visit    Patient Care Team:  Elizabeth Lara DO as PCP - General     Problem List:     Patient Active Problem List   Diagnosis    Anxiety    Hypertension    Hypothyroidism    Insomnia    Obesity    Vitamin D deficiency    Sacroiliitis ([address] )      Past Medical and Surgical History:     Past Medical History:   Diagnosis Date    Disease of thyroid gland     Hypertension      Past Surgical History:   Procedure Laterality Date     SECTION      [place] LAP,CHOLECYSTECTOMY N/A 11/15/2017    Procedure: CHOLECYSTECTOMY LAPAROSCOPIC, umbilical hernia repair;  Surgeon: Lucia Rodrigues MD;  Location: [facility];  Service: General    TONSILLECTOMY        Family History:     Family History   Problem Relation Age of Onset    No Known Problems Mother     Diabetes Father         Mellitus    Heart disease Father     Hypertension Father       Social History:     Social History     Socioeconomic History    Marital status: /Civil Union     Spouse name: None    Number of children: None    Years of education: None    Highest education level: None   Occupational History    None   Tobacco Use    Smoking status: Never Smoker    Smokeless tobacco: Former User   Substance and Sexual Activity    Alcohol use: No    Drug use: No    Sexual activity: None   Other Topics Concern    None   Social History Narrative    Denied: History of daily coffee consumption    Daily cola consumption     Social Determinants of Health     Financial Resource Strain: Not on file   Food Insecurity: Not on file   Transportation Needs: Not on file   Physical Activity: Not on file   Stress: Not on file   Social Connections: Not on file   Intimate Partner Violence: Not on file   Housing Stability: Not on file      Medications and Allergies:     Current Outpatient Medications   Medication Sig Dispense Refill    Ascorbic Acid (VITAMIN C) 500 MG CAPS Take by mouth      Cetirizine HCl (ZYRTEC ALLERGY) 10 MG CAPS Take by mouth daily       Cholecalciferol ([employer]S D3) 125 MCG (5000 UT) capsule TAKE 1 CAPSULE BY MOUTH EVERY DAY 30 capsule 3    famotidine (PEPCID) 40 MG tablet TAKE 1 TABLET BY MOUTH EVERY DAY 90 tablet 3    fluticasone (FLONASE) 50 mcg/act nasal spray 2 sprays into each nostril daily (Patient taking differently: 2 sprays into each nostril as needed ) 15 8 g 5    gabapentin (NEURONTIN) 300 mg capsule TAKE 1 CAPSULE BY MOUTH THREE TIMES A DAY 90 capsule 3    meclizine (ANTIVERT) 25 mg tablet Take 1 tablet (25 mg total) by mouth 3 (three) times a day as needed for dizziness 30 tablet 0    potassium chloride (MICRO-K) 10 MEQ CR capsule TAKE 2 CAPSULES (20 MEQ TOTAL) BY MOUTH DAILY 180 capsule 3    spironolactone (ALDACTONE) 25 mg tablet TAKE 1 TABLET BY MOUTH EVERY DAY (Patient taking differently: as needed ) 90 tablet 3    amLODIPine (NORVASC) 5 mg tablet TAKE 1 AND 1/2 TABLET BY MOUTH DAILY 135 tablet 1    lisinopril (ZESTRIL) 20 mg tablet TAKE 1 TABLET BY MOUTH EVERY DAY 90 tablet 1    Synthroid 112 MCG tablet TAKE 1 TABLET (112 MCG TOTAL) BY MOUTH DAILY IN THE EARLY MORNING 90 tablet 1     No current facility-administered medications for this visit       Allergies   Allergen Reactions    Morphine GI Intolerance     Reaction Date: 29Aug2011;     Escitalopram Rash     Reaction Date: 29Aug2011;       Immunizations:     Immunization History   Administered Date(s) Administered    COVID-19 J&J ("[employer] vaccine 0 5 mL 04/08/2021    Influenza, high dose seasonal 0 7 mL 10/22/2019    Influenza, recombinant, quadrivalent,injectable, preservative free 10/12/2020    Pneumococcal Conjugate Vaccine 20-valent (Pcv20), Polysace 04/18/2022    Pneumococcal Polysaccharide PPV23 08/30/2021    Tdap 04/13/2017      Health Maintenance:         Topic Date Due    DXA SCAN  Never done    Breast Cancer Screening: Mammogram  Never done    HIV Screening  10/13/2022 (Originally 8/18/1970)    Hepatitis C Screening  11/12/2022 (Originally 1955)    Colorectal Cancer Screening  03/01/2024         Topic Date Due    COVID-19 Vaccine (2 - Booster for [employer] series) 06/03/2021      Medicare Health Risk Assessment:     /90   Pulse 78   Temp 98 °F (36 7 °C)   Ht 5' 5" (1 651 m)   Wt 77 7 kg (171 lb 3 2 oz)   SpO2 97%   BMI 28 49 kg/m²      Celsa Cartagena is here for her Welcome to Medicare visit  Health Risk Assessment:   Patient rates overall health as fair  Patient feels that their physical health rating is slightly worse  Patient is satisfied with their life  Eyesight was rated as same  Hearing was rated as same  Patient feels that their emotional and mental health rating is slightly better  Patients states they are never, rarely angry  Patient states they are often unusually tired/fatigued  Pain experienced in the last 7 days has been some  Patient's pain rating has been 5/10  Patient states that she has experienced no weight loss or gain in last 6 months  Fall Risk Screening: In the past year, patient has experienced: no history of falling in past year      Urinary Incontinence Screening:   Patient has not leaked urine accidently in the last six months       Home Safety:  Patient has trouble with stairs inside or outside of their home  Patient has working smoke alarms and has no working carbon monoxide detector  Home safety hazards include: none  Nutrition:   Current diet is Regular and No Added Salt  Medications:   Patient is currently taking over-the-counter supplements  OTC medications include: Vitamin C  Tylenol arthritis or Advil  Patient is able to manage medications  Activities of Daily Living (ADLs)/Instrumental Activities of Daily Living (IADLs):   Walk and transfer into and out of bed and chair?: Yes  Dress and groom yourself?: Yes    Bathe or shower yourself?: Yes    Feed yourself? Yes  Do your laundry/housekeeping?: Yes  Manage your money, pay your bills and track your expenses?: Yes  Make your own meals?: Yes    Do your own shopping?: Yes    Previous Hospitalizations:   Any hospitalizations or ED visits within the last 12 months?: No      Advance Care Planning:   Living will: No    Durable POA for healthcare: No    Advanced directive: No      PREVENTIVE SCREENINGS      Cardiovascular Screening:    General: Screening Not Indicated and History Lipid Disorder      Diabetes Screening:     General: Screening Current      Colorectal Cancer Screening:     General: Screening Current      Cervical Cancer Screening:    General: Screening Not Indicated      Lung Cancer Screening:     General: Screening Not Indicated      Hepatitis C Screening:    General: Screening Current    Screening, Brief Intervention, and Referral to Treatment (SBIRT)    Screening  Typical number of drinks in a day: 0  Typical number of drinks in a week: 0  Interpretation: Low risk drinking behavior  AUDIT-C Screenin) How often did you have a drink containing alcohol in the past year? monthly or less  2) How many drinks did you have on a typical day when you were drinking in the past year?  1 to 2  3) How often did you have 6 or more drinks on one occasion in the past year? never    AUDIT-C Score: 1  Interpretation: Score 0-2 (female): Negative screen for alcohol misuse    Single Item Drug Screening:  How often have you used an illegal drug (including marijuana) or a prescription medication for non-medical reasons in the past year? never    Single Item Drug Screen Score: 0  Interpretation: Negative screen for possible drug use disorder      Odalis Barrett, DO

## 2022-05-28 DIAGNOSIS — K21.9 GASTROESOPHAGEAL REFLUX DISEASE WITHOUT ESOPHAGITIS: ICD-10-CM

## 2022-05-29 DIAGNOSIS — R60.9 EDEMA, UNSPECIFIED TYPE: ICD-10-CM

## 2022-05-31 RX ORDER — POTASSIUM CHLORIDE 750 MG/1
20 CAPSULE, EXTENDED RELEASE ORAL DAILY
Qty: 180 CAPSULE | Refills: 3 | Status: SHIPPED | OUTPATIENT
Start: 2022-05-31

## 2022-05-31 RX ORDER — FAMOTIDINE 40 MG/1
TABLET, FILM COATED ORAL
Qty: 90 TABLET | Refills: 3 | Status: SHIPPED | OUTPATIENT
Start: 2022-05-31

## 2022-06-03 ENCOUNTER — TELEPHONE (OUTPATIENT)
Dept: FAMILY MEDICINE CLINIC | Facility: CLINIC | Age: 67
End: 2022-06-03

## 2022-06-03 DIAGNOSIS — J06.9 UPPER RESPIRATORY TRACT INFECTION, UNSPECIFIED TYPE: Primary | ICD-10-CM

## 2022-06-03 RX ORDER — CEFUROXIME AXETIL 500 MG/1
500 TABLET ORAL EVERY 12 HOURS SCHEDULED
Qty: 20 TABLET | Refills: 0 | Status: SHIPPED | OUTPATIENT
Start: 2022-06-03 | End: 2022-06-13

## 2022-06-03 RX ORDER — PREDNISONE 10 MG/1
TABLET ORAL
Qty: 26 TABLET | Refills: 0 | Status: SHIPPED | OUTPATIENT
Start: 2022-06-03 | End: 2022-06-27

## 2022-06-03 RX ORDER — BROMPHENIRAMINE MALEATE, PSEUDOEPHEDRINE HYDROCHLORIDE, AND DEXTROMETHORPHAN HYDROBROMIDE 2; 30; 10 MG/5ML; MG/5ML; MG/5ML
5 SYRUP ORAL 4 TIMES DAILY PRN
Qty: 180 ML | Refills: 0 | Status: SHIPPED | OUTPATIENT
Start: 2022-06-03 | End: 2022-06-28 | Stop reason: ALTCHOICE

## 2022-06-03 NOTE — TELEPHONE ENCOUNTER
Symptoms as of 6/2/22:  Sore throat, dry cough, chills, wheezing, ear pain, headache, fatigue  Patient is vaccinated  Patient is taking at home COVID test and will call with results  Patient states that a zpac does not work  She can only take certain antibiotics  Patient asking for medication to be sent to   [address] [place] [address]  She is also asking when she can be seen in the office  Please advise

## 2022-06-06 ENCOUNTER — TELEPHONE (OUTPATIENT)
Dept: FAMILY MEDICINE CLINIC | Facility: CLINIC | Age: 67
End: 2022-06-06

## 2022-06-06 ENCOUNTER — OFFICE VISIT (OUTPATIENT)
Dept: FAMILY MEDICINE CLINIC | Facility: CLINIC | Age: 67
End: 2022-06-06
Payer: MEDICARE

## 2022-06-06 VITALS
WEIGHT: 173.6 LBS | DIASTOLIC BLOOD PRESSURE: 70 MMHG | SYSTOLIC BLOOD PRESSURE: 120 MMHG | HEIGHT: 65 IN | BODY MASS INDEX: 28.92 KG/M2 | OXYGEN SATURATION: 98 % | TEMPERATURE: 97.1 F | HEART RATE: 76 BPM

## 2022-06-06 DIAGNOSIS — J20.9 ACUTE BRONCHITIS, UNSPECIFIED ORGANISM: Primary | ICD-10-CM

## 2022-06-06 PROCEDURE — 99213 OFFICE O/P EST LOW 20 MIN: CPT | Performed by: FAMILY MEDICINE

## 2022-06-06 RX ORDER — DOXYCYCLINE HYCLATE 100 MG/1
100 CAPSULE ORAL EVERY 12 HOURS SCHEDULED
Qty: 20 CAPSULE | Refills: 0 | Status: SHIPPED | OUTPATIENT
Start: 2022-06-06 | End: 2022-06-16

## 2022-06-06 RX ORDER — HYDROCODONE POLISTIREX AND CHLORPHENIRAMINE POLISTIREX 10; 8 MG/5ML; MG/5ML
5 SUSPENSION, EXTENDED RELEASE ORAL EVERY 12 HOURS PRN
Qty: 120 ML | Refills: 0 | Status: SHIPPED | OUTPATIENT
Start: 2022-06-06 | End: 2022-08-04 | Stop reason: ALTCHOICE

## 2022-06-06 RX ORDER — PREDNISONE 10 MG/1
TABLET ORAL
Qty: 26 TABLET | Refills: 0 | Status: SHIPPED | OUTPATIENT
Start: 2022-06-06 | End: 2022-06-09 | Stop reason: SDUPTHER

## 2022-06-07 NOTE — PROGRESS NOTES
Patient ID: Ariel Cali is a 73 y o  female  HPI: 73 y  o female presenting with symptoms of chest congestion, cough and wheezing  Symtpoms for 10 days, ceftin did not fully get rid of symptoms, nor did the medrol pack  Pt tested again or covid and is negative      SUBJECTIVE    Family History   Problem Relation Age of Onset    No Known Problems Mother     Diabetes Father         Mellitus    Heart disease Father     Hypertension Father      Social History     Socioeconomic History    Marital status: /Civil Union     Spouse name: Not on file    Number of children: Not on file    Years of education: Not on file    Highest education level: Not on file   Occupational History    Not on file   Tobacco Use    Smoking status: Never Smoker    Smokeless tobacco: Former User   Substance and Sexual Activity    Alcohol use: No    Drug use: No    Sexual activity: Not on file   Other Topics Concern    Not on file   Social History Narrative    Denied: History of daily coffee consumption    Daily cola consumption     Social Determinants of Health     Financial Resource Strain: Not on file   Food Insecurity: Not on file   Transportation Needs: Not on file   Physical Activity: Not on file   Stress: Not on file   Social Connections: Not on file   Intimate Partner Violence: Not on file   Housing Stability: Not on file     Past Medical History:   Diagnosis Date    Disease of thyroid gland     Hypertension      Past Surgical History:   Procedure Laterality Date     SECTION      [place] LAP,CHOLECYSTECTOMY N/A 11/15/2017    Procedure: CHOLECYSTECTOMY LAPAROSCOPIC, umbilical hernia repair;  Surgeon: Terell Pastor MD;  Location: [facility];  Service: General    TONSILLECTOMY       Allergies   Allergen Reactions    Morphine GI Intolerance     Reaction Date: 2011;     Escitalopram Rash     Reaction Date: 2011;        Current Outpatient Medications:     amLODIPine (NORVASC) 5 mg tablet, TAKE 1 AND 1/2 TABLET BY MOUTH DAILY, Disp: 135 tablet, Rfl: 1    Ascorbic Acid (VITAMIN C) 500 MG CAPS, Take by mouth, Disp: , Rfl:     cefuroxime (CEFTIN) 500 mg tablet, Take 1 tablet (500 mg total) by mouth every 12 (twelve) hours for 10 days, Disp: 20 tablet, Rfl: 0    Cholecalciferol (CVS D3) 125 MCG (5000 UT) capsule, TAKE 1 CAPSULE BY MOUTH EVERY DAY, Disp: 30 capsule, Rfl: 3    doxycycline hyclate (VIBRAMYCIN) 100 mg capsule, Take 1 capsule (100 mg total) by mouth every 12 (twelve) hours for 10 days, Disp: 20 capsule, Rfl: 0    famotidine (PEPCID) 40 MG tablet, TAKE 1 TABLET BY MOUTH EVERY DAY, Disp: 90 tablet, Rfl: 3    gabapentin (NEURONTIN) 300 mg capsule, TAKE 1 CAPSULE BY MOUTH THREE TIMES A DAY, Disp: 90 capsule, Rfl: 3    hydrocodone-chlorpheniramine polistirex (TUSSIONEX) 10-8 mg/5 mL ER suspension, Take 5 mL by mouth every 12 (twelve) hours as needed for cough Max Daily Amount: 10 mL, Disp: 120 mL, Rfl: 0    lisinopril (ZESTRIL) 20 mg tablet, TAKE 1 TABLET BY MOUTH EVERY DAY, Disp: 90 tablet, Rfl: 1    meclizine (ANTIVERT) 25 mg tablet, Take 1 tablet (25 mg total) by mouth 3 (three) times a day as needed for dizziness, Disp: 30 tablet, Rfl: 0    potassium chloride (MICRO-K) 10 MEQ CR capsule, TAKE 2 CAPSULES (20 MEQ TOTAL) BY MOUTH DAILY, Disp: 180 capsule, Rfl: 3    predniSONE 10 mg tablet, 3 tabs po bid x2 days, then 2 tabs po bid x2 days, then 1 tab bid x2 days, then 1 daily until done , Disp: 26 tablet, Rfl: 0    predniSONE 10 mg tablet, 3 tabs po bid x2 days, then 2 tabs po bid x2 days, then 1 tab bid x2 days, then 1 daily until done , Disp: 26 tablet, Rfl: 0    spironolactone (ALDACTONE) 25 mg tablet, TAKE 1 TABLET BY MOUTH EVERY DAY (Patient taking differently: as needed), Disp: 90 tablet, Rfl: 3    Synthroid 112 MCG tablet, TAKE 1 TABLET (112 MCG TOTAL) BY MOUTH DAILY IN THE EARLY MORNING, Disp: 90 tablet, Rfl: 1    brompheniramine-pseudoephedrine-DM 30-2-10 MG/5ML syrup, Take 5 mL by mouth 4 (four) times a day as needed for congestion or cough (Patient not taking: No sig reported), Disp: 180 mL, Rfl: 0    Review of Systems    Constitutional:     Denies fever, chills, fatigue, weakness ,weight loss, weight gain     ENT: Denies earache, loss of hearing, nosebleed, nasal discharge,nasal congestion, sore throat,hoarseness  Pulmonary: Denies shortness of breath , dyspnea on exertion, orthopnea ,but complains of cough, wheezing and pnd  Cardiovascular:  Denies bradycardia , tachycardia ,palpations, lower extremity, edema leg, claudication  Breast:  Denies new or changing breast lumps,  nipple discharge, nipple changes,  Abdomen:  Denies abdominal pain , anorexia ,indigestion, nausea ,vomiting, constipation , diarrhea  Musculoskeletal: Denies myalgias, arthralgias, joint swelling, joint stiffness ,limb pain, limb swelling  Lymph: denies swollen glands  Gu: no dysuria or urinary frequency  Skin: Denies skin rash, skin lesion, skin wound, itching,dry skin  Neuro: Denies headache, numbness, tingling, confusion, loss of consciousness, dizziness ,vertigo  Psychiatric: Denies feelings of depression, suicidal ideation, anxiety, sleep disturbances    OBJECTIVE  /70   Pulse 76   Temp (!) 97 1 °F (36 2 °C)   Ht 5' 5" (1 651 m)   Wt 78 7 kg (173 lb 9 6 oz)   SpO2 98%   BMI 28 89 kg/m²   Constitutional:   NAD, well appearing and well nourished      ENT:   Conjunctiva and lids: no injection, edema, or discharge     Pupils and iris: ELAINE bilaterally    External inspection of ears and nose: normal without deformities or discharge  Otoscopic exam: Canals patent without erythema; tm are dull and erythematous bilaterally       Nasal mucosa, septum and turbinates: Turbinae injection with discharge   Oropharynx:  Moist mucosa, normal tongue and tonsils without lesions  Erythema and injection  of post pharynx with pnd     Pulmonary:Respiratory effort normal rate and rhythm, no increased work of breathing  Auscultation of lungs:  Scattered rhonchi and expiratory wheezes bilaterally      Cardiovascular: regular rate and rhythm, S1 and S2, no murmur, no edema and/or varicosities of LE      Abdomen: Soft and non-distended    Positive bowel sounds    No heptomegaly or splenomegaly    Gu: no suprapubic tenderness, no CVA tenderness, or urethral discharge  Lymphatic: Anterior cervical lymphadenopathy     Muscskeletal:  Gait and station: Normal gait     Digits and nails normal without clubbing or cyanosis      Inspection/palpation of joints, bones, and muscles:  No joint tenderness, swelling, full active and passive range of motion  Skin: Normal skin turgor and no rashes      Neuro:    Normal reflexes       Psych:   alert and oriented to person, place and time     normal mood and affect       Assessment/Plan:Diagnoses and all orders for this visit:    Acute bronchitis, unspecified organism  -     doxycycline hyclate (VIBRAMYCIN) 100 mg capsule; Take 1 capsule (100 mg total) by mouth every 12 (twelve) hours for 10 days  -     predniSONE 10 mg tablet; 3 tabs po bid x2 days, then 2 tabs po bid x2 days, then 1 tab bid x2 days, then 1 daily until done  -     hydrocodone-chlorpheniramine polistirex (TUSSIONEX) 10-8 mg/5 mL ER suspension; Take 5 mL by mouth every 12 (twelve) hours as needed for cough Max Daily Amount: 10 mL        Reviewed with patient plan to treat with above plan      Patient instructed to call in 72 hours if not feeling better or if symptoms worsen

## 2022-06-09 DIAGNOSIS — J20.9 ACUTE BRONCHITIS, UNSPECIFIED ORGANISM: ICD-10-CM

## 2022-06-09 RX ORDER — PREDNISONE 10 MG/1
TABLET ORAL
Qty: 26 TABLET | Refills: 0 | Status: SHIPPED | OUTPATIENT
Start: 2022-06-09 | End: 2022-08-04 | Stop reason: ALTCHOICE

## 2022-06-16 ENCOUNTER — APPOINTMENT (OUTPATIENT)
Dept: LAB | Facility: CLINIC | Age: 67
End: 2022-06-16
Payer: MEDICARE

## 2022-06-16 DIAGNOSIS — E03.9 HYPOTHYROIDISM, UNSPECIFIED TYPE: ICD-10-CM

## 2022-06-16 LAB — TSH SERPL DL<=0.05 MIU/L-ACNC: 0.91 UIU/ML (ref 0.45–4.5)

## 2022-06-16 PROCEDURE — 36415 COLL VENOUS BLD VENIPUNCTURE: CPT

## 2022-06-16 PROCEDURE — 84443 ASSAY THYROID STIM HORMONE: CPT

## 2022-06-27 DIAGNOSIS — J06.9 UPPER RESPIRATORY TRACT INFECTION, UNSPECIFIED TYPE: Primary | ICD-10-CM

## 2022-06-27 RX ORDER — DEXTROMETHORPHAN HYDROBROMIDE AND PROMETHAZINE HYDROCHLORIDE 15; 6.25 MG/5ML; MG/5ML
5 SOLUTION ORAL 4 TIMES DAILY PRN
Qty: 180 ML | Refills: 3 | Status: SHIPPED | OUTPATIENT
Start: 2022-06-27 | End: 2022-08-04 | Stop reason: ALTCHOICE

## 2022-06-27 RX ORDER — METHYLPREDNISOLONE 4 MG/1
TABLET ORAL
Qty: 26 TABLET | Refills: 0 | Status: SHIPPED | OUTPATIENT
Start: 2022-06-27 | End: 2022-08-04 | Stop reason: ALTCHOICE

## 2022-06-28 ENCOUNTER — OFFICE VISIT (OUTPATIENT)
Dept: FAMILY MEDICINE CLINIC | Facility: CLINIC | Age: 67
End: 2022-06-28
Payer: MEDICARE

## 2022-06-28 VITALS
WEIGHT: 169 LBS | SYSTOLIC BLOOD PRESSURE: 122 MMHG | TEMPERATURE: 98.5 F | HEIGHT: 65 IN | DIASTOLIC BLOOD PRESSURE: 76 MMHG | BODY MASS INDEX: 28.16 KG/M2 | HEART RATE: 76 BPM

## 2022-06-28 DIAGNOSIS — Z11.52 ENCOUNTER FOR SCREENING FOR COVID-19: Primary | ICD-10-CM

## 2022-06-28 DIAGNOSIS — J06.9 UPPER RESPIRATORY TRACT INFECTION, UNSPECIFIED TYPE: Primary | ICD-10-CM

## 2022-06-28 DIAGNOSIS — J45.909 UNCOMPLICATED ASTHMA, UNSPECIFIED ASTHMA SEVERITY, UNSPECIFIED WHETHER PERSISTENT: ICD-10-CM

## 2022-06-28 LAB
SARS-COV-2 AG UPPER RESP QL IA: NEGATIVE
VALID CONTROL: NORMAL

## 2022-06-28 PROCEDURE — 99213 OFFICE O/P EST LOW 20 MIN: CPT | Performed by: FAMILY MEDICINE

## 2022-06-28 RX ORDER — AZITHROMYCIN 250 MG/1
TABLET, FILM COATED ORAL
Qty: 11 TABLET | Refills: 0 | Status: SHIPPED | OUTPATIENT
Start: 2022-06-28 | End: 2022-07-02

## 2022-06-28 RX ORDER — FLUTICASONE PROPIONATE AND SALMETEROL 250; 50 UG/1; UG/1
1 POWDER RESPIRATORY (INHALATION) 2 TIMES DAILY
Qty: 60 BLISTER | Refills: 3 | Status: SHIPPED | OUTPATIENT
Start: 2022-06-28

## 2022-06-28 NOTE — PROGRESS NOTES
Patient ID: Razia Al is a 73 y o  female  HPI: 73 y  o female presenting with 7 day history of sore throat, nasal congestion, ear pain,pnd and cough  Pt denies any fever, chills, or body aches  She finished a course of doxycycline and is currently on a steroid taper ad promethazine dm for cough  She states the chest tightness and wheezing have improved        SUBJECTIVE    Family History   Problem Relation Age of Onset    No Known Problems Mother     Diabetes Father         Mellitus    Heart disease Father     Hypertension Father      Social History     Socioeconomic History    Marital status: /Civil Union     Spouse name: Not on file    Number of children: Not on file    Years of education: Not on file    Highest education level: Not on file   Occupational History    Not on file   Tobacco Use    Smoking status: Never Smoker    Smokeless tobacco: Former User   Substance and Sexual Activity    Alcohol use: No    Drug use: No    Sexual activity: Not on file   Other Topics Concern    Not on file   Social History Narrative    Denied: History of daily coffee consumption    Daily cola consumption     Social Determinants of Health     Financial Resource Strain: Not on file   Food Insecurity: Not on file   Transportation Needs: Not on file   Physical Activity: Not on file   Stress: Not on file   Social Connections: Not on file   Intimate Partner Violence: Not on file   Housing Stability: Not on file     Past Medical History:   Diagnosis Date    Disease of thyroid gland     Hypertension      Past Surgical History:   Procedure Laterality Date     SECTION      [place] LAP,CHOLECYSTECTOMY N/A 11/15/2017    Procedure: CHOLECYSTECTOMY LAPAROSCOPIC, umbilical hernia repair;  Surgeon: Milena Beebe MD;  Location: [facility];  Service: General    TONSILLECTOMY       Allergies   Allergen Reactions    Morphine GI Intolerance     Reaction Date: 2011;     Escitalopram Rash     Reaction Date: 29Aug2011;        Current Outpatient Medications:     amLODIPine (NORVASC) 5 mg tablet, TAKE 1 AND 1/2 TABLET BY MOUTH DAILY, Disp: 135 tablet, Rfl: 1    Ascorbic Acid (VITAMIN C) 500 MG CAPS, Take by mouth, Disp: , Rfl:     azithromycin (ZITHROMAX) 250 mg tablet, Take 2 tablets today then 1 tablet daily x 4 days, Disp: 11 tablet, Rfl: 0    Cholecalciferol (CVS D3) 125 MCG (5000 UT) capsule, TAKE 1 CAPSULE BY MOUTH EVERY DAY, Disp: 30 capsule, Rfl: 3    famotidine (PEPCID) 40 MG tablet, TAKE 1 TABLET BY MOUTH EVERY DAY, Disp: 90 tablet, Rfl: 3    Fluticasone-Salmeterol (Advair) 250-50 mcg/dose inhaler, Inhale 1 puff 2 (two) times a day Rinse mouth after use , Disp: 60 blister, Rfl: 3    gabapentin (NEURONTIN) 300 mg capsule, TAKE 1 CAPSULE BY MOUTH THREE TIMES A DAY, Disp: 90 capsule, Rfl: 3    hydrocodone-chlorpheniramine polistirex (TUSSIONEX) 10-8 mg/5 mL ER suspension, Take 5 mL by mouth every 12 (twelve) hours as needed for cough Max Daily Amount: 10 mL, Disp: 120 mL, Rfl: 0    lisinopril (ZESTRIL) 20 mg tablet, TAKE 1 TABLET BY MOUTH EVERY DAY, Disp: 90 tablet, Rfl: 1    meclizine (ANTIVERT) 25 mg tablet, Take 1 tablet (25 mg total) by mouth 3 (three) times a day as needed for dizziness, Disp: 30 tablet, Rfl: 0    methylprednisolone (MEDROL) 4 mg tablet, 3 po bid x2days, 2 po bid x2 days, then 1po bidx2 days, then 1 qd x2 days and stop , Disp: 26 tablet, Rfl: 0    potassium chloride (MICRO-K) 10 MEQ CR capsule, TAKE 2 CAPSULES (20 MEQ TOTAL) BY MOUTH DAILY, Disp: 180 capsule, Rfl: 3    predniSONE 10 mg tablet, 3 tabs po bid x2 days, then 2 tabs po bid x2 days, then 1 tab bid x2 days, then 1 daily until done , Disp: 26 tablet, Rfl: 0    Promethazine-DM (PHENERGAN-DM) 6 25-15 mg/5 mL oral syrup, Take 5 mL by mouth 4 (four) times a day as needed for cough, Disp: 180 mL, Rfl: 3    spironolactone (ALDACTONE) 25 mg tablet, TAKE 1 TABLET BY MOUTH EVERY DAY (Patient taking differently: as needed), Disp: 90 tablet, Rfl: 3    Synthroid 112 MCG tablet, TAKE 1 TABLET (112 MCG TOTAL) BY MOUTH DAILY IN THE EARLY MORNING, Disp: 90 tablet, Rfl: 1    Review of Systems  Constitutional:     Denies fever, chills ,fatigue ,weakness ,weight loss, weight gain     ENT: Denies loss of hearing ,nosebleed, nasal discharge ,hoarseness; but admits to nasal congestion , sore throat and ear pain  Pulmonary: Denies shortness of breath ,dyspnea on exertion, orthopnea ; but admits to cough and pnd  Cardiovascular:  Denies bradycardia , tachycardia  ,palpations, lower extremity edema leg, claudication  Breast:  Denies new or changing breast lumps ,nipple discharge ,nipple changes  Abdomen:  Denies abdominal pain , anorexia , indigestion, nausea, vomiting, constipation, diarrhea  Musculoskeletal: Denies myalgias, arthralgias, joint swelling, joint stiffness , limb pain, limb swelling  Lymph: + swollen glands  Gu: Denies polyuria or dysuria  Skin: Denies skin rash, skin lesion, skin wound, itching, dry skin  Neuro: Denies headache, numbness, tingling, confusion, loss of consciousness, dizziness, vertigo  Psychiatric: Denies feelings of depression, suicidal ideation, anxiety, sleep disturbances    OBJECTIVE  /76   Pulse 76   Temp 98 5 °F (36 9 °C)   Ht 5' 5" (1 651 m)   Wt 76 7 kg (169 lb)   BMI 28 12 kg/m²   Constitutional:   NAD, well appearing and well nourished     ENT:   Conjunctiva and lids: no injection, edema, or discharge    Pupils and iris: ELAINE bilaterally  External inspection of ears and nose: normal without deformities or discharge  Otoscopic exam: Canals patent without erythema, tm dull and erythematous, effusions bilaterally   Nasal mucosa, septum and turbinates: + turbinate injection, nasal discharge         Oropharynx:  Moist mucosa, normal tongue and tonsils without lesions  + erythema and injection of posterior pharynx with pnd    Pulmonary:Respiratory effort normal rate and rhythm, no increased work of breathing  Auscultation of lungs:  Clear bilaterally with no adventitious breath sounds     Cardiovascular: regular rate and rhythm, S1 and S2, no murmur, no edema and/or varicosities of LE     Abdomen: Soft and nontender with + bowel sounds  No heptomegaly or splenomegaly     Gu: no suprapubic tenderness or CVA tenderness  Lymphatic: + anterior  cervical lymphadenopathy      Musculoskeletal:  Gait and station: Normal gait      Digits and nails normal without clubbing or cyanosis      Inspection/palpation of joints, bones, and muscles:  No joint tenderness, swelling, full active and passive range of motion       Skin: Normal skin turgor and no rashes      Neuro:    Normal reflexes  Pych:   alert and oriented to person, place and time     normal mood and affect      Assessment/Plan:Diagnoses and all orders for this visit:    Upper respiratory tract infection, unspecified type  -     azithromycin (ZITHROMAX) 250 mg tablet; Take 2 tablets today then 1 tablet daily x 4 days    Uncomplicated asthma, unspecified asthma severity, unspecified whether persistent  -     Fluticasone-Salmeterol (Advair) 250-50 mcg/dose inhaler; Inhale 1 puff 2 (two) times a day Rinse mouth after use  Reviewed with patient plan to treat with above plan      Patient instructed to call in 72 hours if not feeling better or if symptoms worsen

## 2022-08-04 ENCOUNTER — OFFICE VISIT (OUTPATIENT)
Dept: FAMILY MEDICINE CLINIC | Facility: CLINIC | Age: 67
End: 2022-08-04
Payer: MEDICARE

## 2022-08-04 VITALS
DIASTOLIC BLOOD PRESSURE: 72 MMHG | OXYGEN SATURATION: 98 % | WEIGHT: 172 LBS | HEART RATE: 74 BPM | HEIGHT: 65 IN | SYSTOLIC BLOOD PRESSURE: 122 MMHG | TEMPERATURE: 97 F | BODY MASS INDEX: 28.66 KG/M2
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
## 2022-08-04 DIAGNOSIS — T14.8XXA ABRASION: Primary | ICD-10-CM

## 2022-08-04 PROCEDURE — 99213 OFFICE O/P EST LOW 20 MIN: CPT | Performed by: FAMILY MEDICINE

## 2022-08-04 NOTE — PROGRESS NOTES
Morales Mae [id number] female MRN: [id number]    Acute Visit    Assessment/Plan   Rush Whitehead was seen today for fall  Diagnoses and all orders for this visit:    Abrasion    Fall, initial encounter  Abrasion - applied antibiotic ointment, non-stick dressing and guaze   Advised regarding wound care   Reviewed call precautions    Arias Hobbs MD  [address]  8/4/2022      Please be aware that this note contains text that was dictated and there may be errors pertaining to "sound-alike "words during the dictation process  SUBJECTIVE    CC: Fall (Trip in curb)    HPI:  Morales Mae is a 73 y o  female who presented for an acute visit to discuss an injury by falling  Fall occurred today 8/4/22  She was walking in a parking lot and tripped on a small curb, falling forward mostly onto the right forearm  She denies LOC or head trauma  She did not have any preceding symptoms like dizziness, palpitations, lightheaded  She washed her abrasion on the back of her right forearm with soap and water  She also reports a small dime sized abrasion on the right knee  Review of Systems   Musculoskeletal: Positive for arthralgias and myalgias  Skin: Positive for wound  All other systems reviewed and are negative  Medications:   Meds/Allergies   Current Outpatient Medications   Medication Sig Dispense Refill    amLODIPine (NORVASC) 5 mg tablet TAKE 1 AND 1/2 TABLET BY MOUTH DAILY 135 tablet 1    Ascorbic Acid (VITAMIN C) 500 MG CAPS Take by mouth      Cholecalciferol (CVS D3) 125 MCG (5000 UT) capsule TAKE 1 CAPSULE BY MOUTH EVERY DAY 30 capsule 3    famotidine (PEPCID) 40 MG tablet TAKE 1 TABLET BY MOUTH EVERY DAY 90 tablet 3    Fluticasone-Salmeterol (Advair) 250-50 mcg/dose inhaler Inhale 1 puff 2 (two) times a day Rinse mouth after use   60 blister 3    gabapentin (NEURONTIN) 300 mg capsule TAKE 1 CAPSULE BY MOUTH THREE TIMES A DAY 90 capsule 3    lisinopril (ZESTRIL) 20 mg tablet TAKE 1 TABLET BY MOUTH EVERY DAY 90 tablet 1    meclizine (ANTIVERT) 25 mg tablet Take 1 tablet (25 mg total) by mouth 3 (three) times a day as needed for dizziness 30 tablet 0    potassium chloride (MICRO-K) 10 MEQ CR capsule TAKE 2 CAPSULES (20 MEQ TOTAL) BY MOUTH DAILY 180 capsule 3    spironolactone (ALDACTONE) 25 mg tablet TAKE 1 TABLET BY MOUTH EVERY DAY (Patient taking differently: as needed) 90 tablet 3    Synthroid 112 MCG tablet TAKE 1 TABLET (112 MCG TOTAL) BY MOUTH DAILY IN THE EARLY MORNING 90 tablet 1     No current facility-administered medications for this visit  Allergies   Allergen Reactions    Morphine GI Intolerance     Reaction Date: 29Aug2011;     Escitalopram Rash     Reaction Date: 29Aug2011;        OBJECTIVE    Vitals:   Vitals:    08/04/22 1143   BP: 122/72   Pulse: 74   Temp: (!) 97 °F (36 1 °C)   SpO2: 98%   Weight: 78 kg (172 lb)   Height: 5' 5" (1 651 m)     Physical Exam  Vitals and nursing note reviewed  Constitutional:       General: She is not in acute distress  Appearance: Normal appearance  She is well-developed  She is not ill-appearing or diaphoretic  HENT:      Head: Normocephalic and atraumatic  Right Ear: External ear normal       Left Ear: External ear normal       Nose: Nose normal    Eyes:      General: Lids are normal       Conjunctiva/sclera: Conjunctivae normal    Neck:      Vascular: No JVD  Trachea: No tracheal deviation  Pulmonary:      Effort: No accessory muscle usage or respiratory distress  Musculoskeletal:        Arms:       Comments: Abrasions as marked with associated bruising and swelling   No deformity noted in the upper or lower arm    Skin:     Findings: No rash  Neurological:      Mental Status: She is alert

## 2022-08-29 ENCOUNTER — RA CDI HCC (OUTPATIENT)
Dept: OTHER | Facility: HOSPITAL | Age: 67
End: 2022-08-29

## 2022-08-29 NOTE — PROGRESS NOTES
[address]  coding opportunities       Chart reviewed, no opportunity found: CHART REVIEWED, NO OPPORTUNITY FOUND        Patients Insurance     Medicare Insurance: Medicare

## 2022-08-31 DIAGNOSIS — R60.9 EDEMA, UNSPECIFIED TYPE: ICD-10-CM

## 2022-08-31 RX ORDER — SPIRONOLACTONE 25 MG/1
TABLET ORAL
Qty: 90 TABLET | Refills: 3 | Status: SHIPPED | OUTPATIENT
Start: 2022-08-31 | End: 2022-10-10

## 2022-10-10 ENCOUNTER — OFFICE VISIT (OUTPATIENT)
Dept: FAMILY MEDICINE CLINIC | Facility: CLINIC | Age: 67
End: 2022-10-10
Payer: MEDICARE

## 2022-10-10 VITALS
HEIGHT: 60 IN | RESPIRATION RATE: 17 BRPM | HEART RATE: 74 BPM | OXYGEN SATURATION: 17 % | WEIGHT: 168 LBS | BODY MASS INDEX: 32.98 KG/M2 | TEMPERATURE: 97.3 F

## 2022-10-10 DIAGNOSIS — I10 ESSENTIAL HYPERTENSION: Primary | ICD-10-CM

## 2022-10-10 DIAGNOSIS — R60.9 PERIPHERAL EDEMA: ICD-10-CM

## 2022-10-10 DIAGNOSIS — E03.9 HYPOTHYROIDISM, UNSPECIFIED TYPE: ICD-10-CM

## 2022-10-10 DIAGNOSIS — E78.00 HYPERCHOLESTEROLEMIA: ICD-10-CM

## 2022-10-10 DIAGNOSIS — Z23 NEED FOR VACCINATION: ICD-10-CM

## 2022-10-10 PROCEDURE — 99214 OFFICE O/P EST MOD 30 MIN: CPT | Performed by: FAMILY MEDICINE

## 2022-10-10 PROCEDURE — G0008 ADMIN INFLUENZA VIRUS VAC: HCPCS | Performed by: FAMILY MEDICINE

## 2022-10-10 PROCEDURE — 90662 IIV NO PRSV INCREASED AG IM: CPT | Performed by: FAMILY MEDICINE

## 2022-10-10 RX ORDER — TRIAMTERENE AND HYDROCHLOROTHIAZIDE 37.5; 25 MG/1; MG/1
1 CAPSULE ORAL DAILY PRN
Qty: 30 CAPSULE | Refills: 3 | Status: SHIPPED | OUTPATIENT
Start: 2022-10-10 | End: 2022-10-18 | Stop reason: SDUPTHER

## 2022-10-11 PROBLEM — M46.1 SACROILIITIS (HCC): Status: RESOLVED | Noted: 2021-10-22 | Resolved: 2022-10-11

## 2022-10-11 NOTE — PROGRESS NOTES
Patient ID: Jihan Caballero is a 78 y o  female  HPI: 78 y  o female presents for follow up hypertension,hypothyroidism  and hypercholesterolemia  Pt denies any dizziness,  chest pain, palpitations, or dypsnea with exertion  She complains of edema which does not respond to spironolactone and is worse when she is at her home in [facility] [place]  She denies any orthopnea or dyspnea  Swelling is down in the am and progressively worsens throughout the day  SUBJECTIVE  BMI Counseling: Body mass index is 32 76 kg/m²  The BMI is above normal  Nutrition recommendations include reducing portion sizes, decreasing overall calorie intake and moderation in carbohydrate intake    Family History   Problem Relation Age of Onset   • No Known Problems Mother    • Diabetes Father         Mellitus   • Heart disease Father    • Hypertension Father      Social History     Socioeconomic History   • Marital status: /Civil Union     Spouse name: Not on file   • Number of children: Not on file   • Years of education: Not on file   • Highest education level: Not on file   Occupational History   • Not on file   Tobacco Use   • Smoking status: Never Smoker   • Smokeless tobacco: Former User   Substance and Sexual Activity   • Alcohol use: No   • Drug use: No   • Sexual activity: Not on file   Other Topics Concern   • Not on file   Social History Narrative    Denied: History of daily coffee consumption    Daily cola consumption     Social Determinants of Health     Financial Resource Strain: Not on file   Food Insecurity: Not on file   Transportation Needs: Not on file   Physical Activity: Not on file   Stress: Not on file   Social Connections: Not on file   Intimate Partner Violence: Not on file   Housing Stability: Not on file     Past Medical History:   Diagnosis Date   • Disease of thyroid gland    • Hypertension      Past Surgical History:   Procedure Laterality Date   •  SECTION     • [place] LAP,CHOLECYSTECTOMY N/A 11/15/2017    Procedure: CHOLECYSTECTOMY LAPAROSCOPIC, umbilical hernia repair;  Surgeon: Robert Gomez MD;  Location: [facility];  Service: General   • TONSILLECTOMY       Allergies   Allergen Reactions   • Morphine GI Intolerance     Reaction Date: 29Aug2011;    • Escitalopram Rash     Reaction Date: 29Aug2011;        Current Outpatient Medications:   •  triamterene-hydrochlorothiazide (DYAZIDE) 37 5-25 mg per capsule, Take 1 capsule by mouth daily as needed (swelling), Disp: 30 capsule, Rfl: 3  •  amLODIPine (NORVASC) 5 mg tablet, TAKE 1 AND 1/2 TABLET BY MOUTH DAILY, Disp: 135 tablet, Rfl: 1  •  Ascorbic Acid (VITAMIN C) 500 MG CAPS, Take by mouth, Disp: , Rfl:   •  Cholecalciferol (CVS D3) 125 MCG (5000 UT) capsule, TAKE 1 CAPSULE BY MOUTH EVERY DAY, Disp: 30 capsule, Rfl: 3  •  famotidine (PEPCID) 40 MG tablet, TAKE 1 TABLET BY MOUTH EVERY DAY, Disp: 90 tablet, Rfl: 3  •  Fluticasone-Salmeterol (Advair) 250-50 mcg/dose inhaler, Inhale 1 puff 2 (two) times a day Rinse mouth after use , Disp: 60 blister, Rfl: 3  •  gabapentin (NEURONTIN) 300 mg capsule, TAKE 1 CAPSULE BY MOUTH THREE TIMES A DAY, Disp: 90 capsule, Rfl: 3  •  lisinopril (ZESTRIL) 20 mg tablet, TAKE 1 TABLET BY MOUTH EVERY DAY, Disp: 90 tablet, Rfl: 1  •  meclizine (ANTIVERT) 25 mg tablet, Take 1 tablet (25 mg total) by mouth 3 (three) times a day as needed for dizziness, Disp: 30 tablet, Rfl: 0  •  potassium chloride (MICRO-K) 10 MEQ CR capsule, TAKE 2 CAPSULES (20 MEQ TOTAL) BY MOUTH DAILY, Disp: 180 capsule, Rfl: 3  •  Synthroid 112 MCG tablet, TAKE 1 TABLET (112 MCG TOTAL) BY MOUTH DAILY IN THE EARLY MORNING, Disp: 90 tablet, Rfl: 1    Review of Systems  Constitutional:     Denies fever, chills ,fatigue ,weakness ,weight loss, weight gain     ENT: Denies earache ,loss of hearing ,nosebleed, nasal discharge,nasal congestion ,sore throat ,hoarseness  Pulmonary: Denies shortness of breath ,cough  ,dyspnea on exertion, orthopnea  ,PND Cardiovascular:  Denies bradycardia , tachycardia  ,palpations,+ lower extremity edema leg, claudication  Breast:  Denies new or changing breast lumps ,nipple discharge ,nipple changes  Abdomen:  Denies abdominal pain , anorexia , indigestion, nausea, vomiting, constipation, diarrhea  Musculoskeletal: Denies myalgias, arthralgias, joint swelling, joint stiffness , limb pain, limb swelling  Gu: denies dysuria, polyuria  Skin: Denies skin rash, skin lesion, skin wound, itching, dry skin  Neuro: Denies headache, numbness, tingling, confusion, loss of consciousness, dizziness, vertigo  Psychiatric: Denies feelings of depression, suicidal ideation, anxiety, sleep disturbances    OBJECTIVE  Pulse 74   Temp (!) 97 3 °F (36 3 °C)   Resp 17   Ht 5' 0 05" (1 525 m)   Wt 76 2 kg (168 lb)   SpO2 (!) 17%   BMI 32 76 kg/m²   Constitutional:   NAD, well appearing and well nourished      ENT:   Conjunctiva and lids: no injection, edema, or discharge     Pupils and iris: ELAINE bilaterally  External inspection of ears and nose: normal without deformities or discharge  Otoscopic exam: Canals patent without erythema  Nasal mucosa, septum and turbinates: Normal or edema or discharge         Oropharynx:  Moist mucosa, normal tongue and tonsils without lesions  No erythema        Pulmonary:Respiratory effort normal rate and rhythm, no increased work of breathing   Auscultation of lungs:  Clear bilaterally with no adventitious breath sounds       Cardiovascular: regular rate and rhythm, S1 and S2, no murmur, no edema and/or varicosities of LE      Abdomen: Soft and non-distended     Positive bowel sounds      No heptomegaly or splenomegaly      Gu: no suprapubic tenderness or CVA tenderness, no urethral discharge  Lymphatic:  No anterior or posterior cervical lymphadenopathy         Musculoskeletal:  Gait and station: Normal gait      Digits and nails normal without clubbing or cyanosis       Inspection/palpation of joints, bones, and muscles:  No joint tenderness, swelling, full active and passive range of motion       Skin: Normal skin turgor and no rashes      Neuro:    Normal reflexes      Psych:   alert and oriented to person, place and time     normal mood and affect       Assessment/Plan:Diagnoses and all orders for this visit:    Essential hypertension  Comments:  Continue norvasc therpay   Orders:  -     Comprehensive metabolic panel; Future    Hypercholesterolemia  Comments:  Cotninue statin tx  Orders:  -     Lipid panel; Future    Hypothyroidism, unspecified type  Comments:  Continue levothyroxine tx  Orders:  -     TSH, 3rd generation; Future    Peripheral edema  Comments:  Pt to callwith her response to dyazide tx  Orders:  -     triamterene-hydrochlorothiazide (DYAZIDE) 37 5-25 mg per capsule; Take 1 capsule by mouth daily as needed (swelling)    Need for vaccination  -     influenza vaccine, high-dose, PF 0 7 mL (FLUZONE HIGH-DOSE)        I will see patient back in 6 mos or sooner prn

## 2022-10-18 DIAGNOSIS — R60.9 PERIPHERAL EDEMA: ICD-10-CM

## 2022-10-18 RX ORDER — TRIAMTERENE AND HYDROCHLOROTHIAZIDE 37.5; 25 MG/1; MG/1
1 CAPSULE ORAL DAILY PRN
Qty: 30 CAPSULE | Refills: 3 | Status: SHIPPED | OUTPATIENT
Start: 2022-10-18 | End: 2023-01-16

## 2022-10-24 DIAGNOSIS — M54.50 BACK PAIN, LUMBOSACRAL: ICD-10-CM

## 2022-10-24 DIAGNOSIS — E03.9 HYPOTHYROIDISM, UNSPECIFIED TYPE: ICD-10-CM

## 2022-10-24 DIAGNOSIS — I10 ESSENTIAL HYPERTENSION: ICD-10-CM

## 2022-10-24 RX ORDER — GABAPENTIN 300 MG/1
CAPSULE ORAL
Qty: 90 CAPSULE | Refills: 3 | Status: SHIPPED | OUTPATIENT
Start: 2022-10-24

## 2022-10-24 RX ORDER — LISINOPRIL 20 MG/1
TABLET ORAL
Qty: 90 TABLET | Refills: 1 | Status: SHIPPED | OUTPATIENT
Start: 2022-10-24

## 2022-10-24 RX ORDER — LEVOTHYROXINE SODIUM 112 MCG
TABLET ORAL
Qty: 90 TABLET | Refills: 1 | Status: SHIPPED | OUTPATIENT
Start: 2022-10-24

## 2022-11-10 ENCOUNTER — OFFICE VISIT (OUTPATIENT)
Dept: FAMILY MEDICINE CLINIC | Facility: CLINIC | Age: 67
End: 2022-11-10

## 2022-11-10 VITALS
HEART RATE: 73 BPM | TEMPERATURE: 97.9 F | DIASTOLIC BLOOD PRESSURE: 78 MMHG | BODY MASS INDEX: 33.61 KG/M2 | WEIGHT: 171.2 LBS | SYSTOLIC BLOOD PRESSURE: 124 MMHG | HEIGHT: 60 IN | OXYGEN SATURATION: 98 %

## 2022-11-10 DIAGNOSIS — J06.9 UPPER RESPIRATORY TRACT INFECTION, UNSPECIFIED TYPE: Primary | ICD-10-CM

## 2022-11-10 RX ORDER — CLARITHROMYCIN 500 MG/1
500 TABLET, COATED ORAL EVERY 12 HOURS SCHEDULED
Qty: 20 TABLET | Refills: 0 | Status: SHIPPED | OUTPATIENT
Start: 2022-11-10 | End: 2022-11-20

## 2022-11-10 RX ORDER — METHYLPREDNISOLONE 4 MG/1
TABLET ORAL
Qty: 21 EACH | Refills: 0 | Status: SHIPPED | OUTPATIENT
Start: 2022-11-10

## 2022-11-10 RX ORDER — BENZONATATE 200 MG/1
200 CAPSULE ORAL 3 TIMES DAILY PRN
Qty: 30 CAPSULE | Refills: 0 | Status: SHIPPED | OUTPATIENT
Start: 2022-11-10

## 2022-11-11 NOTE — PROGRESS NOTES
Patient ID: Raina Infante is a 78 y o  female  HPI: 78 y  o female presenting with 7 day history of sore throat, nasal congestion, ear pain,pnd and cough  Pt denies any fever, chills, or body aches  She is negative for covid    Pt is intermittently wheezing and using her albuterol inhaler prn      SUBJECTIVE    Family History   Problem Relation Age of Onset   • No Known Problems Mother    • Diabetes Father         Mellitus   • Heart disease Father    • Hypertension Father      Social History     Socioeconomic History   • Marital status: /Civil Union     Spouse name: Not on file   • Number of children: Not on file   • Years of education: Not on file   • Highest education level: Not on file   Occupational History   • Not on file   Tobacco Use   • Smoking status: Never Smoker   • Smokeless tobacco: Former User   Substance and Sexual Activity   • Alcohol use: No   • Drug use: No   • Sexual activity: Not on file   Other Topics Concern   • Not on file   Social History Narrative    Denied: History of daily coffee consumption    Daily cola consumption     Social Determinants of Health     Financial Resource Strain: Not on file   Food Insecurity: Not on file   Transportation Needs: Not on file   Physical Activity: Not on file   Stress: Not on file   Social Connections: Not on file   Intimate Partner Violence: Not on file   Housing Stability: Not on file     Past Medical History:   Diagnosis Date   • Disease of thyroid gland    • Hypertension      Past Surgical History:   Procedure Laterality Date   •  SECTION     • [place] LAP,CHOLECYSTECTOMY N/A 11/15/2017    Procedure: CHOLECYSTECTOMY LAPAROSCOPIC, umbilical hernia repair;  Surgeon: Colt Birmingham MD;  Location:  MAIN OR;  Service: General   • TONSILLECTOMY       Allergies   Allergen Reactions   • Morphine GI Intolerance     Reaction Date: 2011;    • Escitalopram Rash     Reaction Date: 2011;        Current Outpatient Medications:   •  amLODIPine (NORVASC) 5 mg tablet, TAKE 1 AND 1/2 TABLET BY MOUTH DAILY, Disp: 135 tablet, Rfl: 1  •  Ascorbic Acid (VITAMIN C) 500 MG CAPS, Take by mouth, Disp: , Rfl:   •  benzonatate (TESSALON) 200 MG capsule, Take 1 capsule (200 mg total) by mouth 3 (three) times a day as needed for cough, Disp: 30 capsule, Rfl: 0  •  Cholecalciferol (CVS D3) 125 MCG (5000 UT) capsule, TAKE 1 CAPSULE BY MOUTH EVERY DAY, Disp: 30 capsule, Rfl: 3  •  clarithromycin (BIAXIN) 500 mg tablet, Take 1 tablet (500 mg total) by mouth every 12 (twelve) hours for 10 days, Disp: 20 tablet, Rfl: 0  •  famotidine (PEPCID) 40 MG tablet, TAKE 1 TABLET BY MOUTH EVERY DAY, Disp: 90 tablet, Rfl: 3  •  Fluticasone-Salmeterol (Advair) 250-50 mcg/dose inhaler, Inhale 1 puff 2 (two) times a day Rinse mouth after use , Disp: 60 blister, Rfl: 3  •  gabapentin (NEURONTIN) 300 mg capsule, TAKE 1 CAPSULE BY MOUTH THREE TIMES A DAY, Disp: 90 capsule, Rfl: 3  •  lisinopril (ZESTRIL) 20 mg tablet, TAKE 1 TABLET BY MOUTH EVERY DAY, Disp: 90 tablet, Rfl: 1  •  meclizine (ANTIVERT) 25 mg tablet, Take 1 tablet (25 mg total) by mouth 3 (three) times a day as needed for dizziness, Disp: 30 tablet, Rfl: 0  •  methylPREDNISolone 4 MG tablet therapy pack, Use as directed on package, Disp: 21 each, Rfl: 0  •  potassium chloride (MICRO-K) 10 MEQ CR capsule, TAKE 2 CAPSULES (20 MEQ TOTAL) BY MOUTH DAILY, Disp: 180 capsule, Rfl: 3  •  Synthroid 112 MCG tablet, TAKE 1 TABLET (112 MCG TOTAL) BY MOUTH DAILY IN THE EARLY MORNING, Disp: 90 tablet, Rfl: 1  •  triamterene-hydrochlorothiazide (DYAZIDE) 37 5-25 mg per capsule, Take 1 capsule by mouth daily as needed (swelling), Disp: 30 capsule, Rfl: 3    Review of Systems  Constitutional:     Denies fever, chills ,fatigue ,weakness ,weight loss, weight gain     ENT: Denies loss of hearing ,nosebleed, nasal discharge ,hoarseness; but admits to nasal congestion , sore throat and ear pain      Pulmonary: Denies shortness of breath ,dyspnea on exertion, orthopnea ; but admits to cough and pnd  Cardiovascular:  Denies bradycardia , tachycardia  ,palpations, lower extremity edema leg, claudication  Breast:  Denies new or changing breast lumps ,nipple discharge ,nipple changes  Abdomen:  Denies abdominal pain , anorexia , indigestion, nausea, vomiting, constipation, diarrhea  Musculoskeletal: Denies myalgias, arthralgias, joint swelling, joint stiffness , limb pain, limb swelling  Lymph: + swollen glands  Gu: Denies polyuria or dysuria  Skin: Denies skin rash, skin lesion, skin wound, itching, dry skin  Neuro: Denies headache, numbness, tingling, confusion, loss of consciousness, dizziness, vertigo  Psychiatric: Denies feelings of depression, suicidal ideation, anxiety, sleep disturbances    OBJECTIVE  /78   Pulse 73   Temp 97 9 °F (36 6 °C)   Ht 5' 0 05" (1 525 m)   Wt 77 7 kg (171 lb 3 2 oz)   SpO2 98%   BMI 33 38 kg/m²   Constitutional:   NAD, well appearing and well nourished     ENT:   Conjunctiva and lids: no injection, edema, or discharge    Pupils and iris: ELAINE bilaterally  External inspection of ears and nose: normal without deformities or discharge  Otoscopic exam: Canals patent without erythema, tm dull and erythematous, effusions bilaterally   Nasal mucosa, septum and turbinates: + turbinate injection, nasal discharge         Oropharynx:  Moist mucosa, normal tongue and tonsils without lesions  + erythema and injection of posterior pharynx with pnd    Pulmonary:Respiratory effort normal rate and rhythm, no increased work of breathing   Auscultation of lungs:  + crackles in right lung base   Cardiovascular: regular rate and rhythm, S1 and S2, no murmur, no edema and/or varicosities of LE     Abdomen: Soft and nontender with + bowel sounds  No heptomegaly or splenomegaly     Gu: no suprapubic tenderness or CVA tenderness  Lymphatic: + anterior  cervical lymphadenopathy      Musculoskeletal:  Gait and station: Normal gait      Digits and nails normal without clubbing or cyanosis      Inspection/palpation of joints, bones, and muscles:  No joint tenderness, swelling, full active and passive range of motion       Skin: Normal skin turgor and no rashes      Neuro:    Normal reflexes  Pych:   alert and oriented to person, place and time     normal mood and affect      Assessment/Plan:Diagnoses and all orders for this visit:    Upper respiratory tract infection, unspecified type  -     methylPREDNISolone 4 MG tablet therapy pack; Use as directed on package  -     benzonatate (TESSALON) 200 MG capsule; Take 1 capsule (200 mg total) by mouth 3 (three) times a day as needed for cough  -     clarithromycin (BIAXIN) 500 mg tablet; Take 1 tablet (500 mg total) by mouth every 12 (twelve) hours for 10 days        Reviewed with patient plan to treat with above plan  Patient instructed to call in 72 hours if not feeling better or if symptoms worsen  Answers for HPI/ROS submitted by the patient on 11/7/2022  Chronicity: new  Onset: in the past 7 days  Progression since onset: waxing and waning  Frequency: hourly  Cough characteristics: non-productive  chest pain: No  chills: No  ear congestion: Yes  ear pain: No  fever: No  headaches: Yes  heartburn: No  hemoptysis: No  myalgias: No  nasal congestion: Yes  postnasal drip: Yes  rash: No  rhinorrhea: No  shortness of breath: No  sore throat: No  sweats: No  weight loss:  No  wheezing: Yes  Aggravated by: nothing

## 2022-11-28 DIAGNOSIS — R19.7 DIARRHEA, UNSPECIFIED TYPE: Primary | ICD-10-CM

## 2022-11-28 RX ORDER — DIPHENOXYLATE HYDROCHLORIDE AND ATROPINE SULFATE 2.5; .025 MG/1; MG/1
1 TABLET ORAL 4 TIMES DAILY PRN
Qty: 30 TABLET | Refills: 0 | Status: SHIPPED | OUTPATIENT
Start: 2022-11-28

## 2022-12-01 ENCOUNTER — OFFICE VISIT (OUTPATIENT)
Dept: FAMILY MEDICINE CLINIC | Facility: CLINIC | Age: 67
End: 2022-12-01

## 2022-12-01 ENCOUNTER — TELEPHONE (OUTPATIENT)
Dept: FAMILY MEDICINE CLINIC | Facility: CLINIC | Age: 67
End: 2022-12-01

## 2022-12-01 VITALS
SYSTOLIC BLOOD PRESSURE: 132 MMHG | DIASTOLIC BLOOD PRESSURE: 80 MMHG | BODY MASS INDEX: 33.57 KG/M2 | WEIGHT: 171 LBS | OXYGEN SATURATION: 99 % | HEIGHT: 60 IN | TEMPERATURE: 97.9 F | HEART RATE: 67 BPM | RESPIRATION RATE: 14 BRPM

## 2022-12-01 DIAGNOSIS — R42 DIZZINESS: ICD-10-CM

## 2022-12-01 DIAGNOSIS — R05.8 DRY COUGH: ICD-10-CM

## 2022-12-01 DIAGNOSIS — J98.8 VIRAL RESPIRATORY ILLNESS: Primary | ICD-10-CM

## 2022-12-01 DIAGNOSIS — B97.89 VIRAL RESPIRATORY ILLNESS: Primary | ICD-10-CM

## 2022-12-01 DIAGNOSIS — R09.81 SINUS CONGESTION: ICD-10-CM

## 2022-12-01 LAB
SARS-COV-2 AG UPPER RESP QL IA: NEGATIVE
VALID CONTROL: NORMAL

## 2022-12-01 RX ORDER — MECLIZINE HYDROCHLORIDE 25 MG/1
25 TABLET ORAL 3 TIMES DAILY PRN
Qty: 30 TABLET | Refills: 0 | Status: SHIPPED | OUTPATIENT
Start: 2022-12-01

## 2022-12-01 NOTE — TELEPHONE ENCOUNTER
Patient's  states that Linda Kelly is not feeling any better since you saw her on 11/10    Symptoms are coughing, sore throat, nasal congestion, HA and BA    He states she still has diarrhea but it not as bad as it was     Would you like us to schedule an appointment for her or do you want to send meds into pharmacy?      Please advise

## 2022-12-01 NOTE — PROGRESS NOTES
Name: Sam Jeffrey      :       MRN: [id number]  Encounter Provider: DOUG Cota  Encounter Date: 2022   Encounter department: [address] [place] [address]     1  Viral respiratory illness    2  Dry cough  -     POCT Rapid Covid Ag    3  Dizziness  -     meclizine (ANTIVERT) 25 mg tablet; Take 1 tablet (25 mg total) by mouth 3 (three) times a day as needed for dizziness    4  Sinus congestion  -     POCT Rapid Covid Ag     Discussed viral illness and continuation of supportive care measures  Patient declined prescription for cough medicine  Discussed use of Mucinex DM for cough and thinning of secretions  Patient instructed to call office if symptoms worsen or fever develops  Subjective      Patient is a 78year old female who presents today with a complaint of laryngitis and cough  Patient reports mild sinus pressure, postnasal drip, cough, one episode of right ear pain, and chills that began one day ago  Reports she was sent home from work today  The cough kept her up at night  She is not coughing much today but her voice has become increasingly raspy over the course of the day  She tried the benzonatate capsules but they did not help her cough  She reports she had pneumonia in the middle of November and a stomach bug with diarrhea around ving  Denies fever, generalized muscle aches, chest pain, N/V/C/D, sore throat, or rash  Review of Systems   Constitutional: Positive for chills  Negative for fatigue and fever  HENT: Positive for congestion, ear pain (mild, resolved), postnasal drip, sinus pressure, sinus pain and voice change  Negative for hearing loss, rhinorrhea, sneezing, sore throat and trouble swallowing  Eyes: Negative  Negative for photophobia, pain and redness  Respiratory: Positive for cough and shortness of breath (with increased activity)  Negative for wheezing  Cardiovascular: Negative    Negative for chest pain, palpitations and leg swelling  Gastrointestinal: Negative  Negative for abdominal pain, constipation, diarrhea, nausea and vomiting  Endocrine: Negative  Genitourinary: Negative  Musculoskeletal: Negative  Negative for arthralgias and myalgias  Skin: Negative  Negative for rash  Allergic/Immunologic: Negative  Neurological: Positive for headaches  Negative for light-headedness  Hematological: Negative  Psychiatric/Behavioral: Negative  Current Outpatient Medications on File Prior to Visit   Medication Sig   • amLODIPine (NORVASC) 5 mg tablet TAKE 1 AND 1/2 TABLET BY MOUTH DAILY (Patient taking differently: Take 5 mg by mouth daily at bedtime TAKE 1 TABLET BY MOUTH DAILY)   • Ascorbic Acid (VITAMIN C) 500 MG CAPS Take by mouth   • benzonatate (TESSALON) 200 MG capsule Take 1 capsule (200 mg total) by mouth 3 (three) times a day as needed for cough   • Cholecalciferol (CVS D3) 125 MCG (5000 UT) capsule TAKE 1 CAPSULE BY MOUTH EVERY DAY   • diphenoxylate-atropine (LOMOTIL) 2 5-0 025 mg per tablet Take 1 tablet by mouth 4 (four) times a day as needed for diarrhea   • famotidine (PEPCID) 40 MG tablet TAKE 1 TABLET BY MOUTH EVERY DAY   • Fluticasone-Salmeterol (Advair) 250-50 mcg/dose inhaler Inhale 1 puff 2 (two) times a day Rinse mouth after use   (Patient taking differently: Inhale 1 puff if needed Rinse mouth after use )   • gabapentin (NEURONTIN) 300 mg capsule TAKE 1 CAPSULE BY MOUTH THREE TIMES A DAY   • lisinopril (ZESTRIL) 20 mg tablet TAKE 1 TABLET BY MOUTH EVERY DAY   • potassium chloride (MICRO-K) 10 MEQ CR capsule TAKE 2 CAPSULES (20 MEQ TOTAL) BY MOUTH DAILY   • Synthroid 112 MCG tablet TAKE 1 TABLET (112 MCG TOTAL) BY MOUTH DAILY IN THE EARLY MORNING   • triamterene-hydrochlorothiazide (DYAZIDE) 37 5-25 mg per capsule Take 1 capsule by mouth daily as needed (swelling)   • [DISCONTINUED] meclizine (ANTIVERT) 25 mg tablet Take 1 tablet (25 mg total) by mouth 3 (three) times a day as needed for dizziness   • [DISCONTINUED] methylPREDNISolone 4 MG tablet therapy pack Use as directed on package       Objective     /80   Pulse 67   Temp 97 9 °F (36 6 °C)   Resp 14   Ht 5' (1 524 m)   Wt 77 6 kg (171 lb)   SpO2 99%   BMI 33 40 kg/m²  (Reviewed)    Physical Exam  Vitals and nursing note reviewed  Constitutional:       General: She is not in acute distress  Appearance: Normal appearance  She is normal weight  She is not ill-appearing  HENT:      Head: Normocephalic and atraumatic  Right Ear: Tympanic membrane, ear canal and external ear normal       Left Ear: Tympanic membrane, ear canal and external ear normal       Nose: Nose normal  No congestion or rhinorrhea  Mouth/Throat:      Lips: Pink  Mouth: Mucous membranes are moist       Pharynx: Oropharynx is clear  No oropharyngeal exudate or posterior oropharyngeal erythema  Tonsils: No tonsillar exudate  Comments: Laryngitis noted  Eyes:      General:         Right eye: No discharge  Left eye: No discharge  Extraocular Movements: Extraocular movements intact  Conjunctiva/sclera: Conjunctivae normal       Pupils: Pupils are equal, round, and reactive to light  Cardiovascular:      Rate and Rhythm: Normal rate and regular rhythm  Heart sounds: Normal heart sounds  No murmur heard  No gallop  Pulmonary:      Effort: Pulmonary effort is normal  No respiratory distress  Breath sounds: No wheezing or rhonchi  Abdominal:      General: Abdomen is flat  Bowel sounds are normal       Palpations: Abdomen is soft  Musculoskeletal:         General: Normal range of motion  Cervical back: Normal range of motion and neck supple  Lymphadenopathy:      Cervical: No cervical adenopathy  Skin:     General: Skin is warm  Capillary Refill: Capillary refill takes less than 2 seconds  Findings: No erythema or rash     Neurological:      General: No focal deficit present  Mental Status: She is alert and oriented to person, place, and time  Mental status is at baseline  Motor: No weakness  Psychiatric:         Mood and Affect: Mood normal          Behavior: Behavior normal          Thought Content:  Thought content normal          Judgment: Judgment normal        DOUG Powers

## 2022-12-02 DIAGNOSIS — J01.90 ACUTE SINUSITIS, RECURRENCE NOT SPECIFIED, UNSPECIFIED LOCATION: Primary | ICD-10-CM

## 2022-12-02 RX ORDER — CEPHALEXIN 500 MG/1
500 CAPSULE ORAL EVERY 8 HOURS SCHEDULED
Qty: 30 CAPSULE | Refills: 0 | Status: SHIPPED | OUTPATIENT
Start: 2022-12-02 | End: 2022-12-12

## 2022-12-02 RX ORDER — PREDNISONE 10 MG/1
TABLET ORAL
Qty: 26 TABLET | Refills: 0 | Status: SHIPPED | OUTPATIENT
Start: 2022-12-02

## 2022-12-08 DIAGNOSIS — R60.9 PERIPHERAL EDEMA: ICD-10-CM

## 2022-12-08 RX ORDER — TRIAMTERENE AND HYDROCHLOROTHIAZIDE 37.5; 25 MG/1; MG/1
1 CAPSULE ORAL DAILY PRN
Qty: 90 CAPSULE | Refills: 2 | Status: SHIPPED | OUTPATIENT
Start: 2022-12-08 | End: 2023-03-08

## 2023-01-03 ENCOUNTER — TELEPHONE (OUTPATIENT)
Dept: FAMILY MEDICINE CLINIC | Facility: CLINIC | Age: 68
End: 2023-01-03

## 2023-01-03 DIAGNOSIS — B97.89 VIRAL RESPIRATORY ILLNESS: Primary | ICD-10-CM

## 2023-01-03 DIAGNOSIS — J98.8 VIRAL RESPIRATORY ILLNESS: Primary | ICD-10-CM

## 2023-01-03 RX ORDER — DEXTROMETHORPHAN HYDROBROMIDE AND PROMETHAZINE HYDROCHLORIDE 15; 6.25 MG/5ML; MG/5ML
5 SOLUTION ORAL 4 TIMES DAILY PRN
Qty: 180 ML | Refills: 0 | Status: SHIPPED | OUTPATIENT
Start: 2023-01-03

## 2023-01-03 RX ORDER — AZITHROMYCIN 250 MG/1
TABLET, FILM COATED ORAL
Qty: 6 TABLET | Refills: 0 | Status: SHIPPED | OUTPATIENT
Start: 2023-01-03 | End: 2023-01-07

## 2023-01-03 NOTE — TELEPHONE ENCOUNTER
Patient's  called asking for appointment today for Wicho Neely  Symptoms as of 12/31/22:  Productive cough, sometimes dry, nasal congestion, sore throat, fatigue       COVID NEG  12/31/22    She states that she would either like an appointment today, or medication sent to her pharmacy     [address] [place] [address]

## 2023-03-10 ENCOUNTER — TELEMEDICINE (OUTPATIENT)
Dept: FAMILY MEDICINE CLINIC | Facility: CLINIC | Age: 68
End: 2023-03-10

## 2023-03-10 DIAGNOSIS — I10 ESSENTIAL HYPERTENSION: ICD-10-CM

## 2023-03-10 DIAGNOSIS — H69.83 EUSTACHIAN TUBE DYSFUNCTION, BILATERAL: Primary | ICD-10-CM

## 2023-03-10 RX ORDER — AMLODIPINE BESYLATE 5 MG/1
5 TABLET ORAL DAILY
Qty: 90 TABLET | Refills: 3 | Status: SHIPPED | OUTPATIENT
Start: 2023-03-10

## 2023-03-10 RX ORDER — CEFUROXIME AXETIL 500 MG/1
500 TABLET ORAL EVERY 12 HOURS SCHEDULED
Qty: 20 TABLET | Refills: 0 | Status: SHIPPED | OUTPATIENT
Start: 2023-03-10 | End: 2023-03-20

## 2023-03-10 RX ORDER — PREDNISONE 10 MG/1
TABLET ORAL
Qty: 26 TABLET | Refills: 0 | Status: SHIPPED | OUTPATIENT
Start: 2023-03-10

## 2023-03-13 NOTE — PROGRESS NOTES
Virtual Regular Visit    Verification of patient location:    Patient is located in the following state in which I hold an active license [place]      Assessment/Plan:    Problem List Items Addressed This Visit    None  Visit Diagnoses     Eustachian tube dysfunction, bilateral    -  Primary    Relevant Medications    predniSONE 10 mg tablet    cefuroxime (CEFTIN) 500 mg tablet               Reason for visit is No chief complaint on file  Encounter provider Darrick Masterson DO    Provider located at [address] [id number]      Recent Visits  Date Type Provider Dept   03/10/23 1906 [address] recent visits within past 7 days and meeting all other requirements  Future Appointments  No visits were found meeting these conditions  Showing future appointments within next 150 days and meeting all other requirements       The patient was identified by name and date of birth  Huong Jin was informed that this is a telemedicine visit and that the visit is being conducted through the [facility]  She agrees to proceed     My office door was closed  No one else was in the room  She acknowledged consent and understanding of privacy and security of the video platform  The patient has agreed to participate and understands they can discontinue the visit at any time  Patient is aware this is a billable service  Jenn Garner is a 78 y o  female complains of ear pressure, crackling and fullness in ears with some very slight diziness with positional changes  Amy SANTIAGO     Past Medical History:   Diagnosis Date   • Disease of thyroid gland    • Hypertension        Past Surgical History:   Procedure Laterality Date   •  SECTION     • NV LAP,CHOLECYSTECTOMY N/A 11/15/2017    Procedure: CHOLECYSTECTOMY LAPAROSCOPIC, umbilical hernia repair;  Surgeon: Susan Lagos MD;  Location: [facility];  Service: General   • TONSILLECTOMY         Current Outpatient Medications   Medication Sig Dispense Refill   • cefuroxime (CEFTIN) 500 mg tablet Take 1 tablet (500 mg total) by mouth every 12 (twelve) hours for 10 days 20 tablet 0   • predniSONE 10 mg tablet 3 tabs po bid x2 days, then 2 tabs po bid x2 days, then 1 tab bid x2 days, then 1 daily until done  26 tablet 0   • amLODIPine (NORVASC) 5 mg tablet Take 1 tablet (5 mg total) by mouth daily TAKE 1 TABLET BY MOUTH DAILY 90 tablet 3   • Ascorbic Acid (VITAMIN C) 500 MG CAPS Take by mouth     • benzonatate (TESSALON) 200 MG capsule Take 1 capsule (200 mg total) by mouth 3 (three) times a day as needed for cough 30 capsule 0   • Cholecalciferol ([employer] D3) 125 MCG (5000 UT) capsule TAKE 1 CAPSULE BY MOUTH EVERY DAY 30 capsule 3   • diphenoxylate-atropine (LOMOTIL) 2 5-0 025 mg per tablet Take 1 tablet by mouth 4 (four) times a day as needed for diarrhea 30 tablet 0   • famotidine (PEPCID) 40 MG tablet TAKE 1 TABLET BY MOUTH EVERY DAY 90 tablet 3   • Fluticasone-Salmeterol (Advair) 250-50 mcg/dose inhaler Inhale 1 puff 2 (two) times a day Rinse mouth after use  (Patient taking differently: Inhale 1 puff if needed Rinse mouth after use ) 60 blister 3   • gabapentin (NEURONTIN) 300 mg capsule TAKE 1 CAPSULE BY MOUTH THREE TIMES A DAY 90 capsule 3   • lisinopril (ZESTRIL) 20 mg tablet TAKE 1 TABLET BY MOUTH EVERY DAY 90 tablet 1   • meclizine (ANTIVERT) 25 mg tablet Take 1 tablet (25 mg total) by mouth 3 (three) times a day as needed for dizziness 30 tablet 0   • potassium chloride (MICRO-K) 10 MEQ CR capsule TAKE 2 CAPSULES (20 MEQ TOTAL) BY MOUTH DAILY 180 capsule 3   • predniSONE 10 mg tablet 3 tabs po bid x2 days, then 2 tabs po bid x2 days, then 1 tab bid x2 days, then 1 daily until done   26 tablet 0   • Promethazine-DM (PHENERGAN-DM) 6 25-15 mg/5 mL oral syrup Take 5 mL by mouth 4 (four) times a day as needed for cough 180 mL 0   • Synthroid 112 MCG tablet TAKE 1 TABLET (112 MCG TOTAL) BY MOUTH DAILY IN THE EARLY MORNING 90 tablet 1   • triamterene-hydrochlorothiazide (DYAZIDE) 37 5-25 mg per capsule TAKE 1 CAPSULE BY MOUTH DAILY AS NEEDED (SWELLING) 90 capsule 2     No current facility-administered medications for this visit  Allergies   Allergen Reactions   • Morphine GI Intolerance     Reaction Date: 29Aug2011;    • Escitalopram Rash     Reaction Date: 29Aug2011;        Review of Systems   Constitutional: Negative for chills and fever  HENT: Positive for ear pain and postnasal drip  Negative for congestion, sinus pressure and sinus pain  + ear pressure and crackling   Respiratory: Negative for cough  Neurological: Positive for dizziness  Negative for headaches  All other systems reviewed and are negative  Video Exam    There were no vitals filed for this visit  Physical Exam  Constitutional:       Appearance: Normal appearance  Eyes:      General:         Right eye: No discharge  Left eye: No discharge  Pulmonary:      Effort: No respiratory distress  Skin:     Findings: No rash  Neurological:      Mental Status: She is alert  Psychiatric:         Mood and Affect: Mood normal          Behavior: Behavior normal          Thought Content:  Thought content normal          Judgment: Judgment normal           I spent 15 minutes directly with the patient during this visit

## 2023-04-01 DIAGNOSIS — M54.50 BACK PAIN, LUMBOSACRAL: ICD-10-CM

## 2023-04-03 RX ORDER — GABAPENTIN 300 MG/1
CAPSULE ORAL
Qty: 90 CAPSULE | Refills: 3 | Status: SHIPPED | OUTPATIENT
Start: 2023-04-03

## 2023-04-22 ENCOUNTER — APPOINTMENT (OUTPATIENT)
Dept: LAB | Facility: CLINIC | Age: 68
End: 2023-04-22

## 2023-04-22 DIAGNOSIS — E78.00 HYPERCHOLESTEROLEMIA: ICD-10-CM

## 2023-04-22 DIAGNOSIS — E03.9 HYPOTHYROIDISM, UNSPECIFIED TYPE: ICD-10-CM

## 2023-04-22 DIAGNOSIS — I10 ESSENTIAL HYPERTENSION: ICD-10-CM

## 2023-04-22 DIAGNOSIS — E55.9 VITAMIN D DEFICIENCY: ICD-10-CM

## 2023-04-22 LAB
25(OH)D3 SERPL-MCNC: 62.3 NG/ML (ref 30–100)
ALBUMIN SERPL BCP-MCNC: 3.9 G/DL (ref 3.5–5)
ALP SERPL-CCNC: 93 U/L (ref 34–104)
ALT SERPL W P-5'-P-CCNC: 13 U/L (ref 7–52)
ANION GAP SERPL CALCULATED.3IONS-SCNC: 7 MMOL/L (ref 4–13)
AST SERPL W P-5'-P-CCNC: 17 U/L (ref 13–39)
BILIRUB SERPL-MCNC: 0.39 MG/DL (ref 0.2–1)
BUN SERPL-MCNC: 13 MG/DL (ref 5–25)
CALCIUM SERPL-MCNC: 9.1 MG/DL (ref 8.4–10.2)
CHLORIDE SERPL-SCNC: 105 MMOL/L (ref 96–108)
CHOLEST SERPL-MCNC: 200 MG/DL
CO2 SERPL-SCNC: 30 MMOL/L (ref 21–32)
CREAT SERPL-MCNC: 0.71 MG/DL (ref 0.6–1.3)
GFR SERPL CREATININE-BSD FRML MDRD: 89 ML/MIN/1.73SQ M
GLUCOSE P FAST SERPL-MCNC: 100 MG/DL (ref 65–99)
HDLC SERPL-MCNC: 50 MG/DL
LDLC SERPL CALC-MCNC: 130 MG/DL (ref 0–100)
NONHDLC SERPL-MCNC: 150 MG/DL
POTASSIUM SERPL-SCNC: 3.5 MMOL/L (ref 3.5–5.3)
PROT SERPL-MCNC: 7.1 G/DL (ref 6.4–8.4)
SODIUM SERPL-SCNC: 142 MMOL/L (ref 135–147)
TRIGL SERPL-MCNC: 101 MG/DL
TSH SERPL DL<=0.05 MIU/L-ACNC: 0.84 UIU/ML (ref 0.45–4.5)

## 2023-04-25 ENCOUNTER — OFFICE VISIT (OUTPATIENT)
Dept: FAMILY MEDICINE CLINIC | Facility: CLINIC | Age: 68
End: 2023-04-25

## 2023-04-25 VITALS
TEMPERATURE: 97.8 F | BODY MASS INDEX: 33.57 KG/M2 | WEIGHT: 171 LBS | SYSTOLIC BLOOD PRESSURE: 142 MMHG | HEIGHT: 60 IN | OXYGEN SATURATION: 96 % | HEART RATE: 82 BPM | DIASTOLIC BLOOD PRESSURE: 94 MMHG

## 2023-04-25 DIAGNOSIS — E03.9 ACQUIRED HYPOTHYROIDISM: ICD-10-CM

## 2023-04-25 DIAGNOSIS — Z00.00 MEDICARE ANNUAL WELLNESS VISIT, SUBSEQUENT: Primary | ICD-10-CM

## 2023-04-25 DIAGNOSIS — R42 DIZZINESS: ICD-10-CM

## 2023-04-25 DIAGNOSIS — R60.9 EDEMA, UNSPECIFIED TYPE: ICD-10-CM

## 2023-04-25 DIAGNOSIS — I10 PRIMARY HYPERTENSION: ICD-10-CM

## 2023-04-25 RX ORDER — MECLIZINE HYDROCHLORIDE 25 MG/1
25 TABLET ORAL 3 TIMES DAILY PRN
Qty: 30 TABLET | Refills: 3 | Status: SHIPPED | OUTPATIENT
Start: 2023-04-25

## 2023-04-25 RX ORDER — SPIRONOLACTONE 25 MG/1
25 TABLET ORAL DAILY
Qty: 30 TABLET | Refills: 5 | Status: SHIPPED | OUTPATIENT
Start: 2023-04-25

## 2023-04-25 NOTE — PROGRESS NOTES
Assessment and Plan:     Problem List Items Addressed This Visit    None       Preventive health issues were discussed with patient, and age appropriate screening tests were ordered as noted in patient's After Visit Summary  Personalized health advice and appropriate referrals for health education or preventive services given if needed, as noted in patient's After Visit Summary  History of Present Illness:     Patient presents for a Medicare Wellness Visit  Her blood pressure and hypothyroidism is well controlled  She has been experiencing some mild edema, but recently it does not totally resolve overnight  HPI   Patient Care Team:  Luis Carlos Michel DO as PCP - General     Review of Systems:     Review of Systems   Constitutional: Negative for appetite change, chills and fever  HENT: Negative for ear pain, facial swelling, rhinorrhea, sinus pain, sore throat and trouble swallowing  Eyes: Negative for discharge and redness  Respiratory: Negative for chest tightness, shortness of breath and wheezing  Cardiovascular: Positive for leg swelling  Negative for chest pain and palpitations  Gastrointestinal: Negative for abdominal pain, diarrhea, nausea and vomiting  Endocrine: Negative for polyuria  Genitourinary: Negative for dysuria and urgency  Musculoskeletal: Negative for arthralgias and back pain  Skin: Negative for rash  Neurological: Negative for dizziness, weakness and headaches  Hematological: Negative for adenopathy  Psychiatric/Behavioral: Negative for behavioral problems, confusion and sleep disturbance  All other systems reviewed and are negative         Problem List:     Patient Active Problem List   Diagnosis   • Anxiety   • Hypertension   • Hypothyroidism   • Insomnia   • Obesity   • Vitamin D deficiency      Past Medical and Surgical History:     Past Medical History:   Diagnosis Date   • Disease of thyroid gland    • Hypertension      Past Surgical History: Procedure Laterality Date   •  SECTION     • [place] LAPAROSCOPY SURG CHOLECYSTECTOMY N/A 11/15/2017    Procedure: CHOLECYSTECTOMY LAPAROSCOPIC, umbilical hernia repair;  Surgeon: Felipe Anderson MD;  Location: [facility];  Service: General   • TONSILLECTOMY        Family History:     Family History   Problem Relation Age of Onset   • No Known Problems Mother    • Diabetes Father         Mellitus   • Heart disease Father    • Hypertension Father       Social History:     Social History     Socioeconomic History   • Marital status: /Civil Union     Spouse name: Not on file   • Number of children: Not on file   • Years of education: Not on file   • Highest education level: Not on file   Occupational History   • Not on file   Tobacco Use   • Smoking status: Never   • Smokeless tobacco: Former   Substance and Sexual Activity   • Alcohol use: No   • Drug use: No   • Sexual activity: Not on file   Other Topics Concern   • Not on file   Social History Narrative    Denied: History of daily coffee consumption    Daily cola consumption     Social Determinants of Health     Financial Resource Strain: Low Risk    • Difficulty of Paying Living Expenses: Not hard at all   Food Insecurity: Not on file   Transportation Needs: No Transportation Needs   • Lack of Transportation (Medical): No   • Lack of Transportation (Non-Medical):  No   Physical Activity: Not on file   Stress: Not on file   Social Connections: Not on file   Intimate Partner Violence: Not on file   Housing Stability: Not on file      Medications and Allergies:     Current Outpatient Medications   Medication Sig Dispense Refill   • amLODIPine (NORVASC) 5 mg tablet Take 1 tablet (5 mg total) by mouth daily TAKE 1 TABLET BY MOUTH DAILY 90 tablet 3   • Ascorbic Acid (VITAMIN C) 500 MG CAPS Take by mouth     • benzonatate (TESSALON) 200 MG capsule Take 1 capsule (200 mg total) by mouth 3 (three) times a day as needed for cough 30 capsule 0   • Cholecalciferol (CVS D3) 125 MCG (5000 UT) capsule TAKE 1 CAPSULE BY MOUTH EVERY DAY 30 capsule 3   • diphenoxylate-atropine (LOMOTIL) 2 5-0 025 mg per tablet Take 1 tablet by mouth 4 (four) times a day as needed for diarrhea 30 tablet 0   • famotidine (PEPCID) 40 MG tablet TAKE 1 TABLET BY MOUTH EVERY DAY 90 tablet 3   • Fluticasone-Salmeterol (Advair) 250-50 mcg/dose inhaler Inhale 1 puff 2 (two) times a day Rinse mouth after use  (Patient taking differently: Inhale 1 puff if needed Rinse mouth after use ) 60 blister 3   • gabapentin (NEURONTIN) 300 mg capsule TAKE 1 CAPSULE BY MOUTH THREE TIMES A DAY 90 capsule 3   • lisinopril (ZESTRIL) 20 mg tablet TAKE 1 TABLET BY MOUTH EVERY DAY 90 tablet 1   • meclizine (ANTIVERT) 25 mg tablet Take 1 tablet (25 mg total) by mouth 3 (three) times a day as needed for dizziness 30 tablet 0   • potassium chloride (MICRO-K) 10 MEQ CR capsule TAKE 2 CAPSULES (20 MEQ TOTAL) BY MOUTH DAILY 180 capsule 3   • predniSONE 10 mg tablet 3 tabs po bid x2 days, then 2 tabs po bid x2 days, then 1 tab bid x2 days, then 1 daily until done  26 tablet 0   • predniSONE 10 mg tablet 3 tabs po bid x2 days, then 2 tabs po bid x2 days, then 1 tab bid x2 days, then 1 daily until done  26 tablet 0   • Promethazine-DM (PHENERGAN-DM) 6 25-15 mg/5 mL oral syrup Take 5 mL by mouth 4 (four) times a day as needed for cough 180 mL 0   • Synthroid 112 MCG tablet TAKE 1 TABLET (112 MCG TOTAL) BY MOUTH DAILY IN THE EARLY MORNING 90 tablet 1   • triamterene-hydrochlorothiazide (DYAZIDE) 37 5-25 mg per capsule TAKE 1 CAPSULE BY MOUTH DAILY AS NEEDED (SWELLING) 90 capsule 2     No current facility-administered medications for this visit       Allergies   Allergen Reactions   • Morphine GI Intolerance     Reaction Date: 29Aug2011;    • Escitalopram Rash     Reaction Date: 29Aug2011;       Immunizations:     Immunization History   Administered Date(s) Administered   • COVID-19 J&J Pixie Conradi) vaccine 0 5 mL 04/08/2021   • Influenza, high dose seasonal 0 7 mL 10/22/2019, 10/10/2022   • Influenza, recombinant, quadrivalent,injectable, preservative free 10/12/2020   • Pneumococcal Conjugate Vaccine 20-valent (Pcv20), Polysace 04/18/2022   • Pneumococcal Polysaccharide PPV23 08/30/2021   • Tdap 04/13/2017      Health Maintenance:         Topic Date Due   • Hepatitis C Screening  Never done   • DXA SCAN  Never done   • Breast Cancer Screening: Mammogram  Never done   • Colorectal Cancer Screening  03/01/2024         Topic Date Due   • COVID-19 Vaccine (2 - Booster for Demetrio series) 06/03/2021      Medicare Screening Tests and Risk Assessments:     Milka Rowland is here for her Subsequent Wellness visit  Last Medicare Wellness visit information reviewed, patient interviewed, no change since last AWV  Health Risk Assessment:   Patient rates overall health as good  Patient feels that their physical health rating is same  Patient is satisfied with their life  Eyesight was rated as same  Hearing was rated as same  Patient feels that their emotional and mental health rating is same  Patients states they are never, rarely angry  Patient states they are often unusually tired/fatigued  Pain experienced in the last 7 days has been some  Patient's pain rating has been 2/10  Patient states that she has experienced weight loss or gain in last 6 months  Fall Risk Screening: In the past year, patient has experienced: no history of falling in past year      Urinary Incontinence Screening:   Patient has not leaked urine accidently in the last six months  Home Safety:  Patient has trouble with stairs inside or outside of their home  Patient has working smoke alarms and has no working carbon monoxide detector  Home safety hazards include: none  Nutrition:   Current diet is Regular  Medications:   Patient is currently taking over-the-counter supplements  OTC medications include: Vit C  Patient is able to manage medications       Activities of Daily Living (ADLs)/Instrumental Activities of Daily Living (IADLs):   Walk and transfer into and out of bed and chair?: Yes  Dress and groom yourself?: Yes    Bathe or shower yourself?: Yes    Feed yourself? Yes  Do your laundry/housekeeping?: Yes  Manage your money, pay your bills and track your expenses?: Yes  Make your own meals?: Yes    Do your own shopping?: Yes    Previous Hospitalizations:   Any hospitalizations or ED visits within the last 12 months?: No      Advance Care Planning:   Living will: No    Durable POA for healthcare: No    Advanced directive: No    Advanced directive counseling given: Yes    Five wishes given: Yes    Patient declined ACP directive: No    End of Life Decisions reviewed with patient: Yes    Provider agrees with end of life decisions: Yes      Cognitive Screening:   Provider or family/friend/caregiver concerned regarding cognition?: No    PREVENTIVE SCREENINGS      Cardiovascular Screening:    General: Screening Not Indicated and History Lipid Disorder      Diabetes Screening:     General: Screening Current      Colorectal Cancer Screening:     General: Screening Current      Cervical Cancer Screening:    General: Screening Not Indicated      Osteoporosis Screening:    General: Screening Current      Abdominal Aortic Aneurysm (AAA) Screening:        General: Screening Current and Screening Not Indicated      Lung Cancer Screening:     General: Screening Not Indicated      Hepatitis C Screening:    General: Screening Current    Screening, Brief Intervention, and Referral to Treatment (SBIRT)    Screening  Typical number of drinks in a day: 0  Typical number of drinks in a week: 0  Interpretation: Low risk drinking behavior      AUDIT-C Screenin) How often did you have a drink containing alcohol in the past year? never  2) How many drinks did you have on a typical day when you were drinking in the past year? 0  3) How often did you have 6 or more drinks on one occasion in the past year? never    AUDIT-C Score: 0  Interpretation: Score 0-2 (female): Negative screen for alcohol misuse    Single Item Drug Screening:  How often have you used an illegal drug (including marijuana) or a prescription medication for non-medical reasons in the past year? never    Single Item Drug Screen Score: 0  Interpretation: Negative screen for possible drug use disorder    No results found  Physical Exam:     There were no vitals taken for this visit  Physical Exam  Vitals and nursing note reviewed  Constitutional:       General: She is not in acute distress  Appearance: Normal appearance  She is not ill-appearing or diaphoretic  HENT:      Head: Normocephalic and atraumatic  Right Ear: Tympanic membrane, ear canal and external ear normal       Left Ear: Tympanic membrane, ear canal and external ear normal       Nose: Nose normal  No congestion or rhinorrhea  Mouth/Throat:      Mouth: Mucous membranes are moist       Pharynx: Oropharynx is clear  No posterior oropharyngeal erythema  Eyes:      General:         Right eye: No discharge  Left eye: No discharge  Extraocular Movements: Extraocular movements intact  Conjunctiva/sclera: Conjunctivae normal       Pupils: Pupils are equal, round, and reactive to light  Neck:      Vascular: No carotid bruit  Cardiovascular:      Rate and Rhythm: Normal rate and regular rhythm  Pulses: Normal pulses  Heart sounds: Normal heart sounds  No murmur heard  Pulmonary:      Effort: Pulmonary effort is normal  No respiratory distress  Breath sounds: Normal breath sounds  No wheezing or rhonchi  Abdominal:      General: Abdomen is flat  Bowel sounds are normal  There is no distension  Palpations: There is no mass  Tenderness: There is no abdominal tenderness  Musculoskeletal:         General: No swelling or deformity  Normal range of motion  Cervical back: Normal range of motion and neck supple   No rigidity  Right lower leg: Edema present  Left lower leg: Edema present  Comments: + 1 pitting edema   Lymphadenopathy:      Cervical: No cervical adenopathy  Skin:     General: Skin is warm and dry  Capillary Refill: Capillary refill takes less than 2 seconds  Coloration: Skin is not jaundiced  Findings: No bruising, erythema or rash  Neurological:      General: No focal deficit present  Mental Status: She is alert and oriented to person, place, and time  Cranial Nerves: No cranial nerve deficit  Sensory: No sensory deficit  Gait: Gait normal       Deep Tendon Reflexes: Reflexes normal    Psychiatric:         Mood and Affect: Mood normal          Behavior: Behavior normal          Thought Content:  Thought content normal          Judgment: Judgment normal           Hattie Juarez DO

## 2023-04-25 NOTE — PATIENT INSTRUCTIONS
Medicare Preventive Visit Patient Instructions  Thank you for completing your Welcome to Medicare Visit or Medicare Annual Wellness Visit today  Your next wellness visit will be due in one year (4/25/2024)  The screening/preventive services that you may require over the next 5-10 years are detailed below  Some tests may not apply to you based off risk factors and/or age  Screening tests ordered at today's visit but not completed yet may show as past due  Also, please note that scanned in results may not display below  Preventive Screenings:  Service Recommendations Previous Testing/Comments   Colorectal Cancer Screening  * Colonoscopy    * Fecal Occult Blood Test (FOBT)/Fecal Immunochemical Test (FIT)  * Fecal DNA/Cologuard Test  * Flexible Sigmoidoscopy Age: 39-70 years old   Colonoscopy: every 10 years (may be performed more frequently if at higher risk)  OR  FOBT/FIT: every 1 year  OR  Cologuard: every 3 years  OR  Sigmoidoscopy: every 5 years  Screening may be recommended earlier than age 39 if at higher risk for colorectal cancer  Also, an individualized decision between you and your healthcare provider will decide whether screening between the ages of 74-80 would be appropriate  Colonoscopy: 03/01/2021  FOBT/FIT: Not on file  Cologuard: 03/01/2021  Sigmoidoscopy: Not on file    Screening Current     Breast Cancer Screening Age: 36 years old  Frequency: every 1-2 years  Not required if history of left and right mastectomy Mammogram: Not on file        Cervical Cancer Screening Between the ages of 21-29, pap smear recommended once every 3 years  Between the ages of 33-67, can perform pap smear with HPV co-testing every 5 years     Recommendations may differ for women with a history of total hysterectomy, cervical cancer, or abnormal pap smears in past  Pap Smear: 07/23/2015    Screening Not Indicated   Hepatitis C Screening Once for adults born between 1945 and 1965  More frequently in patients at high risk for Hepatitis C Hep C Antibody: Not on file        Diabetes Screening 1-2 times per year if you're at risk for diabetes or have pre-diabetes Fasting glucose: 100 mg/dL (4/22/2023)  A1C: No results in last 5 years (No results in last 5 years)  Screening Current   Cholesterol Screening Once every 5 years if you don't have a lipid disorder  May order more often based on risk factors  Lipid panel: 04/22/2023    Screening Not Indicated  History Lipid Disorder     Other Preventive Screenings Covered by Medicare:  1  Abdominal Aortic Aneurysm (AAA) Screening: covered once if your at risk  You're considered to be at risk if you have a family history of AAA  2  Lung Cancer Screening: covers low dose CT scan once per year if you meet all of the following conditions: (1) Age 50-69; (2) No signs or symptoms of lung cancer; (3) Current smoker or have quit smoking within the last 15 years; (4) You have a tobacco smoking history of at least 20 pack years (packs per day multiplied by number of years you smoked); (5) You get a written order from a healthcare provider  3  Glaucoma Screening: covered annually if you're considered high risk: (1) You have diabetes OR (2) Family history of glaucoma OR (3)  aged 48 and older OR (3)  American aged 72 and older  3  Osteoporosis Screening: covered every 2 years if you meet one of the following conditions: (1) You're estrogen deficient and at risk for osteoporosis based off medical history and other findings; (2) Have a vertebral abnormality; (3) On glucocorticoid therapy for more than 3 months; (4) Have primary hyperparathyroidism; (5) On osteoporosis medications and need to assess response to drug therapy  · Last bone density test (DXA Scan): Not on file  5  HIV Screening: covered annually if you're between the age of 12-76  Also covered annually if you are younger than 13 and older than 72 with risk factors for HIV infection   For pregnant patients, it is covered up to 3 times per pregnancy  Immunizations:  Immunization Recommendations   Influenza Vaccine Annual influenza vaccination during flu season is recommended for all persons aged >= 6 months who do not have contraindications   Pneumococcal Vaccine   * Pneumococcal conjugate vaccine = PCV13 (Prevnar 13), PCV15 (Vaxneuvance), PCV20 (Prevnar 20)  * Pneumococcal polysaccharide vaccine = PPSV23 (Pneumovax) Adults 25-60 years old: 1-3 doses may be recommended based on certain risk factors  Adults 72 years old: 1-2 doses may be recommended based off what pneumonia vaccine you previously received   Hepatitis B Vaccine 3 dose series if at intermediate or high risk (ex: diabetes, end stage renal disease, liver disease)   Tetanus (Td) Vaccine - COST NOT COVERED BY MEDICARE PART B Following completion of primary series, a booster dose should be given every 10 years to maintain immunity against tetanus  Td may also be given as tetanus wound prophylaxis  Tdap Vaccine - COST NOT COVERED BY MEDICARE PART B Recommended at least once for all adults  For pregnant patients, recommended with each pregnancy  Shingles Vaccine (Shingrix) - COST NOT COVERED BY MEDICARE PART B  2 shot series recommended in those aged 48 and above     Health Maintenance Due:      Topic Date Due   • Hepatitis C Screening  Never done   • DXA SCAN  Never done   • Breast Cancer Screening: Mammogram  Never done   • Colorectal Cancer Screening  03/01/2024     Immunizations Due:      Topic Date Due   • COVID-19 Vaccine (2 - Booster for [place] series) 06/03/2021     Advance Directives   What are advance directives? Advance directives are legal documents that state your wishes and plans for medical care  These plans are made ahead of time in case you lose your ability to make decisions for yourself  Advance directives can apply to any medical decision, such as the treatments you want, and if you want to donate organs     What are the types of advance directives? There are many types of advance directives, and each state has rules about how to use them  You may choose a combination of any of the following:  · Living will: This is a written record of the treatment you want  You can also choose which treatments you do not want, which to limit, and which to stop at a certain time  This includes surgery, medicine, IV fluid, and tube feedings  · Durable power of  for healthcare [facility] [place]): This is a written record that states who you want to make healthcare choices for you when you are unable to make them for yourself  This person, called a proxy, is usually a family member or a friend  You may choose more than 1 proxy  · Do not resuscitate (DNR) order:  A DNR order is used in case your heart stops beating or you stop breathing  It is a request not to have certain forms of treatment, such as CPR  A DNR order may be included in other types of advance directives  · Medical directive: This covers the care that you want if you are in a coma, near death, or unable to make decisions for yourself  You can list the treatments you want for each condition  Treatment may include pain medicine, surgery, blood transfusions, dialysis, IV or tube feedings, and a ventilator (breathing machine)  · Values history: This document has questions about your views, beliefs, and how you feel and think about life  This information can help others choose the care that you would choose  Why are advance directives important? An advance directive helps you control your care  Although spoken wishes may be used, it is better to have your wishes written down  Spoken wishes can be misunderstood, or not followed  Treatments may be given even if you do not want them  An advance directive may make it easier for your family to make difficult choices about your care     Weight Management   Why it is important to manage your weight:  Being overweight increases your risk of health conditions such as heart disease, high blood pressure, type 2 diabetes, and certain types of cancer  It can also increase your risk for osteoarthritis, sleep apnea, and other respiratory problems  Aim for a slow, steady weight loss  Even a small amount of weight loss can lower your risk of health problems  How to lose weight safely:  A safe and healthy way to lose weight is to eat fewer calories and get regular exercise  You can lose up about 1 pound a week by decreasing the number of calories you eat by 500 calories each day  Healthy meal plan for weight management:  A healthy meal plan includes a variety of foods, contains fewer calories, and helps you stay healthy  A healthy meal plan includes the following:  · Eat whole-grain foods more often  A healthy meal plan should contain fiber  Fiber is the part of grains, fruits, and vegetables that is not broken down by your body  Whole-grain foods are healthy and provide extra fiber in your diet  Some examples of whole-grain foods are whole-wheat breads and pastas, oatmeal, brown rice, and bulgur  · Eat a variety of vegetables every day  Include dark, leafy greens such as spinach, kale, jyoti greens, and mustard greens  Eat yellow and orange vegetables such as carrots, sweet potatoes, and winter squash  · Eat a variety of fruits every day  Choose fresh or canned fruit (canned in its own juice or light syrup) instead of juice  Fruit juice has very little or no fiber  · Eat low-fat dairy foods  Drink fat-free (skim) milk or 1% milk  Eat fat-free yogurt and low-fat cottage cheese  Try low-fat cheeses such as mozzarella and other reduced-fat cheeses  · Choose meat and other protein foods that are low in fat  Choose beans or other legumes such as split peas or lentils  Choose fish, skinless poultry (chicken or turkey), or lean cuts of red meat (beef or pork)  Before you cook meat or poultry, cut off any visible fat  · Use less fat and oil    Try baking foods instead of frying them  Add less fat, such as margarine, sour cream, regular salad dressing and mayonnaise to foods  Eat fewer high-fat foods  Some examples of high-fat foods include french fries, doughnuts, ice cream, and cakes  · Eat fewer sweets  Limit foods and drinks that are high in sugar  This includes candy, cookies, regular soda, and sweetened drinks  Exercise:  Exercise at least 30 minutes per day on most days of the week  Some examples of exercise include walking, biking, dancing, and swimming  You can also fit in more physical activity by taking the stairs instead of the elevator or parking farther away from stores  Ask your healthcare provider about the best exercise plan for you  © Copyright [employer] 2018 Information is for End User's use only and may not be sold, redistributed or otherwise used for commercial purposes   All illustrations and images included in CareNotes® are the copyrighted property of A D [employer] M , [employer]  or [address] [place] [address]

## 2023-05-10 ENCOUNTER — OFFICE VISIT (OUTPATIENT)
Dept: FAMILY MEDICINE CLINIC | Facility: CLINIC | Age: 68
End: 2023-05-10

## 2023-05-10 VITALS
OXYGEN SATURATION: 96 % | DIASTOLIC BLOOD PRESSURE: 80 MMHG | SYSTOLIC BLOOD PRESSURE: 128 MMHG | HEIGHT: 60 IN | BODY MASS INDEX: 32.79 KG/M2 | HEART RATE: 79 BPM | TEMPERATURE: 97.7 F | WEIGHT: 167 LBS

## 2023-05-10 DIAGNOSIS — J01.90 ACUTE SINUSITIS, RECURRENCE NOT SPECIFIED, UNSPECIFIED LOCATION: Primary | ICD-10-CM

## 2023-05-10 RX ORDER — CEFUROXIME AXETIL 500 MG/1
500 TABLET ORAL EVERY 12 HOURS SCHEDULED
Qty: 20 TABLET | Refills: 0 | Status: SHIPPED | OUTPATIENT
Start: 2023-05-10 | End: 2023-05-20

## 2023-05-10 RX ORDER — PREDNISONE 10 MG/1
TABLET ORAL
Qty: 26 TABLET | Refills: 0 | Status: SHIPPED | OUTPATIENT
Start: 2023-05-10

## 2023-05-17 NOTE — PROGRESS NOTES
Answers for HPI/ROS submitted by the patient on 5/10/2023  Onset: in the past 7 days  Progression since onset: waxing and waning  Frequency: hourly  Cough characteristics: productive of sputum  chest pain: No  chills: No  ear congestion: No  ear pain: Yes  fever: No  headaches: Yes  heartburn: No  hemoptysis: No  myalgias: No  nasal congestion: Yes  postnasal drip: Yes  rash: No  rhinorrhea: No  shortness of breath: No  sore throat: No  sweats: No  weight loss: No  wheezing: No  Aggravated by: cold air    Patient ID: Good Kelly is a 78 y o  female  HPI: 78 y  o female presenting with symptoms of sinus pain,pressure, nasal congestion, pnd dry cough , ear and throat pain  Symptoms for the past one week        SUBJECTIVE    Family History   Problem Relation Age of Onset   • Dementia Mother    • Thyroid disease Mother    • Arthritis Mother         almost all members on maternal side have severe arthritis   • Cancer Mother         neuroendocrine tumor   • Hearing loss Mother    • Vision loss Mother    • Anxiety disorder Mother    • Diabetes Father         Mellitus   • Heart disease Father    • Hypertension Father      Social History     Socioeconomic History   • Marital status: /Civil Union     Spouse name: Not on file   • Number of children: Not on file   • Years of education: Not on file   • Highest education level: Not on file   Occupational History   • Not on file   Tobacco Use   • Smoking status: Never     Passive exposure: Past   • Smokeless tobacco: Never   Substance and Sexual Activity   • Alcohol use: No   • Drug use: Never   • Sexual activity: Not on file   Other Topics Concern   • Not on file   Social History Narrative    Denied: History of daily coffee consumption    Daily cola consumption     Social Determinants of Health     Financial Resource Strain: Low Risk    • Difficulty of Paying Living Expenses: Not hard at all   Food Insecurity: Not on file   Transportation Needs: No Transportation Needs   • Lack of Transportation (Medical): No   • Lack of Transportation (Non-Medical): No   Physical Activity: Not on file   Stress: Not on file   Social Connections: Not on file   Intimate Partner Violence: Not on file   Housing Stability: Not on file     Past Medical History:   Diagnosis Date   • Allergic     seasonal/sensitive to some medications   • Arthritis     knees   • Disease of thyroid gland    • GERD (gastroesophageal reflux disease) sometimes a few years   • Hypertension    • Obesity    • Otitis media few times a year     Past Surgical History:   Procedure Laterality Date   •  SECTION     • CHOLECYSTECTOMY     • EYE SURGERY  2021    Cataracts   • NH LAPAROSCOPY SURG CHOLECYSTECTOMY N/A 11/15/2017    Procedure: CHOLECYSTECTOMY LAPAROSCOPIC, umbilical hernia repair;  Surgeon: Erlinda Mcknight MD;  Location: [facility];  Service: General   • TONSILLECTOMY     • TUBAL LIGATION       Allergies   Allergen Reactions   • Morphine GI Intolerance     Reaction Date: 2011;    • Escitalopram Rash     Reaction Date: 2011;        Current Outpatient Medications:   •  amLODIPine (NORVASC) 5 mg tablet, Take 1 tablet (5 mg total) by mouth daily TAKE 1 TABLET BY MOUTH DAILY, Disp: 90 tablet, Rfl: 3  •  Ascorbic Acid (VITAMIN C) 500 MG CAPS, Take by mouth, Disp: , Rfl:   •  cefuroxime (CEFTIN) 500 mg tablet, Take 1 tablet (500 mg total) by mouth every 12 (twelve) hours for 10 days, Disp: 20 tablet, Rfl: 0  •  Cholecalciferol ([employer] D3) 125 MCG (5000 UT) capsule, TAKE 1 CAPSULE BY MOUTH EVERY DAY, Disp: 30 capsule, Rfl: 3  •  famotidine (PEPCID) 40 MG tablet, TAKE 1 TABLET BY MOUTH EVERY DAY, Disp: 90 tablet, Rfl: 3  •  Fluticasone-Salmeterol (Advair) 250-50 mcg/dose inhaler, Inhale 1 puff 2 (two) times a day Rinse mouth after use   (Patient taking differently: Inhale 1 puff if needed Rinse mouth after use ), Disp: 60 blister, Rfl: 3  •  gabapentin (NEURONTIN) 300 mg capsule, TAKE 1 CAPSULE BY MOUTH THREE TIMES A DAY, Disp: 90 capsule, Rfl: 3  •  lisinopril (ZESTRIL) 20 mg tablet, TAKE 1 TABLET BY MOUTH EVERY DAY, Disp: 90 tablet, Rfl: 1  •  meclizine (ANTIVERT) 25 mg tablet, Take 1 tablet (25 mg total) by mouth 3 (three) times a day as needed for dizziness, Disp: 30 tablet, Rfl: 3  •  potassium chloride (MICRO-K) 10 MEQ CR capsule, TAKE 2 CAPSULES (20 MEQ TOTAL) BY MOUTH DAILY, Disp: 180 capsule, Rfl: 3  •  predniSONE 10 mg tablet, 3 tabs po bid x2 days, then 2 tabs po bid x2 days, then 1 tab bid x2 days, then 1 daily until done , Disp: 26 tablet, Rfl: 0  •  spironolactone (ALDACTONE) 25 mg tablet, Take 1 tablet (25 mg total) by mouth daily, Disp: 30 tablet, Rfl: 5  •  Synthroid 112 MCG tablet, TAKE 1 TABLET (112 MCG TOTAL) BY MOUTH DAILY IN THE EARLY MORNING, Disp: 90 tablet, Rfl: 1    Review of Systems  Constitutional:     Denies fever, chills, fatigue, weakness ,weight loss, weight gain      ENT: Denies earache, loss of hearing, nosebleed, nasal discharge,but complains of nasal congestion, sore throat,hoarseness and sinus pain and pressure    Pulmonary: Denies shortness of breath ,cough , dyspnea on exertionon, orthopnea ,+ PND   Cardiovascular:  Denies bradycardia , tachycardia ,palpations, lower extremity, edema leg, claudication  Breast:  Denies new or changing breast lumps,  nipple discharge, nipple changes,  Abdomen:  Denies abdominal pain , anorexia ,indigestion, nausea ,vomiting, constipation , diarrhea  Musculoskeletal: Denies myalgias, arthralgias, joint swelling, joint stiffness ,limb pain, limb swelling  Lymph:+ swollen glands  Gu: no dysuria or urinary frequency  Skin: Denies skin rash, skin lesion, skin wound, itching,dry skin  Neuro: Denies headache, numbness, tingling, confusion, loss of consciousness, dizziness ,vertigo  Psychiatric: Denies feelings of depression, suicidal ideation, anxiety, sleep disturbances    OBJECTIVE  /80   Pulse 79   Temp 97 7 °F (36 5 °C)   Ht 5' (1 524 m)   Wt 75 8 kg (167 lb)   SpO2 96%   BMI 32 61 kg/m²   Constitutional:   NAD, well appearing and well nourished      ENT:   Conjunctiva and lids: no injection, edema, or discharge    Pupils and iris: ELAINE bilaterally   External inspection of ears and nose: normal without deformities or discharge  Otoscopic exam: Canals patent ; tm are dull, with with erythem and effusions  ENasal mucosa, septum and turbinates: Turbinae injection with discharge   Oropharynx:  Moist mucosa, normal tongue and tonsils without lesions  Erythema and injection  of post pharynx with pnd      Pulmonary:Respiratory effort normal rate and rhythm, no increased work of breathing  Auscultation of lungs:  Clear bilaterally with no adventitious breath sounds       Cardiovascular: regular rate and rhythm, S1 and S2, no murmur, no edema and/or varicosities of LE      Abdomen: Soft and non-distended    Positive bowel sounds    No heptomegaly or splenomegaly    Lymphatic: Anterior  cervical lymphadenopathy         Muscskeletal:  Gait and station: Normal gait     Digits and nails normal without clubbing or cyanosis     Inspection/palpation of joints, bones, and muscles:  No joint tenderness, swelling, full active and passive range of motion      Gu: no suprabubic tenderness, CVA tenderness or urethral discharge  Skin: Normal skin turgor and no rashes    Neuro:    Normal reflexes   Psych:   alert and oriented to person, place and time  normal mood and affect      Assessment/Plan:Diagnoses and all orders for this visit:    Acute sinusitis, recurrence not specified, unspecified location  -     predniSONE 10 mg tablet; 3 tabs po bid x2 days, then 2 tabs po bid x2 days, then 1 tab bid x2 days, then 1 daily until done  -     cefuroxime (CEFTIN) 500 mg tablet; Take 1 tablet (500 mg total) by mouth every 12 (twelve) hours for 10 days        Reviewed with patient plan to treat with above plan      Patient instructed to call in 72 hours if not feeling better or if symptoms worsen

## 2023-05-20 DIAGNOSIS — R60.9 EDEMA, UNSPECIFIED TYPE: ICD-10-CM

## 2023-05-20 DIAGNOSIS — E03.9 HYPOTHYROIDISM, UNSPECIFIED TYPE: ICD-10-CM

## 2023-05-20 DIAGNOSIS — I10 ESSENTIAL HYPERTENSION: ICD-10-CM

## 2023-05-20 DIAGNOSIS — K21.9 GASTROESOPHAGEAL REFLUX DISEASE WITHOUT ESOPHAGITIS: ICD-10-CM

## 2023-05-22 RX ORDER — LEVOTHYROXINE SODIUM 112 MCG
TABLET ORAL
Qty: 90 TABLET | Refills: 1 | Status: SHIPPED | OUTPATIENT
Start: 2023-05-22

## 2023-05-22 RX ORDER — FAMOTIDINE 40 MG/1
TABLET, FILM COATED ORAL
Qty: 90 TABLET | Refills: 3 | Status: SHIPPED | OUTPATIENT
Start: 2023-05-22

## 2023-05-22 RX ORDER — LISINOPRIL 20 MG/1
TABLET ORAL
Qty: 90 TABLET | Refills: 1 | Status: SHIPPED | OUTPATIENT
Start: 2023-05-22

## 2023-05-22 RX ORDER — SPIRONOLACTONE 25 MG/1
25 TABLET ORAL DAILY
Qty: 90 TABLET | Refills: 2 | Status: SHIPPED | OUTPATIENT
Start: 2023-05-22

## 2023-05-22 RX ORDER — POTASSIUM CHLORIDE 750 MG/1
CAPSULE, EXTENDED RELEASE ORAL
Qty: 180 CAPSULE | Refills: 3 | Status: SHIPPED | OUTPATIENT
Start: 2023-05-22

## 2023-10-03 ENCOUNTER — TELEPHONE (OUTPATIENT)
Age: 68
End: 2023-10-03

## 2023-10-03 NOTE — TELEPHONE ENCOUNTER
Called patient and explained that Dr. Roya Kuo is not in the office this week.  Offered appointment for next week, but patient stated she would be in [facility] next week taking care of her daughter who is having surgery and she does not know how long she will be there    She said she will call back to schedule an appointment once she is back in the area

## 2023-10-03 NOTE — TELEPHONE ENCOUNTER
Patient called she would like an appointment with dr Michelle Modi sooner than later  --she jerks/flintches in her sleep  She wondered if she could be squeezed in somewhere    ----she does not want to see another provider    ---

## 2023-10-09 ENCOUNTER — ESTABLISHED COMPREHENSIVE EXAM (OUTPATIENT)
Dept: URBAN - METROPOLITAN AREA CLINIC 6 | Facility: CLINIC | Age: 68
End: 2023-10-09

## 2023-10-09 DIAGNOSIS — H18.453: ICD-10-CM

## 2023-10-09 DIAGNOSIS — H04.123: ICD-10-CM

## 2023-10-09 PROCEDURE — 92014 COMPRE OPH EXAM EST PT 1/>: CPT

## 2023-10-09 ASSESSMENT — TONOMETRY
OS_IOP_MMHG: 16
OD_IOP_MMHG: 14

## 2023-10-09 ASSESSMENT — KERATOMETRY
OS_AXISANGLE_DEGREES: 089
OS_AXISANGLE2_DEGREES: 179
OD_K1POWER_DIOPTERS: 42.50
OS_K1POWER_DIOPTERS: 39.50
OS_K2POWER_DIOPTERS: 44.25
OD_AXISANGLE_DEGREES: 037
OD_K2POWER_DIOPTERS: 45.25
OD_AXISANGLE2_DEGREES: 127

## 2023-10-09 ASSESSMENT — VISUAL ACUITY
OD_SC: 20/60
OS_SC: 20/30-2
OD_PH: 20/40

## 2023-11-27 DIAGNOSIS — E03.9 HYPOTHYROIDISM, UNSPECIFIED TYPE: ICD-10-CM

## 2023-11-27 RX ORDER — LEVOTHYROXINE SODIUM 112 MCG
TABLET ORAL
Qty: 90 TABLET | Refills: 1 | Status: SHIPPED | OUTPATIENT
Start: 2023-11-27

## 2023-12-01 DIAGNOSIS — R60.9 EDEMA, UNSPECIFIED TYPE: ICD-10-CM

## 2023-12-01 RX ORDER — SPIRONOLACTONE 25 MG/1
25 TABLET ORAL DAILY
Qty: 90 TABLET | Refills: 1 | Status: SHIPPED | OUTPATIENT
Start: 2023-12-01

## 2023-12-10 DIAGNOSIS — R60.9 EDEMA, UNSPECIFIED TYPE: ICD-10-CM

## 2023-12-11 RX ORDER — SPIRONOLACTONE 25 MG/1
25 TABLET ORAL DAILY
Qty: 90 TABLET | Refills: 1 | Status: SHIPPED | OUTPATIENT
Start: 2023-12-11

## 2024-02-04 DIAGNOSIS — I10 ESSENTIAL HYPERTENSION: ICD-10-CM

## 2024-02-04 RX ORDER — AMLODIPINE BESYLATE 5 MG/1
5 TABLET ORAL DAILY
Qty: 30 TABLET | Refills: 0 | Status: SHIPPED | OUTPATIENT
Start: 2024-02-04

## 2024-02-08 DIAGNOSIS — E03.9 HYPOTHYROIDISM, UNSPECIFIED TYPE: ICD-10-CM

## 2024-02-08 RX ORDER — LEVOTHYROXINE SODIUM 112 MCG
TABLET ORAL
Qty: 90 TABLET | Refills: 1 | Status: SHIPPED | OUTPATIENT
Start: 2024-02-08

## 2024-03-22 DIAGNOSIS — I10 ESSENTIAL HYPERTENSION: ICD-10-CM

## 2024-03-22 RX ORDER — AMLODIPINE BESYLATE 5 MG/1
5 TABLET ORAL DAILY
Qty: 90 TABLET | Refills: 0 | Status: SHIPPED | OUTPATIENT
Start: 2024-03-22

## 2024-04-05 DIAGNOSIS — M54.50 BACK PAIN, LUMBOSACRAL: ICD-10-CM

## 2024-04-05 DIAGNOSIS — K21.9 GASTROESOPHAGEAL REFLUX DISEASE WITHOUT ESOPHAGITIS: ICD-10-CM

## 2024-04-05 DIAGNOSIS — I10 ESSENTIAL HYPERTENSION: ICD-10-CM

## 2024-04-05 DIAGNOSIS — R60.9 EDEMA, UNSPECIFIED TYPE: ICD-10-CM

## 2024-04-05 RX ORDER — GABAPENTIN 300 MG/1
CAPSULE ORAL
Qty: 90 CAPSULE | Refills: 2 | Status: SHIPPED | OUTPATIENT
Start: 2024-04-05

## 2024-04-05 RX ORDER — LISINOPRIL 20 MG/1
TABLET ORAL
Qty: 90 TABLET | Refills: 0 | Status: SHIPPED | OUTPATIENT
Start: 2024-04-05

## 2024-04-05 RX ORDER — POTASSIUM CHLORIDE 750 MG/1
20 CAPSULE, EXTENDED RELEASE ORAL DAILY
Qty: 180 CAPSULE | Refills: 1 | Status: SHIPPED | OUTPATIENT
Start: 2024-04-05

## 2024-04-05 RX ORDER — FAMOTIDINE 40 MG/1
TABLET, FILM COATED ORAL
Qty: 90 TABLET | Refills: 1 | Status: SHIPPED | OUTPATIENT
Start: 2024-04-05

## 2024-07-17 DIAGNOSIS — I10 ESSENTIAL HYPERTENSION: ICD-10-CM

## 2024-07-19 RX ORDER — LISINOPRIL 20 MG/1
TABLET ORAL
Qty: 90 TABLET | Refills: 0 | Status: SHIPPED | OUTPATIENT
Start: 2024-07-19

## 2024-09-29 DIAGNOSIS — K21.9 GASTROESOPHAGEAL REFLUX DISEASE WITHOUT ESOPHAGITIS: ICD-10-CM

## 2024-09-30 RX ORDER — FAMOTIDINE 40 MG/1
TABLET, FILM COATED ORAL
Qty: 90 TABLET | Refills: 1 | Status: SHIPPED | OUTPATIENT
Start: 2024-09-30

## 2025-03-19 ENCOUNTER — VBI (OUTPATIENT)
Dept: ADMINISTRATIVE | Facility: OTHER | Age: 70
End: 2025-03-19

## 2025-03-19 NOTE — TELEPHONE ENCOUNTER
Patient contacted to schedule Annual Wellness Visit .   A message was left for the patient to return the call.

## (undated) DEVICE — INTENDED FOR TISSUE SEPARATION, AND OTHER PROCEDURES THAT REQUIRE A SHARP SURGICAL BLADE TO PUNCTURE OR CUT.: Brand: BARD-PARKER SAFETY BLADES SIZE 11, STERILE

## (undated) DEVICE — HARMONIC ACE 5MM DIAMETER SHEARS 36CM SHAFT LENGTH + ADAPTIVE TISSUE TECHNOLOGY FOR USE WITH GENERATOR G11: Brand: HARMONIC ACE

## (undated) DEVICE — UNDYED BRAIDED (POLYGLACTIN 910), SYNTHETIC ABSORBABLE SUTURE: Brand: COATED VICRYL

## (undated) DEVICE — CLOSURE DEVICE ENDO CLOSE

## (undated) DEVICE — AEM CORD

## (undated) DEVICE — SPONGE 4 X 4 XRAY 16 PLY STRL LF RFD

## (undated) DEVICE — DRAPE TOWEL: Brand: CONVERTORS

## (undated) DEVICE — SUT VICRYL 0 REEL 54 IN J287G

## (undated) DEVICE — 3000CC GUARDIAN II: Brand: GUARDIAN

## (undated) DEVICE — SPONGE LAP 18 X 18 IN STRL RFD

## (undated) DEVICE — GLOVE INDICATOR PI UNDERGLOVE SZ 8 BLUE

## (undated) DEVICE — ENDOPOUCH RETRIEVER SPECIMEN RETRIEVAL BAGS: Brand: ENDOPOUCH RETRIEVER

## (undated) DEVICE — CHLORAPREP HI-LITE 26ML ORANGE

## (undated) DEVICE — GLOVE SRG BIOGEL 7

## (undated) DEVICE — GLOVE SRG BIOGEL ECLIPSE 6

## (undated) DEVICE — GLOVE SRG BIOGEL ECLIPSE 7

## (undated) DEVICE — ENDOPATH XCEL BLADELESS TROCARS WITH STABILITY SLEEVES: Brand: ENDOPATH XCEL

## (undated) DEVICE — NEEDLE 22 G X 1 1/2 SAFETY

## (undated) DEVICE — LIGAMAX 5 MM ENDOSCOPIC MULTIPLE CLIP APPLIER: Brand: LIGAMAX

## (undated) DEVICE — SUT MONOCRYL 4-0 PS-2 18 IN Y496G

## (undated) DEVICE — GLOVE INDICATOR PI UNDERGLOVE SZ 7 BLUE

## (undated) DEVICE — DRAPE EQUIPMENT RF WAND

## (undated) DEVICE — GLOVE SRG BIOGEL ECLIPSE 8

## (undated) DEVICE — ADHESIVE SKN CLSR HISTOACRYL FLEX 0.5ML LF

## (undated) DEVICE — PACK PBDS LAP CHOLE RF

## (undated) DEVICE — TROCAR APPPLE 5MM EXTENDED LENGTH

## (undated) RX ORDER — ERYTHROMYCIN 5 MG/G: 1/4 OINTMENT OPHTHALMIC TWICE A DAY